# Patient Record
Sex: MALE | Race: WHITE | HISPANIC OR LATINO | Employment: FULL TIME | URBAN - METROPOLITAN AREA
[De-identification: names, ages, dates, MRNs, and addresses within clinical notes are randomized per-mention and may not be internally consistent; named-entity substitution may affect disease eponyms.]

---

## 2023-03-27 PROBLEM — J34.2 DEVIATED SEPTUM: Status: ACTIVE | Noted: 2023-03-27

## 2023-03-27 PROBLEM — S39.92XA BACK INJURY: Status: ACTIVE | Noted: 2023-03-27

## 2023-03-27 PROBLEM — D45 POLYCYTHEMIA VERA (MULTI): Status: ACTIVE | Noted: 2023-03-27

## 2023-03-27 PROBLEM — D17.72: Status: ACTIVE | Noted: 2023-03-27

## 2023-03-27 PROBLEM — G47.33 OSA (OBSTRUCTIVE SLEEP APNEA): Status: ACTIVE | Noted: 2023-03-27

## 2023-03-27 PROBLEM — M25.511 BILATERAL SHOULDER PAIN: Status: ACTIVE | Noted: 2023-03-27

## 2023-03-27 PROBLEM — M47.812 CERVICAL FACET SYNDROME: Status: ACTIVE | Noted: 2023-03-27

## 2023-03-27 PROBLEM — J03.90 TONSILLITIS: Status: ACTIVE | Noted: 2023-03-27

## 2023-03-27 PROBLEM — G47.19 EXCESSIVE DAYTIME SLEEPINESS: Status: ACTIVE | Noted: 2023-03-27

## 2023-03-27 PROBLEM — K82.4 GALL BLADDER POLYP: Status: ACTIVE | Noted: 2023-03-27

## 2023-03-27 PROBLEM — J30.89 ALLERGIC RHINITIS DUE TO MOLD: Status: ACTIVE | Noted: 2023-03-27

## 2023-03-27 PROBLEM — M71.9 BURSITIS: Status: ACTIVE | Noted: 2023-03-27

## 2023-03-27 PROBLEM — J30.89 ALLERGIC RHINITIS DUE TO DUST MITE: Status: ACTIVE | Noted: 2023-03-27

## 2023-03-27 PROBLEM — M54.9 BACK PAIN: Status: ACTIVE | Noted: 2023-03-27

## 2023-03-27 PROBLEM — R21 RASH: Status: ACTIVE | Noted: 2023-03-27

## 2023-03-27 PROBLEM — N50.89 SCROTAL MASS: Status: ACTIVE | Noted: 2023-03-27

## 2023-03-27 PROBLEM — N50.9 TESTICULAR LESION: Status: ACTIVE | Noted: 2023-03-27

## 2023-03-27 PROBLEM — M79.18 MYOFASCIAL PAIN SYNDROME: Status: ACTIVE | Noted: 2023-03-27

## 2023-03-27 PROBLEM — G71.19 NEUROMYOTONIA: Status: ACTIVE | Noted: 2023-03-27

## 2023-03-27 PROBLEM — K40.90 LEFT INGUINAL HERNIA: Status: ACTIVE | Noted: 2023-03-27

## 2023-03-27 PROBLEM — M79.603 ARM PAIN: Status: ACTIVE | Noted: 2023-03-27

## 2023-03-27 PROBLEM — J31.0 RHINITIS: Status: ACTIVE | Noted: 2023-03-27

## 2023-03-27 PROBLEM — R42 DIZZINESS: Status: ACTIVE | Noted: 2023-03-27

## 2023-03-27 PROBLEM — M25.522 LEFT ELBOW PAIN: Status: ACTIVE | Noted: 2023-03-27

## 2023-03-27 PROBLEM — R09.82 POST-NASAL DRAINAGE: Status: ACTIVE | Noted: 2023-03-27

## 2023-03-27 PROBLEM — F41.8 ANXIETY WITH DEPRESSION: Status: ACTIVE | Noted: 2023-03-27

## 2023-03-27 PROBLEM — J45.40 MODERATE PERSISTENT ASTHMA WITHOUT COMPLICATION (HHS-HCC): Status: ACTIVE | Noted: 2023-03-27

## 2023-03-27 PROBLEM — F41.9 ANXIETY DISORDER: Status: ACTIVE | Noted: 2023-03-27

## 2023-03-27 PROBLEM — F19.10 POLYSUBSTANCE ABUSE (MULTI): Status: ACTIVE | Noted: 2023-03-27

## 2023-03-27 PROBLEM — M25.512 BILATERAL SHOULDER PAIN: Status: ACTIVE | Noted: 2023-03-27

## 2023-03-27 PROBLEM — M54.2 NECK PAIN: Status: ACTIVE | Noted: 2023-03-27

## 2023-03-27 PROBLEM — R05.9 COUGH: Status: ACTIVE | Noted: 2023-03-27

## 2023-03-27 PROBLEM — J45.909 ASTHMA (HHS-HCC): Status: ACTIVE | Noted: 2023-03-27

## 2023-03-27 PROBLEM — K42.0 RECURRENT UMBILICAL HERNIA WITH INCARCERATION: Status: ACTIVE | Noted: 2023-03-27

## 2023-03-27 PROBLEM — M19.90 ARTHRITIS: Status: ACTIVE | Noted: 2023-03-27

## 2023-03-27 PROBLEM — M17.11 ARTHRITIS OF RIGHT KNEE: Status: ACTIVE | Noted: 2023-03-27

## 2023-03-27 PROBLEM — R25.3 BENIGN FASCICULATION-CRAMP SYNDROME: Status: ACTIVE | Noted: 2023-03-27

## 2023-03-27 PROBLEM — M79.671 PAIN OF RIGHT HEEL: Status: ACTIVE | Noted: 2023-03-27

## 2023-03-27 PROBLEM — M25.561 RIGHT KNEE PAIN: Status: ACTIVE | Noted: 2023-03-27

## 2023-03-27 PROBLEM — R07.81 RIB PAIN ON RIGHT SIDE: Status: ACTIVE | Noted: 2023-03-27

## 2023-03-27 PROBLEM — K58.9 IRRITABLE BOWEL SYNDROME WITHOUT DIARRHEA: Status: ACTIVE | Noted: 2023-03-27

## 2023-03-27 PROBLEM — B35.9 RINGWORM: Status: ACTIVE | Noted: 2023-03-27

## 2023-03-27 PROBLEM — E78.6 HYPOCHOLESTEROLEMIA: Status: ACTIVE | Noted: 2023-03-27

## 2023-03-27 PROBLEM — R19.4 CHANGE IN BOWEL HABIT: Status: ACTIVE | Noted: 2023-03-27

## 2023-03-27 PROBLEM — T78.40XA ALLERGY: Status: ACTIVE | Noted: 2023-03-27

## 2023-03-27 PROBLEM — R10.9 ABDOMINAL PAIN: Status: ACTIVE | Noted: 2023-03-27

## 2023-03-27 RX ORDER — ESCITALOPRAM OXALATE 20 MG/1
2 TABLET ORAL DAILY
COMMUNITY
Start: 2018-03-16 | End: 2023-12-19 | Stop reason: SDUPTHER

## 2023-03-27 RX ORDER — BENZOYL PEROXIDE 100 MG/ML
LIQUID TOPICAL 2 TIMES DAILY
COMMUNITY
Start: 2022-05-12 | End: 2023-05-01 | Stop reason: SDUPTHER

## 2023-03-27 RX ORDER — BUSPIRONE HYDROCHLORIDE 15 MG/1
1 TABLET ORAL 3 TIMES DAILY
COMMUNITY
Start: 2019-05-28 | End: 2023-12-19 | Stop reason: SDUPTHER

## 2023-03-27 RX ORDER — AZELASTINE HCL 205.5 UG/1
SPRAY NASAL
COMMUNITY
Start: 2022-02-23 | End: 2024-05-08 | Stop reason: WASHOUT

## 2023-03-27 RX ORDER — ALBUTEROL SULFATE 90 UG/1
2 AEROSOL, METERED RESPIRATORY (INHALATION) EVERY 4 HOURS PRN
COMMUNITY
Start: 2021-12-20 | End: 2024-05-08 | Stop reason: WASHOUT

## 2023-03-27 RX ORDER — MONTELUKAST SODIUM 10 MG/1
1 TABLET ORAL DAILY
COMMUNITY
Start: 2022-01-20 | End: 2024-05-08 | Stop reason: WASHOUT

## 2023-03-27 RX ORDER — ARIPIPRAZOLE 5 MG/1
1 TABLET ORAL DAILY
COMMUNITY
Start: 2021-11-30 | End: 2023-12-19 | Stop reason: SDUPTHER

## 2023-03-27 RX ORDER — ROSUVASTATIN CALCIUM 10 MG/1
1 TABLET, COATED ORAL NIGHTLY
COMMUNITY
Start: 2022-06-30

## 2023-03-27 RX ORDER — METRONIDAZOLE 10 MG/G
GEL TOPICAL
COMMUNITY
Start: 2022-05-12 | End: 2024-01-12 | Stop reason: SDUPTHER

## 2023-03-27 RX ORDER — CLONAZEPAM 0.5 MG/1
TABLET ORAL
COMMUNITY
Start: 2018-03-16 | End: 2024-05-08 | Stop reason: WASHOUT

## 2023-03-27 RX ORDER — AMMONIUM LACTATE 12 G/100G
LOTION TOPICAL
COMMUNITY
Start: 2021-06-24

## 2023-03-27 RX ORDER — LORATADINE 10 MG/1
1 TABLET ORAL DAILY
COMMUNITY
Start: 2021-08-13 | End: 2023-05-01 | Stop reason: SDUPTHER

## 2023-04-02 PROBLEM — M25.569 KNEE PAIN: Status: ACTIVE | Noted: 2023-04-02

## 2023-04-02 PROBLEM — F14.20 COCAINE USE DISORDER, MODERATE, DEPENDENCE (MULTI): Status: ACTIVE | Noted: 2023-04-02

## 2023-04-02 PROBLEM — F10.21 ALCOHOL DEPENDENCE IN REMISSION (MULTI): Status: ACTIVE | Noted: 2023-04-02

## 2023-04-02 PROBLEM — F12.10 CANNABIS USE DISORDER, MILD, ABUSE: Status: ACTIVE | Noted: 2023-04-02

## 2023-04-02 PROBLEM — M17.31 POST-TRAUMATIC OSTEOARTHRITIS OF RIGHT KNEE: Status: ACTIVE | Noted: 2023-04-02

## 2023-04-02 PROBLEM — F15.20: Status: ACTIVE | Noted: 2023-04-02

## 2023-04-03 ENCOUNTER — OFFICE VISIT (OUTPATIENT)
Dept: PRIMARY CARE | Facility: CLINIC | Age: 48
End: 2023-04-03
Payer: COMMERCIAL

## 2023-04-03 VITALS
BODY MASS INDEX: 33.47 KG/M2 | SYSTOLIC BLOOD PRESSURE: 126 MMHG | DIASTOLIC BLOOD PRESSURE: 72 MMHG | HEIGHT: 69 IN | WEIGHT: 226 LBS

## 2023-04-03 DIAGNOSIS — M17.11 ARTHRITIS OF RIGHT KNEE: Primary | ICD-10-CM

## 2023-04-03 PROCEDURE — 99214 OFFICE O/P EST MOD 30 MIN: CPT | Performed by: INTERNAL MEDICINE

## 2023-04-03 NOTE — PROGRESS NOTES
OFFICE NOTE    NAME OF THE PATIENT: Cecil Ruiz    YOB: 1975    CHIEF COMPLAINT:  Mr. Ruiz today came here for multiple medical issues.  Right knee severe pain.  He is going for presurgical clinic.  He wants presurgical clearance.  Though he has appointment with surgical clinic, they are going to do his blood work.  He showed me he had a stress test done six years ago with Dr. Jain.  It was okay.  He told me swims one-hour-a-day.  No problem.  Otherwise okay.    PAST MEDICAL HISTORY:  Reviewed on EMR, unchanged.  Anxiety, depression, arthritis.    CURRENT MEDICATIONS:  Reviewed on EMR, unchanged.  He is on Ability, citalopram, buspirone.    ALLERGIES:  Reviewed on EMR, unchanged.  Penicillin.    SOCIAL HISTORY:  Reviewed on EMR, unchanged.  He does not smoke, does not drink alcohol.  Occupation, he works for [employer].  He is single, one child.  Child healthy.    FAMILY HISTORY:  Reviewed on EMR, unchanged.  Mother passed away, had pancreatic cancer.  Father, atrial fibrillation.    IMMUNIZATION:  Tetanus within the last 10 years.    FUNCTIONAL CAPACITY:  He had a normal stress test six years ago and he can swim one hour.  He is going to presurgical clinic.  They are going to do EKG and blood work is pending.    REVIEW OF SYSTEMS:  The patient personally never had a heart attack.  No stroke.  No diabetes.  No cancer.  All 12 systems reviewed and pertaining covered in history and physical.    PHYSICAL EXAMINATION  VITAL SIGNS:  As recorded and reviewed from EMR.  EYES:  The patient's sclerae white.  The pupils were equal and round.  ENT:  The patient's external ears were normal and the otoscopic examination was negative.  NECK:  Supple.  There were no palpable masses.  Thyroid was not enlarged and there were no carotid bruits.  RESPIRATORY:  The patient had normal inspirations and expirations.  The breath sounds were equal bilaterally and clear to auscultation.  CARDIOVASCULAR:  The patient  "had S1 normal, split S2 without obvious rubs, clicks, or murmurs.  GASTROINTESTINAL:  There was no hepatosplenomegaly.  There were no palpable masses and no inguinal nodes.  LYMPHATIC:  The patient had no axillary, groin, or lymphadenopathy.  MUSCULOSKELETAL:  The patient had normal gait.  The joints appeared to be normal without evidence of deformity.  Grossly the muscles had good range of motion and were without effusion.  EXTREMITIES:  Knee swelling and tenderness.  Movements painful.  NEUROLOGIC:  The patient had normal cranial nerves.  The reflexes, sensory, and motor examination were grossly within normal limits.  PSYCHIATRIC:  The patient had normal judgment and insight.  The patient was oriented to person, place, and time, and had no obvious mood defect including depression, anxiety, and/or agitation.    LAB WORK:  Laboratory testing discussed.    ASSESSMENT AND PLAN:  The patient is going for surgery.  My recommendations:  Check the blood work and EKG.  It should be okay.  Cardiac wise looks like the patient is at average risk.  Anxiety and depression.  Continue medication.  DVT fall precaution routine, incentive spirometry, wound care.  All in all, the patient is at low risk for this knee surgery, but please make sure EKG and blood work okay.  Continue to follow.    Kindly review this note in conjunction with EMR.   Subjective   Patient ID: Cecil Ruiz is a 48 y.o. male who presents for Follow-up (Presurgical clearance ).      HPI    Review of Systems    Objective   /72   Ht 1.753 m (5' 9\")   Wt 103 kg (226 lb)   BMI 33.37 kg/m²       Physical Exam    Assessment/Plan   Problem List Items Addressed This Visit    None        "

## 2023-04-10 LAB
ALANINE AMINOTRANSFERASE (SGPT) (U/L) IN SER/PLAS: 26 U/L (ref 10–52)
ALBUMIN (G/DL) IN SER/PLAS: 4.3 G/DL (ref 3.4–5)
ALKALINE PHOSPHATASE (U/L) IN SER/PLAS: 50 U/L (ref 33–120)
AMPHETAMINE (PRESENCE) IN URINE BY SCREEN METHOD: NORMAL
ANION GAP IN SER/PLAS: 14 MMOL/L (ref 10–20)
ASPARTATE AMINOTRANSFERASE (SGOT) (U/L) IN SER/PLAS: 16 U/L (ref 9–39)
BARBITURATES PRESENCE IN URINE BY SCREEN METHOD: NORMAL
BASOPHILS (10*3/UL) IN BLOOD BY AUTOMATED COUNT: 0.06 X10E9/L (ref 0–0.1)
BASOPHILS/100 LEUKOCYTES IN BLOOD BY AUTOMATED COUNT: 0.8 % (ref 0–2)
BENZODIAZEPINE (PRESENCE) IN URINE BY SCREEN METHOD: NORMAL
BILIRUBIN TOTAL (MG/DL) IN SER/PLAS: 0.5 MG/DL (ref 0–1.2)
C REACTIVE PROTEIN (MG/L) IN SER/PLAS: 0.1 MG/DL
CALCIUM (MG/DL) IN SER/PLAS: 9.5 MG/DL (ref 8.6–10.3)
CANNABINOIDS IN URINE BY SCREEN METHOD: NORMAL
CARBON DIOXIDE, TOTAL (MMOL/L) IN SER/PLAS: 28 MMOL/L (ref 21–32)
CHLORIDE (MMOL/L) IN SER/PLAS: 104 MMOL/L (ref 98–107)
COCAINE (PRESENCE) IN URINE BY SCREEN METHOD: NORMAL
CREATININE (MG/DL) IN SER/PLAS: 0.96 MG/DL (ref 0.5–1.3)
DRUG SCREEN COMMENT URINE: NORMAL
EOSINOPHILS (10*3/UL) IN BLOOD BY AUTOMATED COUNT: 0.18 X10E9/L (ref 0–0.7)
EOSINOPHILS/100 LEUKOCYTES IN BLOOD BY AUTOMATED COUNT: 2.5 % (ref 0–6)
ERYTHROCYTE DISTRIBUTION WIDTH (RATIO) BY AUTOMATED COUNT: 12.8 % (ref 11.5–14.5)
ERYTHROCYTE MEAN CORPUSCULAR HEMOGLOBIN CONCENTRATION (G/DL) BY AUTOMATED: 32.6 G/DL (ref 32–36)
ERYTHROCYTE MEAN CORPUSCULAR VOLUME (FL) BY AUTOMATED COUNT: 92 FL (ref 80–100)
ERYTHROCYTES (10*6/UL) IN BLOOD BY AUTOMATED COUNT: 5.51 X10E12/L (ref 4.5–5.9)
FENTANYL URINE: NORMAL
GFR MALE: >90 ML/MIN/1.73M2
GLUCOSE (MG/DL) IN SER/PLAS: 85 MG/DL (ref 74–99)
HEMATOCRIT (%) IN BLOOD BY AUTOMATED COUNT: 50.9 % (ref 41–52)
HEMOGLOBIN (G/DL) IN BLOOD: 16.6 G/DL (ref 13.5–17.5)
IMMATURE GRANULOCYTES/100 LEUKOCYTES IN BLOOD BY AUTOMATED COUNT: 0.3 % (ref 0–0.9)
INR IN PPP BY COAGULATION ASSAY: 1.1 (ref 0.9–1.1)
LEUKOCYTES (10*3/UL) IN BLOOD BY AUTOMATED COUNT: 7.1 X10E9/L (ref 4.4–11.3)
LYMPHOCYTES (10*3/UL) IN BLOOD BY AUTOMATED COUNT: 2.14 X10E9/L (ref 1.2–4.8)
LYMPHOCYTES/100 LEUKOCYTES IN BLOOD BY AUTOMATED COUNT: 30.1 % (ref 13–44)
METHADONE (PRESENCE) IN URINE BY SCREEN METHOD: NORMAL
MONOCYTES (10*3/UL) IN BLOOD BY AUTOMATED COUNT: 0.59 X10E9/L (ref 0.1–1)
MONOCYTES/100 LEUKOCYTES IN BLOOD BY AUTOMATED COUNT: 8.3 % (ref 2–10)
NEUTROPHILS (10*3/UL) IN BLOOD BY AUTOMATED COUNT: 4.11 X10E9/L (ref 1.2–7.7)
NEUTROPHILS/100 LEUKOCYTES IN BLOOD BY AUTOMATED COUNT: 58 % (ref 40–80)
OPIATES (PRESENCE) IN URINE BY SCREEN METHOD: NORMAL
OXYCODONE (PRESENCE) IN URINE BY SCREEN METHOD: NORMAL
PHENCYCLIDINE (PRESENCE) IN URINE BY SCREEN METHOD: NORMAL
PLATELETS (10*3/UL) IN BLOOD AUTOMATED COUNT: 304 X10E9/L (ref 150–450)
POTASSIUM (MMOL/L) IN SER/PLAS: 4.5 MMOL/L (ref 3.5–5.3)
PROTEIN TOTAL: 6.9 G/DL (ref 6.4–8.2)
PROTHROMBIN TIME (PT) IN PPP BY COAGULATION ASSAY: 12.3 SEC (ref 9.8–13.4)
SEDIMENTATION RATE, ERYTHROCYTE: 9 MM/H (ref 0–15)
SODIUM (MMOL/L) IN SER/PLAS: 141 MMOL/L (ref 136–145)
UREA NITROGEN (MG/DL) IN SER/PLAS: 15 MG/DL (ref 6–23)

## 2023-05-01 DIAGNOSIS — R21 RASH: ICD-10-CM

## 2023-05-01 RX ORDER — BENZOYL PEROXIDE 100 MG/ML
LIQUID TOPICAL DAILY
Qty: 148 G | Refills: 3 | Status: SHIPPED | OUTPATIENT
Start: 2023-05-01 | End: 2023-12-21 | Stop reason: WASHOUT

## 2023-05-01 RX ORDER — LORATADINE 10 MG/1
10 TABLET ORAL DAILY
Qty: 90 TABLET | Refills: 0 | Status: SHIPPED | OUTPATIENT
Start: 2023-05-01 | End: 2024-05-08 | Stop reason: WASHOUT

## 2023-06-19 ENCOUNTER — OFFICE VISIT (OUTPATIENT)
Dept: PRIMARY CARE | Facility: CLINIC | Age: 48
End: 2023-06-19
Payer: COMMERCIAL

## 2023-06-19 VITALS
SYSTOLIC BLOOD PRESSURE: 126 MMHG | BODY MASS INDEX: 33.77 KG/M2 | DIASTOLIC BLOOD PRESSURE: 70 MMHG | HEIGHT: 69 IN | WEIGHT: 228 LBS

## 2023-06-19 DIAGNOSIS — I10 HYPERTENSION, UNSPECIFIED TYPE: Primary | ICD-10-CM

## 2023-06-19 PROCEDURE — 3074F SYST BP LT 130 MM HG: CPT | Performed by: INTERNAL MEDICINE

## 2023-06-19 PROCEDURE — 99213 OFFICE O/P EST LOW 20 MIN: CPT | Performed by: INTERNAL MEDICINE

## 2023-06-19 PROCEDURE — 3078F DIAST BP <80 MM HG: CPT | Performed by: INTERNAL MEDICINE

## 2023-06-19 NOTE — LETTER
June 19, 2023     Patient: Cecil Ruiz   YOB: 1975   Date of Visit: 6/19/2023       To Whom It May Concern:    Cecil Ruiz was seen in my clinic on 6/19/2023 at 9:45 am. Please excuse Cecil for his absence from work on this day to make the appointment. Please allow for 90 days starting July 6 2023 through October 6 2023 to please use the elevator due to a knee issue. Please call with any questions.     If you have any questions or concerns, please don't hesitate to call.         Sincerely,         Aldo Forrester MD        CC: No Recipients

## 2023-06-22 NOTE — PROGRESS NOTES
OFFICE NOTE    NAME OF THE PATIENT: Cecil Ruiz    YOB: 1975    CHIEF COMPLAINT:  This gentleman today came here for excruciating knee pain.  His surgery got postponed.  He is going to see Dr. Guardado.  He came for follow-up on various conditions.  He is seeing Pain Management for the knee.  He came for follow-up on various conditions.    PAST MEDICAL HISTORY:  Reviewed on EMR, unchanged.    CURRENT MEDICATIONS:  Reviewed on EMR, unchanged.  List reviewed.    ALLERGIES:  Reviewed on EMR, unchanged.    SOCIAL HISTORY:  Reviewed on EMR, unchanged.  He does not smoke.  No alcohol.  He uses cocaine.    FAMILY HISTORY:  Reviewed on EMR, unchanged.    REVIEW OF SYSTEMS:  All 12 systems reviewed and pertaining covered in history and physical.    PHYSICAL EXAMINATION  VITAL SIGNS:  As recorded and reviewed from EMR.  RESPIRATORY:  The patient had normal inspirations and expirations.  The breath sounds were equal bilaterally and clear to auscultation.  CARDIOVASCULAR:  The patient had S1 normal, split S2 without obvious rubs, clicks, or murmurs.    GASTROINTESTINAL:  There was no hepatosplenomegaly.  There were no palpable masses and no inguinal nodes.  EXTREMITIES:  Legs had no edema.  Knee swelling and tenderness.  NEUROLOGIC:  The patient had normal cranial nerves.  The reflexes, sensory, and motor examination were grossly within normal limits.    LAB WORK:  Laboratory testing discussed.    ASSESSMENT AND PLAN:  Knee pain, arthritis.  Going for physical therapy and Pain Management.  He might need surgery.  He still has cocaine abuse.  Work accommodation.  He wants to use the elevator.  I think he can use.  He might need surgery.   Probably several weeks after surgery he needs accommodation.  I gave him 90-day accommodation.    Kindly review this note in conjunction with EMR.   Subjective   Patient ID: Cecil Ruiz is a 48 y.o. male who presents for Follow-up.      HPI    Review of  "Systems    Objective   /70   Ht 1.753 m (5' 9\")   Wt 103 kg (228 lb)   BMI 33.67 kg/m²       Physical Exam    Assessment/Plan   Problem List Items Addressed This Visit    None        "

## 2023-07-03 ENCOUNTER — OFFICE VISIT (OUTPATIENT)
Dept: PRIMARY CARE | Facility: CLINIC | Age: 48
End: 2023-07-03
Payer: COMMERCIAL

## 2023-07-03 VITALS
HEIGHT: 70 IN | DIASTOLIC BLOOD PRESSURE: 74 MMHG | SYSTOLIC BLOOD PRESSURE: 132 MMHG | WEIGHT: 229 LBS | BODY MASS INDEX: 32.78 KG/M2

## 2023-07-03 DIAGNOSIS — M17.31 POST-TRAUMATIC OSTEOARTHRITIS OF RIGHT KNEE: ICD-10-CM

## 2023-07-03 PROCEDURE — 99213 OFFICE O/P EST LOW 20 MIN: CPT | Performed by: INTERNAL MEDICINE

## 2023-07-03 NOTE — PROGRESS NOTES
OFFICE NOTE    NAME OF THE PATIENT: Cecil Ruiz    YOB: 1975    CHIEF COMPLAINT:  This gentleman today came here for multiple medical issues.  Right knee pain is better.  He could not see Dr. Floyd because of it is a cocaine abuse problem.  He is going to see Dr. Guardado for the knee pain.  Post-traumatic anxiety and depression.  He sees psychiatrist.  She is dealing with his psychiatric issue.  He came here for follow-up.    PAST MEDICAL HISTORY:  Reviewed on EMR, unchanged.    CURRENT MEDICATIONS:  Reviewed on EMR, unchanged.  Abilify, Lexapro.    ALLERGIES:  Reviewed on EMR, unchanged.    SOCIAL HISTORY:  Reviewed on EMR, unchanged.  He does not smoke and does not drink alcohol.    FAMILY HISTORY:  Reviewed on EMR, unchanged.    REVIEW OF SYSTEMS:  All 12 systems reviewed and pertaining covered in history and physical.    PHYSICAL EXAMINATION  VITAL SIGNS:  As recorded and reviewed from EMR.  RESPIRATORY:  The patient had normal inspirations and expirations.  The breath sounds were equal bilaterally and clear to auscultation.  CARDIOVASCULAR:  The patient had S1 normal, split S2 without obvious rubs, clicks, or murmurs.    GASTROINTESTINAL:  There was no hepatosplenomegaly.  There were no palpable masses and no inguinal nodes.  EXTREMITIES:  Legs had no edema.  Knee exam, movements okay.  He could bend both the knees and get up on his own.  Right knee minimal swelling and pain.  NEUROLOGIC:  The patient had normal cranial nerves.  The reflexes, sensory, and motor examination were grossly within normal limits.    LAB WORK:  X-ray done in the past reviewed.    ASSESSMENT AND PLAN:  Right knee pain, chronic problem.  He had a surgery there.  He is going to see Dr. Guardado.  Anxiety, depression, cocaine abuse.  He sees psychiatrist.  I have reviewed psychiatric note from June.  Question of back to work.  I do not have his work description, but he should be able to work normally without  "overdoing his knee, but final state of work will come after evaluation, after seeing job description as well as from Orthopedic opinion.  Back to work as tolerated.  He can use elevator at work.      Kindly review this note in conjunction with EMR.     Subjective   Patient ID: Cecil Ruiz is a 48 y.o. male who presents for Follow-up.      HPI    Review of Systems    Objective   /74   Ht 1.765 m (5' 9.5\")   Wt 104 kg (229 lb)   BMI 33.33 kg/m²       Physical Exam    Assessment/Plan   Problem List Items Addressed This Visit    None        "

## 2023-07-03 NOTE — LETTER
July 3, 2023     Patient: Cecil Ruiz   YOB: 1975   Date of Visit: 7/3/2023       To Whom It May Concern:    Cecil Ruiz was seen in my clinic on 7/3/2023 at 4:15 pm. Please allow Cecil to return to work as tolerated.  Right knee post arthritis is under orthopaedics care. Please see Psychiatrics documentation.      I do not have Cecil's job description, as his job description he can return as tolerated, please allow Cecil to use elevator.     If you have any questions or concerns, please don't hesitate to call.         Sincerely,         Aldo Forrester MD

## 2023-07-03 NOTE — LETTER
July 6, 2023     Patient: Cecil Ruiz   YOB: 1975   Date of Visit: 7/3/2023       To Whom It May Concern:     Cecil Ruiz was seen in my clinic on 7/3/2023 at 4:15 pm. Please allow Cecil to return to work as tolerated.  Right knee post-traumatic osteoarthritis is under orthopaedics care. Please see Psychiatrics documentation. I do not have Cecil's job description, as his job description he can return as tolerated, please allow Cecil to use elevator.     If you have any questions or concerns, please don't hesitate to call [phone].    Sincerely,         Aldo Forrester MD

## 2023-08-15 ENCOUNTER — HOSPITAL ENCOUNTER (OUTPATIENT)
Dept: DATA CONVERSION | Facility: HOSPITAL | Age: 48
End: 2023-08-15
Attending: ANESTHESIOLOGY | Admitting: ANESTHESIOLOGY
Payer: COMMERCIAL

## 2023-08-15 DIAGNOSIS — M25.561 PAIN IN RIGHT KNEE: ICD-10-CM

## 2023-08-29 ENCOUNTER — HOSPITAL ENCOUNTER (OUTPATIENT)
Dept: DATA CONVERSION | Facility: HOSPITAL | Age: 48
End: 2023-08-29
Attending: ANESTHESIOLOGY
Payer: COMMERCIAL

## 2023-08-29 DIAGNOSIS — Z87.81 PERSONAL HISTORY OF (HEALED) TRAUMATIC FRACTURE: ICD-10-CM

## 2023-08-29 DIAGNOSIS — M17.11 UNILATERAL PRIMARY OSTEOARTHRITIS, RIGHT KNEE: ICD-10-CM

## 2023-08-29 DIAGNOSIS — M25.561 PAIN IN RIGHT KNEE: ICD-10-CM

## 2023-09-14 ENCOUNTER — TELEPHONE (OUTPATIENT)
Dept: PRIMARY CARE | Facility: CLINIC | Age: 48
End: 2023-09-14
Payer: COMMERCIAL

## 2023-09-15 ENCOUNTER — HOSPITAL ENCOUNTER (OUTPATIENT)
Dept: DATA CONVERSION | Facility: HOSPITAL | Age: 48
End: 2023-09-15
Attending: ANESTHESIOLOGY
Payer: COMMERCIAL

## 2023-09-15 DIAGNOSIS — M25.561 PAIN IN RIGHT KNEE: ICD-10-CM

## 2023-10-01 NOTE — OP NOTE
Post Operative Note:     PreOp Diagnosis: Right knee degeneration and pain   Post-Procedure Diagnosis: Right knee degeneration  and pain   Procedure: 1.  Radiofrequency ablation of the genicular  nerves on the right side  2.  Moderate Sedation   Surgeon: Edwin Slade MD, PhD   Resident/Fellow/Other Assistant: Praveen Fuentes MD   Estimated Blood Loss (mL): none   Specimen: no   Findings: See Operative Note     Operative Report Dictated:  Dictation: not applicable - note contains Operative  Report   Operative Report:    Mr. Ruiz is a 48-year-old gentleman with right knee degeneration status post traumatic injury with comminuted fracture of the right tibia at 16 years ago presents  for radiofrequency neurotomy of the genicular nerves having had successful diagnostic injections.  Following informed consent, the patient was brought to the operating room and placed in supine position with a roll underneath the right knee.  The right knee area was prepped and draped in the usual sterile fashion.  The skin and subcutaneous tissue  were anesthetized with a total of 8 cc of 0.5% lidocaine superficially.  Multiple needles were used to target the nerve to the vastus lateralis, medial and lateral branches of the nerves to the vastus intermedius, the nerve to the vastus medialis, the  superior lateral and inferior genicular nerves, as well as the recurrent fibular nerve, inferior lateral genicular nerve and infrapatellar branch of the saphenous nerve.  Stimulation was carried out using both sensory and motor stimulation.  2% lidocaine  was applied at each needle tip prior to radiofrequency neurotomy which was carried out at 85 degrees for 90 seconds x 2.  This was done and repeated cycles for needles at the time.  The patient tolerated the procedure fairly well with intravenous midazolam  and fentanyl for mild sedation.  He was transferred to the recovery Misys conclusion of the procedure if they taken out all the  needles.  He will update us on his response as an outpatient.    Attestation:   Note Completion:  Attending Attestation I was present for the entire procedure         Electronic Signatures:  Edwin Slade)  (Signed 15-Sep-2023 09:51)   Authored: Post Operative Note, Note Completion      Last Updated: 15-Sep-2023 09:51 by Edwin Slade)

## 2023-10-01 NOTE — OP NOTE
Post Operative Note:     PreOp Diagnosis: Postsurgical right knee degeneration  and pain   Post-Procedure Diagnosis: Postsurgical right knee  degeneration and pain   Procedure: 1.  Diagnostic right genicular nerve block  2.  Fluoroscopic guidance  3.  Moderate sedation   Surgeon: Edwin Slade MD, PhD   Resident/Fellow/Other Assistant: Yusuf Bob MD   Estimated Blood Loss (mL): none   Specimen: no   Findings: See Operative Note     Operative Report Dictated:  Dictation: not applicable - note contains Operative  Report   Operative Report:    Mr. Ruiz is a 48-year-old gentleman with history of traumatic fractures of the right knee status post surgical repair with subsequent chronic pain due to knee degeneration  presents today for his second diagnostic genicular block.  He has had a previous similar procedure 2 weeks ago with excellent relief.  If this procedure is helpful, he will be scheduled for radiofrequency neurotomy.    Following informed consent, the patient was brought to the operating room and placed in the supine position with a roll under his right knee.  The right knee area was prepped with chlorhexidine and sterile drapes were placed around the preoperative area.   Using fluoroscopic guidance 25-gauge spinal needles were advanced toward the superior medial, superior lateral and inferior medial genicular nerves.  Additionally, a needle was placed in close proximity to the infrapatellar branch of the saphenous nerve.   Injection small amount of contrast revealed appropriate spread.  Thereafter 0.5 cc of 2% lidocaine was injected into each needle tip.  The needle to the superior medial genicular nerves was then withdrawn to the mid//upper third segment of the femoral  shaft on the lateral view therefore targeting the left of the vastus lateralis and 0.5 cc of 2% lidocaine was injected there.  The needles were then advanced toward the dorsal aspect of the femoral bone of the midline superior to  the original needles  thereby targeting the nerves to the vastus lateralis and medial branch to the vastus intermedius.  The needle targeted the inferior lateral genicular nerve branch.  0.5 cc of 2% lidocaine was applied there.  The needles were then removed.  The patient  was transferred to recovery room where he reported greater than 50%..  As such she will be scheduled for radiofrequency neurotomy of the right genicular nerves.    Attestation:   Note Completion:  Attending Attestation I was present for the entire procedure         Electronic Signatures:  Edwin Slade)  (Signed 29-Aug-2023 08:12)   Entered: Post Operative Note   Authored: Post Operative Note, Note Completion      Last Updated: 29-Aug-2023 08:12 by Edwin Slade)

## 2023-10-01 NOTE — OP NOTE
Post Operative Note:     PreOp Diagnosis: Right knee degeneration and pain   Post-Procedure Diagnosis: Right knee degeneration  and pain   Procedure: 1.  Diagnostic right knee genicular nerve  block using fluoroscopic guidance   Surgeon: Edwin Slade MD, PhD   Resident/Fellow/Other Assistant: Yusuf Bob MD   Estimated Blood Loss (mL): none   Specimen: no   Findings: See Operative Note     Operative Report Dictated:  Dictation: not applicable - note contains Operative  Report   Operative Report:    Mr. Ruiz is a 48-year-old gentleman with history of right knee fracture status postsurgical repair with persistent knee pain presenting for diagnostic right knee  genicular nerve block.  X-ray revealed moderate degenerative joint disease of the right knee.    The patient was met in the preoperative holding area and risks, benefits, and alternatives of the procedure were discussed with the patient. The patient provided informed consent to move forward with the procedure.  An IV was placed, and the patient was  brought to the procedure room and positioned supine on the fluoroscopy table.  Regular monitors, including heart rate, blood pressure, and pulse oximetry were applied and monitored throughout the procedure.  The right knee area was exposed and prepped  and draped in the usual sterile fashion using ChloraPrep and sterile towels. Using fluoroscopic guidance, the expected anatomic positions of the right-sided superior medial, superior lateral, and inferomedial genicular nerves were identified as well as  inferior patellar branch of the saphenous and medial branch nerve to the vastus intermedius.  The needle to the superior medial genicular nerve also targeted the nerve the vastus medialis.  We also targeted the nerve to the vastus lateralis.  Each of  the nerves had a 25-gauge spinal needle inserted in close proximity under fluoroscopic guidance to the above-mentioned target sites.  Final needle tip  positions were confirmed in the AP and lateral views.  Injection of Omnipaque contrast after negative  aspiration showed no vascular uptake.  Next, again after negative aspiration, 0.5 mL of 0.5% ropivacaine was injected at each of the needle tips.  The needles were removed, and bandages were applied.      The patient tolerated the procedure well with no immediate complications and was brought to the recovery room in stable condition.  The patient has the pain typically with knee flexion and with squatting.  He will try these maneuvers at home and call  our office and update us on his response.    Attestation:   Note Completion:  Attending Attestation I was present for the entire procedure         Electronic Signatures:  Edwin Slade)  (Signed 15-Aug-2023 12:08)   Authored: Post Operative Note, Note Completion      Last Updated: 15-Aug-2023 12:08 by Edwin Slade)

## 2023-10-23 ENCOUNTER — OFFICE VISIT (OUTPATIENT)
Dept: PAIN MEDICINE | Facility: HOSPITAL | Age: 48
End: 2023-10-23
Payer: COMMERCIAL

## 2023-10-23 DIAGNOSIS — M25.561 CHRONIC PAIN OF RIGHT KNEE: Primary | ICD-10-CM

## 2023-10-23 DIAGNOSIS — G89.29 CHRONIC PAIN OF RIGHT KNEE: Primary | ICD-10-CM

## 2023-10-23 PROCEDURE — 3008F BODY MASS INDEX DOCD: CPT | Performed by: ANESTHESIOLOGY

## 2023-10-23 PROCEDURE — 99214 OFFICE O/P EST MOD 30 MIN: CPT | Performed by: ANESTHESIOLOGY

## 2023-10-23 RX ORDER — TOPIRAMATE 25 MG/1
25 TABLET ORAL CONTINUOUS
Qty: 115 TABLET | Refills: 0 | Status: SHIPPED | OUTPATIENT
Start: 2023-10-23 | End: 2023-11-22 | Stop reason: WASHOUT

## 2023-10-23 ASSESSMENT — PAIN SCALES - GENERAL: PAINLEVEL: 5

## 2023-10-23 NOTE — PROGRESS NOTES
Subjective   Patient ID: Cecil Ruiz is a 48 y.o. male follow-up regarding his chronic right knee pain.  The patient has had a traumatic knee injury many years ago and underwent surgical repair with multiple screws to fix his tibial fracture.  He has had chronic pain that has worsened more recently.  He has not been able to work since 2021.  Antibiotics were.  He states that he is due to return to work by November 1 unless he has disability paperwork filled out.  He underwent recently genicular nerve radiofrequency neurotomy on September 15 which unfortunately did not relieve his pain.  According to the patient he has had constant pain since then in the right knee region.  The patient denies any numbness or tingling in the lower extremity or weakness.    Review of Systems   Constitutional:  Negative for unexpected weight change.   HENT:  Negative for sinus pain.    Eyes:  Negative for visual disturbance.   Respiratory:  Negative for shortness of breath.    Cardiovascular:  Negative for chest pain.   Endocrine: Negative for polyphagia.   Genitourinary:  Negative for genital sores.   Hematological:  Negative for adenopathy.   Psychiatric/Behavioral:  Negative for hallucinations and suicidal ideas.       Physical Exam  Constitutional:       Appearance: Normal appearance.   HENT:      Head: Normocephalic and atraumatic.   Eyes:      Extraocular Movements: Extraocular movements intact.   Cardiovascular:      Rate and Rhythm: Normal rate.   Pulmonary:      Effort: Pulmonary effort is normal.   Abdominal:      General: Abdomen is flat.      Palpations: Abdomen is soft.   Musculoskeletal:         General: Tenderness present over the right knee to deep palpation.  Mild decrease sensation on the medial aspect of both knees.  No allodynia.  No swelling.  No discoloration  Neurological:      Mental Status: The patient is alert.   Psychiatric:         Mood and Affect: Mood normal.      Assessment/Plan   Diagnoses and all  orders for this visit:  Chronic pain of right knee  -     Referral to Occupational Therapy; Future  -     topiramate (Topamax) 25 mg tablet; Take 1 tablet (25 mg) by mouth continuously. Take 1 tab qHSx 3 days --> 1 BID x3 days--> 1 qAM 2 qHS x 3 days--> 2 BID x 3 days-->2 qAM, 3 qHS x3 days--> 3 BIDx 3 days --> 3 qAM, 4 at bedtime x3days -->4 BID and call the office for the 100 mg strength    Mr. Ruiz is a 48-year-old gentleman with chronic right knee pain presenting for persistent pain status post recent genicular radiofrequency neurotomy.  The patient states that he has no pain that is constant.  He may have had neuritic pain following the radiofrequency ablation.  The patient may benefit from being initiated on topiramate 25 mg at night and titrated up by 25 mg every third day until reaching 100 mg twice daily.  The patient will be maintained on that dosage if tolerated well. Goals of therapy, the dosages and side effects of the medication were discussed in detail with the patient.  The patient understands and agrees to take the medication as prescribed.      For his work status, the patient will be referred for functional evaluation.    The patient was invited to contact us back with any questions or concerns.

## 2023-11-01 ENCOUNTER — OFFICE VISIT (OUTPATIENT)
Dept: PAIN MEDICINE | Facility: HOSPITAL | Age: 48
End: 2023-11-01
Payer: COMMERCIAL

## 2023-11-01 DIAGNOSIS — M17.11 ARTHRITIS OF RIGHT KNEE: Primary | ICD-10-CM

## 2023-11-01 PROCEDURE — 3008F BODY MASS INDEX DOCD: CPT | Performed by: ANESTHESIOLOGY

## 2023-11-01 PROCEDURE — 99212 OFFICE O/P EST SF 10 MIN: CPT | Performed by: ANESTHESIOLOGY

## 2023-11-01 ASSESSMENT — PAIN SCALES - GENERAL: PAINLEVEL: 3

## 2023-11-01 NOTE — PROGRESS NOTES
Subjective   Patient ID: Cecil Ruiz is a 48 y.o. male.    Knee Pain       Cecil Ruiz is a 48-year-old gentleman with chronic right knee pain status post genicular radiofrequency neurotomy who is presenting as follow-up in our clinic today. Patient presents requesting FMLA documentation as well as a prescription for a cane that he requires for ambulation. He says his right knee is being well managed with his Topamax medication regiment and has no complaints at this time.    Review of Systems   All other systems reviewed and are negative.      Objective   Physical Exam  PHYSICAL EXAM  Vitals signs reviewed  Constitutional:       General: Not in acute distress     Appearance: Normal appearance. Not ill-appearing.  HENT:     Head: Normocephalic and atraumatic  Eyes:     Conjunctiva/sclera: Conjunctivae normal  Cardiovascular:     Rate and Rhythm: Normal rate and regular rhythm  Pulmonary:     Effort: No respiratory distress  Abdominal:     Palpations: Abdomen is soft  Musculoskeletal: GASTELUM  Skin:     General: Skin is warm and dry  Neurological:     General: No focal deficit present  Psychiatric:         Mood and Affect: Mood normal         Behavior: Behavior normal        Assessment/Plan   Diagnoses and all orders for this visit:  Arthritis of right knee  Cecil Ruiz is a 48-year-old gentleman with chronic right knee pain status post genicular radiofrequency neurotomy who is presenting as follow-up in our clinic today. Patient presents requesting FMLA documentation as well as a prescription for a cane that he requires for ambulation. He says his right knee is being well managed with his Topamax medication regiment and has no complaints at this time. Documentation will be filled out during this visit and the patient will follow-up as needed.

## 2023-11-14 ENCOUNTER — EVALUATION (OUTPATIENT)
Dept: OCCUPATIONAL THERAPY | Facility: HOSPITAL | Age: 48
End: 2023-11-14
Payer: COMMERCIAL

## 2023-11-14 DIAGNOSIS — G89.29 CHRONIC PAIN OF RIGHT KNEE: ICD-10-CM

## 2023-11-14 DIAGNOSIS — M54.12 RADICULOPATHY OF CERVICAL SPINE: Primary | ICD-10-CM

## 2023-11-14 DIAGNOSIS — M25.561 CHRONIC PAIN OF RIGHT KNEE: ICD-10-CM

## 2023-11-14 PROCEDURE — 97750 PHYSICAL PERFORMANCE TEST: CPT | Mod: GO | Performed by: OCCUPATIONAL THERAPIST

## 2023-11-14 ASSESSMENT — PAIN - FUNCTIONAL ASSESSMENT: PAIN_FUNCTIONAL_ASSESSMENT: 0-10

## 2023-11-14 ASSESSMENT — PAIN SCALES - GENERAL: PAINLEVEL_OUTOF10: 4

## 2023-11-14 NOTE — PROGRESS NOTES
Occupational Therapy    Occupational Therapy Evaluation    Name: Cecil Ruiz  MRN: [id number]  : 1975  Date: 23  Time Calculation  Start Time: 0805  Stop Time: 1050  Time Calculation (min): 165 min      Subjective   Current Problem:  1. Radiculopathy of cervical spine        2. Chronic pain of right knee  Referral to Occupational Therapy        General:   OT Received On: 23  General  Reason for Referral: Functional Capacity Evaluation completed this date.  General Comment: See details in [employer] report.  Precautions:  Precautions  Precautions Comment: None    Pain Assessment:  Pain Assessment  Pain Assessment: 0-10  Pain Score: 4    Objective   Prior Function Per Pt/Caregiver Report:   Pt reporting IND with ADL tasks. Pt stating he works at [employer], however has not worked since 23 secondary to pain.      OP EDUCATION:  Education  Individual(s) Educated: Patient  Risk and Benefits Discussed with Patient/Caregiver/Other: yes  Patient/Caregiver Demonstrated Understanding: yes  Plan of Care Discussed and Agreed Upon: yes  Assessment:   Pt tolerated OT functional capacity evaluation this date. Pt reporting 4/10 pain after OT treatment. Pt ambulated out of gym with SPC.   Plan:  Outpatient Plan  OT Plan: IE/DC FCE Eval Only  Rehab Potential: Good  Plan of Care Agreement: Patient  Goals:  IE/DC no goals established.

## 2023-11-14 NOTE — LETTER
November 14, 2023     Patient: Cecil Ruiz   YOB: 1975   Date of Visit: 11/14/2023       To Whom it May Concern:    Cecil Ruiz was seen in my clinic on 11/14/2023. He {Return to school/sport:43623}.    If you have any questions or concerns, please don't hesitate to call.         Sincerely,          Alice Friedman, OT        CC: No Recipients

## 2023-11-14 NOTE — LETTER
November 14, 2023     Patient: Cecil Ruiz   YOB: 1975   Date of Visit: 11/14/2023       To Whom It May Concern:    It is my medical opinion that Cecil Ruiz {Work release (duty restriction):45531}.    If you have any questions or concerns, please don't hesitate to call.         Sincerely,        Alice Friedman, OT    CC: No Recipients

## 2023-11-22 DIAGNOSIS — M17.31 POST-TRAUMATIC OSTEOARTHRITIS OF RIGHT KNEE: Primary | ICD-10-CM

## 2023-11-22 RX ORDER — TOPIRAMATE 100 MG/1
100 TABLET, FILM COATED ORAL 2 TIMES DAILY
Qty: 60 TABLET | Refills: 11 | Status: SHIPPED | OUTPATIENT
Start: 2023-11-22 | End: 2024-05-08 | Stop reason: ALTCHOICE

## 2023-12-04 ENCOUNTER — OFFICE VISIT (OUTPATIENT)
Dept: PAIN MEDICINE | Facility: HOSPITAL | Age: 48
End: 2023-12-04
Payer: COMMERCIAL

## 2023-12-04 DIAGNOSIS — M19.90 ARTHRITIS: Primary | ICD-10-CM

## 2023-12-04 PROCEDURE — 99213 OFFICE O/P EST LOW 20 MIN: CPT | Performed by: ANESTHESIOLOGY

## 2023-12-04 PROCEDURE — 3008F BODY MASS INDEX DOCD: CPT | Performed by: ANESTHESIOLOGY

## 2023-12-04 ASSESSMENT — ENCOUNTER SYMPTOMS
CARDIOVASCULAR NEGATIVE: 1
NUMBNESS: 0
RESPIRATORY NEGATIVE: 1
GASTROINTESTINAL NEGATIVE: 1
ARTHRALGIAS: 1
CONSTITUTIONAL NEGATIVE: 1
WEAKNESS: 0

## 2023-12-04 ASSESSMENT — PAIN SCALES - GENERAL: PAINLEVEL: 7

## 2023-12-04 NOTE — PROGRESS NOTES
Subjective   Cecil Ruiz is a 48 y.o. male who presents for routine follow up for his knee pain.  He is here today for his [employer] job and social security paperwork he wants filled out after completing his functional capacity evaluation in November.     History of Right knee arthritis and chronic knee pain after a traumatic injury many years ago and surgical repair. He has not been able to work since 2021.  He is s/p genicular RFA in September 2023 which did not help his pain, however he is now taking topiramate for his knee which he says helps his pain.  Today he rates the pain 3/10 and is happy with this level of pain control.     He denies any side effects from his topiramate and has no questions or issues to address today other than the job paperwork and social security paperwork.     Past Medical History:   Diagnosis Date    Personal history of other diseases of the digestive system     History of diverticulitis of colon     Past Surgical History:   Procedure Laterality Date    KNEE SURGERY  04/21/2017    Knee Surgery    OTHER SURGICAL HISTORY  10/20/2021    Inguinal hernia repair    OTHER SURGICAL HISTORY  10/20/2021    Umbilical hernia repair       I have reviewed the nurses notes and am aware of family/social history.     Review of Systems   Constitutional: Negative.    Respiratory: Negative.     Cardiovascular: Negative.    Gastrointestinal: Negative.    Genitourinary: Negative.    Musculoskeletal:  Positive for arthralgias.   Neurological:  Negative for weakness and numbness.       Objective   Physical Exam  Constitutional:       Appearance: Normal appearance.   HENT:      Head: Normocephalic and atraumatic.      Mouth/Throat:      Mouth: Mucous membranes are moist.   Eyes:      Conjunctiva/sclera: Conjunctivae normal.   Pulmonary:      Effort: Pulmonary effort is normal.   Musculoskeletal:         General: Normal range of motion.      Right knee: Bony tenderness present. Tenderness present.      Left  knee: Normal.   Skin:     General: Skin is warm and dry.   Neurological:      General: No focal deficit present.      Mental Status: He is alert and oriented to person, place, and time.      Motor: Motor function is intact.      Gait: Gait is intact.   Psychiatric:         Mood and Affect: Mood normal.         Behavior: Behavior normal.         ASSESSMENT/PLAN:   OA right knee, chronic R knee pain   - continue home topiramate as directed   - discussed with patient we will fill out his [employer] job work forms stating he is unable to work the physical demands of that job like lifting objects greater than 50 lbs   - discussed with patient that we do not handle disability / social security work forms or claims and we will refer him to occupational medicine to assess if he would qualify for partial or full disability with Social Security      Discussed treatment plan with patient and attending Dr. Slade   Call or return to clinic prn if these symptoms worsen or fail to improve as anticipated.     Rani De La Cruz, DO

## 2023-12-19 ENCOUNTER — TELEMEDICINE (OUTPATIENT)
Dept: BEHAVIORAL HEALTH | Facility: CLINIC | Age: 48
End: 2023-12-19
Payer: COMMERCIAL

## 2023-12-19 DIAGNOSIS — F43.10 PTSD (POST-TRAUMATIC STRESS DISORDER): ICD-10-CM

## 2023-12-19 DIAGNOSIS — F41.8 ANXIETY WITH DEPRESSION: ICD-10-CM

## 2023-12-19 PROCEDURE — 99214 OFFICE O/P EST MOD 30 MIN: CPT | Performed by: PSYCHIATRY & NEUROLOGY

## 2023-12-19 RX ORDER — ARIPIPRAZOLE 10 MG/1
10 TABLET ORAL DAILY
Qty: 90 TABLET | Refills: 1 | Status: SHIPPED | OUTPATIENT
Start: 2023-12-19 | End: 2024-03-26 | Stop reason: SDUPTHER

## 2023-12-19 RX ORDER — BUSPIRONE HYDROCHLORIDE 15 MG/1
15 TABLET ORAL 3 TIMES DAILY
Qty: 270 TABLET | Refills: 1 | Status: SHIPPED | OUTPATIENT
Start: 2023-12-19 | End: 2024-03-26 | Stop reason: SDUPTHER

## 2023-12-19 RX ORDER — ESCITALOPRAM OXALATE 20 MG/1
40 TABLET ORAL DAILY
Qty: 180 TABLET | Refills: 1 | Status: SHIPPED | OUTPATIENT
Start: 2023-12-19 | End: 2024-03-26

## 2023-12-19 RX ORDER — PRAZOSIN HYDROCHLORIDE 1 MG/1
1 CAPSULE ORAL NIGHTLY
Qty: 30 CAPSULE | Refills: 2 | Status: SHIPPED | OUTPATIENT
Start: 2023-12-19 | End: 2024-03-26 | Stop reason: SDUPTHER

## 2023-12-19 NOTE — PROGRESS NOTES
Subjective   Patient ID: Cecil Ruiz is a 48 y.o. male.    HPI  Pt is following with medical treatment team for pain, orthopedic illness;  Pt denies recurrence of substance use;  Pt reports nightmares associated with previous traumatic experience    Review of Systems   Psychiatric/Behavioral:  Positive for sleep disturbance. Negative for confusion, decreased concentration, hallucinations, self-injury and suicidal ideas. The patient is not hyperactive.        Objective   Physical Exam  Psychiatric:         Mood and Affect: Affect normal.         Speech: Speech normal.         Behavior: Behavior normal. Behavior is cooperative.         Thought Content: Thought content is not paranoid or delusional. Thought content does not include homicidal or suicidal plan.         Cognition and Memory: Cognition normal.         Judgment: Judgment normal.         Assessment/Plan   Diagnoses and all orders for this visit:  Anxiety with depression  -     ARIPiprazole (Abilify) 10 mg tablet; Take 1 tablet (10 mg) by mouth once daily.  -     busPIRone (Buspar) 15 mg tablet; Take 1 tablet (15 mg) by mouth 3 times a day.  -     escitalopram (Lexapro) 20 mg tablet; Take 2 tablets (40 mg) by mouth once daily.  PTSD (post-traumatic stress disorder)  -     prazosin (Minipress) 1 mg capsule; Take 1 capsule (1 mg) by mouth once daily at bedtime.    Trial of prazosin  Start individual therapy- try [facility]   ASSESSMENT AND PLAN  Keep next scheduled follow up visit;  ---after business hours and on weekends: call [phone] to reach the [facility] service  ---for appointments and medication refills and any questions or concerns call [phone]  ---if you run out of your medication or need more refills between visits, call our office: [phone]  ---if you need immediate assistance or in case of emergency, dial 911 or go to the nearest emergency room   ---discussed Risks, benefits, side effects and  alternative treatments today and came up with a treatment plan       that pt agreed upon today.

## 2023-12-21 ENCOUNTER — OFFICE VISIT (OUTPATIENT)
Dept: ORTHOPEDIC SURGERY | Facility: CLINIC | Age: 48
End: 2023-12-21
Payer: COMMERCIAL

## 2023-12-21 DIAGNOSIS — M17.31 POST-TRAUMATIC OSTEOARTHRITIS OF RIGHT KNEE: Primary | ICD-10-CM

## 2023-12-21 PROCEDURE — 3008F BODY MASS INDEX DOCD: CPT | Performed by: ORTHOPAEDIC SURGERY

## 2023-12-21 PROCEDURE — 99212 OFFICE O/P EST SF 10 MIN: CPT | Performed by: ORTHOPAEDIC SURGERY

## 2023-12-21 ASSESSMENT — PAIN SCALES - GENERAL: PAINLEVEL_OUTOF10: 3

## 2023-12-21 ASSESSMENT — PAIN - FUNCTIONAL ASSESSMENT: PAIN_FUNCTIONAL_ASSESSMENT: NO/DENIES PAIN

## 2023-12-22 NOTE — PROGRESS NOTES
Patient is a 48-year-old gentleman who was seen in the past.  Has a history of a tibial plateau fracture.  He has been seeing pain management for nerve blocks.  They recently filled out some paperwork form for [employer] that he says was apparently filled out incorrectly.  He also recently had a functional capacity exam and is filing for disability.  He is requesting me to fill out his paperwork today in clinic.  I instructed him that only perform this for surgical patients in my practice.  He can discuss with pain management correcting the areas on his paperwork or return to see whoever did his functional capacity exam.  All of his questions were answered.

## 2023-12-29 ENCOUNTER — OFFICE VISIT (OUTPATIENT)
Dept: PAIN MEDICINE | Facility: HOSPITAL | Age: 48
End: 2023-12-29
Payer: COMMERCIAL

## 2023-12-29 DIAGNOSIS — M17.31 POST-TRAUMATIC OSTEOARTHRITIS OF RIGHT KNEE: Primary | ICD-10-CM

## 2023-12-29 PROCEDURE — 3008F BODY MASS INDEX DOCD: CPT | Performed by: ANESTHESIOLOGY

## 2023-12-29 PROCEDURE — 99213 OFFICE O/P EST LOW 20 MIN: CPT | Performed by: ANESTHESIOLOGY

## 2023-12-29 ASSESSMENT — ENCOUNTER SYMPTOMS
WEAKNESS: 0
SLEEP DISTURBANCE: 1
FEVER: 0
FACIAL SWELLING: 0
BACK PAIN: 0
NUMBNESS: 0
DECREASED CONCENTRATION: 0
COUGH: 0
ABDOMINAL DISTENTION: 0
CONFUSION: 0
HALLUCINATIONS: 0
FACIAL ASYMMETRY: 0
ARTHRALGIAS: 1
NECK STIFFNESS: 1
ACTIVITY CHANGE: 1
NECK PAIN: 0
HYPERACTIVE: 0

## 2023-12-29 NOTE — PROGRESS NOTES
Subjective   Patient ID: Cecil Ruiz is a 48 y.o. male with hx of R tibial plateau fracture status post repair, posttraumatic right osteoarthritis of the knee and has received R knee RFA on 9/15/23.    Patient received right knee RFA on 9- which did not provide significant relief.  However patient was started on Topamax 100 mg twice daily which has significantly relieved his right knee pain.  He currently states that he has 3 out of 10 right knee pain, however he is limited and unable to continue his job at [employer] given heavy lifting consistently greater than 50 pounds.  He received functional capacity evaluation which showed medium level capacity and able to lift intermittently 20 to 49 pounds, which is inconsistent with his work at [employer].  He is here for disability paperwork for his job.    Knee Pain   Pertinent negatives include no numbness.       Review of Systems   Constitutional:  Positive for activity change. Negative for fever.   HENT:  Negative for facial swelling.    Respiratory:  Negative for cough.    Cardiovascular:  Negative for leg swelling.   Gastrointestinal:  Negative for abdominal distention.   Musculoskeletal:  Positive for arthralgias and neck stiffness. Negative for back pain and neck pain.   Skin:  Negative for pallor.   Neurological:  Negative for facial asymmetry, weakness and numbness.       Objective   Physical Exam  Constitutional:       General: He is not in acute distress.  HENT:      Head: Normocephalic and atraumatic.   Eyes:      Extraocular Movements: Extraocular movements intact.      Conjunctiva/sclera: Conjunctivae normal.   Pulmonary:      Effort: Pulmonary effort is normal.   Abdominal:      General: Abdomen is flat. There is no distension.   Musculoskeletal:         General: Normal range of motion.      Right lower leg: No edema.      Left lower leg: No edema.      Comments: Mild tenderness to palpation noted along medial joint line of knee.  Patient has full  active and passive range of motion of right knee.   Skin:     General: Skin is warm and dry.      Findings: No rash.   Neurological:      General: No focal deficit present.      Mental Status: He is alert and oriented to person, place, and time.      Comments: Right knee strength 5 out of 5 with flexion, extension and right ankle plantarflexion/dorsiflexion.  Sensation intact in right lower extremity.   Psychiatric:         Mood and Affect: Mood normal.         Assessment/Plan   Patient is a 48-year-old male with past medical history of traumatic right knee osteoarthritis who continues to have mild to moderate right knee pain.  Functional capacity evaluation showed mild capacity with ability to lift 20 to 49 pounds intermittently.  He has also limited ability to stand according to the functional capacity evaluation.  However, this is inconsistent with his lifting at [employer] (lifting for pounds or greater consistently) as well as prolonged standing.  Paperwork was filled out for disability from [employer] given that patient's not been able to continue work.  He will also be following up with Dr. Naidu from physical medicine and rehabilitation.    Patient seen and discussed with Dr. Slade in clinic.    Dyllan Paredes, PGY-1

## 2024-01-12 ENCOUNTER — APPOINTMENT (OUTPATIENT)
Dept: PRIMARY CARE | Facility: CLINIC | Age: 49
End: 2024-01-12
Payer: COMMERCIAL

## 2024-01-12 ENCOUNTER — LAB (OUTPATIENT)
Dept: LAB | Facility: LAB | Age: 49
End: 2024-01-12
Payer: COMMERCIAL

## 2024-01-12 ENCOUNTER — OFFICE VISIT (OUTPATIENT)
Dept: PRIMARY CARE | Facility: CLINIC | Age: 49
End: 2024-01-12
Payer: COMMERCIAL

## 2024-01-12 VITALS
WEIGHT: 236 LBS | HEIGHT: 70 IN | SYSTOLIC BLOOD PRESSURE: 142 MMHG | BODY MASS INDEX: 33.79 KG/M2 | DIASTOLIC BLOOD PRESSURE: 86 MMHG

## 2024-01-12 DIAGNOSIS — I10 HYPERTENSION, UNSPECIFIED TYPE: ICD-10-CM

## 2024-01-12 DIAGNOSIS — M54.9 CHRONIC BACK PAIN, UNSPECIFIED BACK LOCATION, UNSPECIFIED BACK PAIN LATERALITY: ICD-10-CM

## 2024-01-12 DIAGNOSIS — M25.562 CHRONIC PAIN OF BOTH KNEES: ICD-10-CM

## 2024-01-12 DIAGNOSIS — Z12.5 ENCOUNTER FOR PROSTATE CANCER SCREENING: ICD-10-CM

## 2024-01-12 DIAGNOSIS — F41.9 ANXIETY AND DEPRESSION: ICD-10-CM

## 2024-01-12 DIAGNOSIS — G89.29 CHRONIC BACK PAIN, UNSPECIFIED BACK LOCATION, UNSPECIFIED BACK PAIN LATERALITY: ICD-10-CM

## 2024-01-12 DIAGNOSIS — L70.9 ACNE, UNSPECIFIED ACNE TYPE: ICD-10-CM

## 2024-01-12 DIAGNOSIS — F32.A ANXIETY AND DEPRESSION: ICD-10-CM

## 2024-01-12 DIAGNOSIS — L71.9 ROSACEA: Primary | ICD-10-CM

## 2024-01-12 DIAGNOSIS — M25.561 CHRONIC PAIN OF BOTH KNEES: ICD-10-CM

## 2024-01-12 DIAGNOSIS — G89.29 CHRONIC PAIN OF BOTH KNEES: ICD-10-CM

## 2024-01-12 DIAGNOSIS — E78.6 HYPOCHOLESTEROLEMIA: ICD-10-CM

## 2024-01-12 DIAGNOSIS — M54.2 NECK PAIN: ICD-10-CM

## 2024-01-12 LAB
ALBUMIN SERPL BCP-MCNC: 4.9 G/DL (ref 3.4–5)
ALP SERPL-CCNC: 53 U/L (ref 33–120)
ALT SERPL W P-5'-P-CCNC: 29 U/L (ref 10–52)
ANION GAP SERPL CALC-SCNC: 14 MMOL/L (ref 10–20)
AST SERPL W P-5'-P-CCNC: 22 U/L (ref 9–39)
BILIRUB SERPL-MCNC: 1.2 MG/DL (ref 0–1.2)
BUN SERPL-MCNC: 18 MG/DL (ref 6–23)
CALCIUM SERPL-MCNC: 10.5 MG/DL (ref 8.6–10.6)
CHLORIDE SERPL-SCNC: 105 MMOL/L (ref 98–107)
CHOLEST SERPL-MCNC: 173 MG/DL (ref 0–199)
CHOLESTEROL/HDL RATIO: 3.5
CO2 SERPL-SCNC: 26 MMOL/L (ref 21–32)
CREAT SERPL-MCNC: 1.09 MG/DL (ref 0.5–1.3)
EGFRCR SERPLBLD CKD-EPI 2021: 83 ML/MIN/1.73M*2
ERYTHROCYTE [DISTWIDTH] IN BLOOD BY AUTOMATED COUNT: 13.7 % (ref 11.5–14.5)
GLUCOSE SERPL-MCNC: 95 MG/DL (ref 74–99)
HCT VFR BLD AUTO: 50.9 % (ref 41–52)
HDLC SERPL-MCNC: 49.3 MG/DL
HGB BLD-MCNC: 17.1 G/DL (ref 13.5–17.5)
LDLC SERPL CALC-MCNC: 109 MG/DL
MCH RBC QN AUTO: 31 PG (ref 26–34)
MCHC RBC AUTO-ENTMCNC: 33.6 G/DL (ref 32–36)
MCV RBC AUTO: 92 FL (ref 80–100)
NON HDL CHOLESTEROL: 124 MG/DL (ref 0–149)
NRBC BLD-RTO: 0 /100 WBCS (ref 0–0)
PLATELET # BLD AUTO: 297 X10*3/UL (ref 150–450)
POTASSIUM SERPL-SCNC: 3.7 MMOL/L (ref 3.5–5.3)
PROT SERPL-MCNC: 7.4 G/DL (ref 6.4–8.2)
PSA SERPL-MCNC: 1.7 NG/ML
RBC # BLD AUTO: 5.52 X10*6/UL (ref 4.5–5.9)
SODIUM SERPL-SCNC: 141 MMOL/L (ref 136–145)
TRIGL SERPL-MCNC: 74 MG/DL (ref 0–149)
TSH SERPL-ACNC: 2.06 MIU/L (ref 0.44–3.98)
VLDL: 15 MG/DL (ref 0–40)
WBC # BLD AUTO: 7.9 X10*3/UL (ref 4.4–11.3)

## 2024-01-12 PROCEDURE — 99214 OFFICE O/P EST MOD 30 MIN: CPT | Performed by: INTERNAL MEDICINE

## 2024-01-12 PROCEDURE — 3077F SYST BP >= 140 MM HG: CPT | Performed by: INTERNAL MEDICINE

## 2024-01-12 PROCEDURE — 85027 COMPLETE CBC AUTOMATED: CPT

## 2024-01-12 PROCEDURE — 36415 COLL VENOUS BLD VENIPUNCTURE: CPT

## 2024-01-12 PROCEDURE — 3008F BODY MASS INDEX DOCD: CPT | Performed by: INTERNAL MEDICINE

## 2024-01-12 PROCEDURE — 3079F DIAST BP 80-89 MM HG: CPT | Performed by: INTERNAL MEDICINE

## 2024-01-12 PROCEDURE — 80053 COMPREHEN METABOLIC PANEL: CPT

## 2024-01-12 PROCEDURE — 84443 ASSAY THYROID STIM HORMONE: CPT

## 2024-01-12 PROCEDURE — 84153 ASSAY OF PSA TOTAL: CPT

## 2024-01-12 PROCEDURE — 80061 LIPID PANEL: CPT

## 2024-01-12 RX ORDER — METRONIDAZOLE 10 MG/G
GEL TOPICAL
Qty: 60 G | Refills: 3 | Status: SHIPPED | OUTPATIENT
Start: 2024-01-12 | End: 2024-05-08 | Stop reason: WASHOUT

## 2024-01-12 RX ORDER — DOXYCYCLINE 100 MG/1
100 CAPSULE ORAL 2 TIMES DAILY
Qty: 20 CAPSULE | Refills: 0 | Status: SHIPPED | OUTPATIENT
Start: 2024-01-12 | End: 2024-01-22

## 2024-01-12 ASSESSMENT — ENCOUNTER SYMPTOMS
OCCASIONAL FEELINGS OF UNSTEADINESS: 0
LOSS OF SENSATION IN FEET: 0
DEPRESSION: 0

## 2024-01-12 NOTE — PROGRESS NOTES
"Subjective   Patient ID: Cecil Ruiz is a 49 y.o. male who presents for Follow-up (multiple medical issues.).     Mr. Ruiz today came here for multiple medical issues.  1. He is not feeling good.  Weak, tired, fatigued, and exhausted.  Knees hurting.  He wants to go for work conditioning.  His back is hurting.  2. Follow-up on other conditions.  Facial rash, but he does not like benzoyl peroxide, it dries mouth.  He came here for follow-up on various conditions.  Appetite and weight are okay.  No problem.    I have personally reviewed the patient's Past Medical History, Medications, Allergies, Social History, and Family History in the EMR.    Review of Systems   All other systems reviewed and are negative.    Objective   /86   Ht 1.765 m (5' 9.5\")   Wt 107 kg (236 lb)   BMI 34.35 kg/m²     Physical Exam  Vitals reviewed.   Cardiovascular:      Heart sounds: Normal heart sounds, S1 normal and S2 normal. No murmur heard.     No friction rub.   Pulmonary:      Effort: Pulmonary effort is normal.      Breath sounds: Normal breath sounds and air entry.   Abdominal:      Palpations: There is no hepatomegaly, splenomegaly or mass.   Musculoskeletal:      Right lower leg: No edema.      Left lower leg: No edema.   Lymphadenopathy:      Lower Body: No right inguinal adenopathy. No left inguinal adenopathy.   Skin:     Comments: FACE:  Adult acne.   Neurological:      Cranial Nerves: Cranial nerves 2-12 are intact.      Sensory: No sensory deficit.      Motor: Motor function is intact.      Deep Tendon Reflexes: Reflexes are normal and symmetric.     LAB WORK:  Laboratory testing discussed.    Assessment/Plan   Problem List Items Addressed This Visit             ICD-10-CM       Cardiac and Vasculature    Hypocholesterolemia E78.6    Relevant Orders    Comprehensive Metabolic Panel (Completed)    Lipid Panel (Completed)       Musculoskeletal and Injuries    Back pain M54.9    Relevant Orders    Referral to " Occupational Therapy    Neck pain M54.2    Relevant Orders    Referral to Occupational Therapy    Knee pain M25.569    Relevant Orders    Referral to Occupational Therapy     Other Visit Diagnoses         Codes    Rosacea    -  Primary L71.9    Hypertension, unspecified type     I10    Relevant Orders    CBC (Completed)    Urinalysis with Reflex Microscopic    Thyroid Stimulating Hormone (Completed)    Prostate Specific Antigen, Screen (Completed)    Encounter for prostate cancer screening     Z12.5    Acne, unspecified acne type     L70.9    Relevant Medications    doxycycline (Vibramycin) 100 mg capsule    metroNIDAZOLE (Metrogel) 1 % gel    Anxiety and depression     F41.9, F32.A        1. Rosacea.  Wash with clean water.  MetroGel, doxycycline given.  2. High cholesterol.  He is not taking any medicines.  I am going to check his cholesterol.  3. Hypertension.  Monitor.  4. Anxiety and depression, on medication.  5. Chronic knee pain, back pain, work-related issue.  He is seeing a specialist.  He wants to go for conditioning.  6. I shall see him in a week after blood test.    Scribe Attestation  By signing my name below, I, Naseem Andrade attest that this documentation has been prepared under the direction and in the presence of Aldo Forrester MD.

## 2024-01-30 NOTE — PROGRESS NOTES
Chief complaint:     Dear Dr. Slade    I had the pleasure of seeing your patient, Cecil Ruiz in clinic today.  As you know, he is a pleasant 49-year-old right-handed man with past medical history of HTN, HLD, anxiety/depression, right tibial fracture, and chronic knee pain presenting with right knee pain.    TIMELINE OF COMPLAINT(S):  Right knee arthritis and chronic knee pain after a traumatic tibial fracture in 2007 and surgical repair. Patient states knee started hurting near hardware 2 years post surgery. Meniscus torn September 2021, Dr. Saavedra performed laparoscopic meniscus repair. He has not been able to work since 2021.  He is s/p genicular RFA in September 2023 which did not help. He says he is ready for a knee replacement but 3-4 orthopedic surgeons say he is not ready due to age and radiographs. He has history of cocaine use, sober since 7/2023. He states he was started on topiramate 3 months ago and developed a rash on his face 1 month ago.  Patient also reports low back pain that is long standing but would like to focus on his knee pain at today's visit.    -He has some occasional urge bowel incontinence, denies any urine ncontinence or saddle anesthesia.    Pain:  LOCATION- medial right knee pointing to the pes anserinus area  RADIATION- no  ASSOCIATED WITH- Buckling  NUMBNESS/TINGLING- No  WEAKNESS- No  CONSTANT or INTERMITTENT- Constant  SEVERITY/QUANTITY- 3  QUALITY- Achy, burning along scars  EXACERBATED BY- Stairs, inclines, squats  BETTER WITH- Ice  TRIED- Ibuprofen, Topiramate      Anti-Inflammatories: ketorolac (didn't help), prednisone, ibuprofen, meloxicam, never tried Voltaren gel      Muscle relaxants: clonazepam (for panic), cyclobenzaprine, (doesn't remember).      Anti-depressants: multiple over years, no SNRI or TCA, currently on Abilify, Lexapro, and Buspar      Neuroleptics: Topiramate helped significantly, gabapentin (300mg TID),       LDN:    PHYSICAL THERAPY: Yes, 1-2yrs ago.   "No home exercises  TENS unit: No  CHIROPRACTIC MANIPULATION: No  ACUPUNCTURE TREATMENTS: No  DEEP TISSUE MASSAGE THERAPY: No  OSTEOPATHIC MANIPULATION THERAPY: No  INJECTIONS: Previous cortisone and \"cortisone like injection\" did not help.  He does not think he had any hyaluronic acid injections, genicular nerve block 8/2023 (seemed to help), RFA 9/2023 (didn't help)  EMG/NCS: No    IMAGING: Yes      === 05/04/23 ===    XR KNEE COMPLETE 4 OR MORE VIEWS  Surgical plate and numerous surgical screws are again seen throughout  the proximal right tibia. Redemonstrated plateau fracture is  unchanged in appearance compared to prior imaging. No new displaced  fractures are evident. Normal bone mineralization. No sizable  suprapatellar joint effusion of the right knee.  - Impression -  Postsurgical changes of the proximal right tibia without significant  change compared to prior imaging.    XR R Knee 5/31/23  Postsurgical changes ORIF right proximal tibia with multiple intact   surgical screws.  There is mild osteoarthritis lateral compartment with   joint space narrowing and small marginal osteophytes.  Small   patellofemoral osteophytes are     FUNCTIONAL HISTORY: The patient is independent in all ADLs, mobility, and driving. The patient does use a cane for long distances only.    SH:  Lives in: [place]   Lives with: Daughter  Occupation: [employer]  Tobacco: Former, quit 2010. One pack daily  Alcohol: \"lots\"  Drugs: Former, quit cocaine in July 2023    ROS: The patient denies any bowel or bladder incontinence/accidents, night sweats, fevers, chills, recent significant weight loss. A 14 point review of systems was reviewed with the patient and is as above and otherwise negative.  ROS questionnaire positive for fatigue, intermittent urge bowel incontinence, joint pain, back pain, rash, depression, anxiety, sleep disturbances, limited range of motion    Physical Exam  Constitutional:           Comments: Right knee pain.    "     PHYSICAL EXAM    GEN - Alert, well-developed, well-nourished, no acute distress  PSYCH - Cooperative, appropriate mood and affect  HEENT - NC/AT, erythematous rash to bridge of nose, legs.  RESP - Non-labored respirations, equal expansion  CV - warm and well-perfused, No cyanosis or edema in extremities. \  BD- soft, ND, overweight  SKIN -there is some redness over his nose, various areas of pimples and abrasions that the patient admits to picking himself    Right knee: 15cm surgical scar diagonally medial knee and 4cm vertical scar laterally, both tender to palpation. No effusion, or erythema, some warmth noted, multiple small scabs on knee, one bleeding.  No popliteal fullness.  No anterior, posterior, medial or lateral instability.  Significant tenderness over the right pes anserine reminiscent of his pain..  There is no significant pain with hyperflexion of the knee.  There is no pain with varus or valgus stressing of the knee during flexion and extension.  There is no crepitus.  There is medial joint line tenderness. Patellar grind negative.  Left knee: normal exam.    NEURO -   LE strength 5/5 -  including hip flexors, knee flexors, knee extensors, ankle dorsiflexors, ankle plantar flexors, and EHL   Sensation - intact to light touch in bilateral lower extremities except for decreased to light touch right anterior knee both medial and lateral.  Reflexes - 3+ patellar and 2+ Achilles reflexes bilaterally.  1 or 2 nonsustained beats of clonus bilaterally, normal reflexes in the upper extremities, negative Estefania's, No Babinski.  GAIT - Normal base, normal stride length, antalgic.  Slow able to toe walk and heel walk without difficulty but with pain. Able to perform tandem gait without difficulty but painful.    IMPRESSION:    This  is a pleasant 49-year-old right-handed man with past medical history of HTN, anxiety/depression, right tibial fracture, and chronic knee pain presenting with right knee pain.   Physical exam is reassuring for lack of neurologic compromise, there is some hyperreflexia in bilateral lower extremities.  Symptoms of physical exam findings consistent with pes anserinus bursitis rather than internal knee derangement.      1. Patient Education: Extensive time was spent educating the patient on relevant anatomy, clinical findings and imaging, as well as discussing the potential diagnoses as discussed above.      2. Pharmacology: Pt can continue to use NSAIDs as needed during flare-ups. Pt was also encouraged to use ice/heat. Try voltaren gel QID for 2 weeks. Medrol Dosepak ordered. Continue other medications for now.     3. Exercise: The patient was given a prescription for PT. Modalities such as US, heat/ice, TENS to decrease pain can also be employed. We discussed the importance of maintaining a daily exercise program, including stretching and strengthening. Preventative strategies were reviewed, specifically avoidance of any exercises that exacerbate pain.     4. Imaging: consider lumbar and knee MRI in future.     5. Interventional: Pes anserine bursa injection with ultrasound guidance at next available visit. Consider knee gel injections.      6. Referral: consider rheumatology referral in future      7. Return to clinic for ultrasound guided pes anserine bursa injection next available appointment.        The patient expressed understanding and agreement with the assessment and plan. Patient encouraged to contact us should they have any questions, concerns, or any changes in symptoms.      Thank you for allowing me to participate in the care of your patient.       Dustin Gray MBA DO, PGY-2  [facility]  PM&R      Patient seen and discussed with the resident. I agree with the above assessment and plan.     ** Dictated with voice recognition software, please forgive any errors in grammar and/or spelling **

## 2024-01-31 ENCOUNTER — OFFICE VISIT (OUTPATIENT)
Dept: PHYSICAL MEDICINE AND REHAB | Facility: CLINIC | Age: 49
End: 2024-01-31
Payer: COMMERCIAL

## 2024-01-31 VITALS
BODY MASS INDEX: 33.64 KG/M2 | WEIGHT: 235 LBS | TEMPERATURE: 98.5 F | SYSTOLIC BLOOD PRESSURE: 143 MMHG | HEIGHT: 70 IN | DIASTOLIC BLOOD PRESSURE: 90 MMHG | HEART RATE: 110 BPM | OXYGEN SATURATION: 96 %

## 2024-01-31 DIAGNOSIS — G89.29 CHRONIC PAIN OF RIGHT KNEE: ICD-10-CM

## 2024-01-31 DIAGNOSIS — G89.29 CHRONIC PAIN OF BOTH SHOULDERS: ICD-10-CM

## 2024-01-31 DIAGNOSIS — G89.29 CHRONIC LOW BACK PAIN, UNSPECIFIED BACK PAIN LATERALITY, UNSPECIFIED WHETHER SCIATICA PRESENT: ICD-10-CM

## 2024-01-31 DIAGNOSIS — M25.512 CHRONIC PAIN OF BOTH SHOULDERS: ICD-10-CM

## 2024-01-31 DIAGNOSIS — M17.31 POST-TRAUMATIC OSTEOARTHRITIS OF RIGHT KNEE: ICD-10-CM

## 2024-01-31 DIAGNOSIS — M25.511 CHRONIC PAIN OF BOTH SHOULDERS: ICD-10-CM

## 2024-01-31 DIAGNOSIS — M54.50 CHRONIC LOW BACK PAIN, UNSPECIFIED BACK PAIN LATERALITY, UNSPECIFIED WHETHER SCIATICA PRESENT: ICD-10-CM

## 2024-01-31 DIAGNOSIS — M70.51 PES ANSERINUS BURSITIS OF RIGHT KNEE: Primary | ICD-10-CM

## 2024-01-31 DIAGNOSIS — M79.18 MYOFASCIAL PAIN SYNDROME: ICD-10-CM

## 2024-01-31 DIAGNOSIS — M54.2 NECK PAIN: ICD-10-CM

## 2024-01-31 DIAGNOSIS — M25.561 CHRONIC PAIN OF RIGHT KNEE: ICD-10-CM

## 2024-01-31 PROCEDURE — 3008F BODY MASS INDEX DOCD: CPT | Performed by: PHYSICAL MEDICINE & REHABILITATION

## 2024-01-31 PROCEDURE — 99205 OFFICE O/P NEW HI 60 MIN: CPT | Performed by: PHYSICAL MEDICINE & REHABILITATION

## 2024-01-31 RX ORDER — METHYLPREDNISOLONE 4 MG/1
TABLET ORAL
Qty: 21 TABLET | Refills: 0 | Status: SHIPPED | OUTPATIENT
Start: 2024-01-31 | End: 2024-05-08 | Stop reason: WASHOUT

## 2024-01-31 ASSESSMENT — PAIN SCALES - GENERAL: PAINLEVEL: 3

## 2024-01-31 NOTE — LETTER
January 31, 2024     Aldo Forrester MD  [address] [place] [address]   [place] [facility], [address]  [place] [address]    Patient: Cecil Ruiz   YOB: 1975   Date of Visit: 1/31/2024       Dear Dr. Aldo Forrester MD:    Thank you for referring Cecil Ruiz to me for evaluation. Below are my notes for this consultation.  If you have questions, please do not hesitate to call me. I look forward to following your patient along with you.       Sincerely,     Yolanda Naidu MD      CC: Edwin Slade MD PhD  ______________________________________________________________________________________    Chief complaint:     Dear Dr. Slade    I had the pleasure of seeing your patient, Cecil Ruiz in clinic today.  As you know, he is a pleasant 49-year-old right-handed man with past medical history of HTN, HLD, anxiety/depression, right tibial fracture, and chronic knee pain presenting with right knee pain.    TIMELINE OF COMPLAINT(S):  Right knee arthritis and chronic knee pain after a traumatic tibial fracture in 2007 and surgical repair. Patient states knee started hurting near hardware 2 years post surgery. Meniscus torn September 2021, Dr. Saavedra performed laparoscopic meniscus repair. He has not been able to work since 2021.  He is s/p genicular RFA in September 2023 which did not help. He says he is ready for a knee replacement but 3-4 orthopedic surgeons say he is not ready due to age and radiographs. He has history of cocaine use, sober since 7/2023. He states he was started on topiramate 3 months ago and developed a rash on his face 1 month ago.  Patient also reports low back pain that is long standing but would like to focus on his knee pain at today's visit.    -He has some occasional urge bowel incontinence, denies any urine ncontinence or saddle anesthesia.    Pain:  LOCATION- medial right knee pointing to the pes anserinus area  RADIATION- no  ASSOCIATED WITH- Buckling  NUMBNESS/TINGLING-  "No  WEAKNESS- No  CONSTANT or INTERMITTENT- Constant  SEVERITY/QUANTITY- 3  QUALITY- Achy, burning along scars  EXACERBATED BY- Stairs, inclines, squats  BETTER WITH- Ice  TRIED- Ibuprofen, Topiramate      Anti-Inflammatories: ketorolac (didn't help), prednisone, ibuprofen, meloxicam, never tried Voltaren gel      Muscle relaxants: clonazepam (for panic), cyclobenzaprine, (doesn't remember).      Anti-depressants: multiple over years, no SNRI or TCA, currently on Abilify, Lexapro, and Buspar      Neuroleptics: Topiramate helped significantly, gabapentin (300mg TID),       LDN:    PHYSICAL THERAPY: Yes, 1-2yrs ago.  No home exercises  TENS unit: No  CHIROPRACTIC MANIPULATION: No  ACUPUNCTURE TREATMENTS: No  DEEP TISSUE MASSAGE THERAPY: No  OSTEOPATHIC MANIPULATION THERAPY: No  INJECTIONS: Previous cortisone and \"cortisone like injection\" did not help.  He does not think he had any hyaluronic acid injections, genicular nerve block 8/2023 (seemed to help), RFA 9/2023 (didn't help)  EMG/NCS: No    IMAGING: Yes      === 05/04/23 ===    XR KNEE COMPLETE 4 OR MORE VIEWS  Surgical plate and numerous surgical screws are again seen throughout  the proximal right tibia. Redemonstrated plateau fracture is  unchanged in appearance compared to prior imaging. No new displaced  fractures are evident. Normal bone mineralization. No sizable  suprapatellar joint effusion of the right knee.  - Impression -  Postsurgical changes of the proximal right tibia without significant  change compared to prior imaging.    XR R Knee 5/31/23  Postsurgical changes ORIF right proximal tibia with multiple intact   surgical screws.  There is mild osteoarthritis lateral compartment with   joint space narrowing and small marginal osteophytes.  Small   patellofemoral osteophytes are     FUNCTIONAL HISTORY: The patient is independent in all ADLs, mobility, and driving. The patient does use a cane for long distances only.    SH:  Lives in: [facility] " "with: Daughter  Occupation: [employer]  Tobacco: Former, quit 2010. One pack daily  Alcohol: \"lots\"  Drugs: Former, quit cocaine in July 2023    ROS: The patient denies any bowel or bladder incontinence/accidents, night sweats, fevers, chills, recent significant weight loss. A 14 point review of systems was reviewed with the patient and is as above and otherwise negative.  ROS questionnaire positive for fatigue, intermittent urge bowel incontinence, joint pain, back pain, rash, depression, anxiety, sleep disturbances, limited range of motion    Physical Exam  Constitutional:           Comments: Right knee pain.        PHYSICAL EXAM    GEN - Alert, well-developed, well-nourished, no acute distress  PSYCH - Cooperative, appropriate mood and affect  HEENT - NC/AT, erythematous rash to bridge of nose, legs.  RESP - Non-labored respirations, equal expansion  CV - warm and well-perfused, No cyanosis or edema in extremities. \  BD- soft, ND, overweight  SKIN -there is some redness over his nose, various areas of pimples and abrasions that the patient admits to picking himself    Right knee: 15cm surgical scar diagonally medial knee and 4cm vertical scar laterally, both tender to palpation. No effusion, or erythema, some warmth noted, multiple small scabs on knee, one bleeding.  No popliteal fullness.  No anterior, posterior, medial or lateral instability.  Significant tenderness over the right pes anserine reminiscent of his pain..  There is no significant pain with hyperflexion of the knee.  There is no pain with varus or valgus stressing of the knee during flexion and extension.  There is no crepitus.  There is medial joint line tenderness. Patellar grind negative.  Left knee: normal exam.    NEURO -   LE strength 5/5 -  including hip flexors, knee flexors, knee extensors, ankle dorsiflexors, ankle plantar flexors, and EHL   Sensation - intact to light touch in bilateral lower extremities except for decreased to light touch " right anterior knee both medial and lateral.  Reflexes - 3+ patellar and 2+ Achilles reflexes bilaterally.  1 or 2 nonsustained beats of clonus bilaterally, normal reflexes in the upper extremities, negative Estefania's, No Babinski.  GAIT - Normal base, normal stride length, antalgic.  Slow able to toe walk and heel walk without difficulty but with pain. Able to perform tandem gait without difficulty but painful.    IMPRESSION:    This  is a pleasant 49-year-old right-handed man with past medical history of HTN, anxiety/depression, right tibial fracture, and chronic knee pain presenting with right knee pain.  Physical exam is reassuring for lack of neurologic compromise, there is some hyperreflexia in bilateral lower extremities.  Symptoms of physical exam findings consistent with pes anserinus bursitis rather than internal knee derangement.      1. Patient Education: Extensive time was spent educating the patient on relevant anatomy, clinical findings and imaging, as well as discussing the potential diagnoses as discussed above.      2. Pharmacology: Pt can continue to use NSAIDs as needed during flare-ups. Pt was also encouraged to use ice/heat. Try voltaren gel QID for 2 weeks. Medrol Dosepak ordered. Continue other medications for now.     3. Exercise: The patient was given a prescription for PT. Modalities such as US, heat/ice, TENS to decrease pain can also be employed. We discussed the importance of maintaining a daily exercise program, including stretching and strengthening. Preventative strategies were reviewed, specifically avoidance of any exercises that exacerbate pain.     4. Imaging: consider lumbar and knee MRI in future.     5. Interventional: Pes anserine bursa injection with ultrasound guidance at next available visit. Consider knee gel injections.      6. Referral: consider rheumatology referral in future      7. Return to clinic for ultrasound guided pes anserine bursa injection next available  appointment.        The patient expressed understanding and agreement with the assessment and plan. Patient encouraged to contact us should they have any questions, concerns, or any changes in symptoms.      Thank you for allowing me to participate in the care of your patient.       Dustin Gray MBA DO, PGY-2  [facility]  PM&R      Patient seen and discussed with the resident. I agree with the above assessment and plan.     ** Dictated with voice recognition software, please forgive any errors in grammar and/or spelling **

## 2024-02-01 DIAGNOSIS — M70.51 PES ANSERINUS BURSITIS OF RIGHT KNEE: Primary | ICD-10-CM

## 2024-03-18 NOTE — PATIENT INSTRUCTIONS
-Ice on and off for the next 24 hours if injection sites are sore. Do gentle range of motion exercises in each area that was injected. Try to do them every hour for about half a minute or so, in every direction that the affected part goes. No pool, bath, or hot tub today. Avoid heavy lifting for the next 2 days.    -Try Voltaren gel on the area of pain 4 times per day for 2 weeks straight and then as needed up to 4 times per day.  This is over-the-counter.    -Continue other medications for now  -Consider other medications in the future  -Consider knee gel injections  -Consider referral to rheumatology in the future  -Physical therapy referral provided  -Consider lumbar MRI, knee MRI in the future  -Follow-up 4-6 weeks

## 2024-03-18 NOTE — PROGRESS NOTES
Chief complaint:  right knee pain. Follow up    This s a pleasant 49-year-old right-handed man with past medical history of HTN, HLD, anxiety/depression, right tibial fracture, and chronic knee pain who presents for follow up of  right knee pain.    He was last seen here on 1/31/24, at which point I advised him to try voltaren gel and ordered a medrol dose pack. Voltaren gel was too expensive. Medrol did not help with pain but it helped with the skin.     I referred him to PT and gave him exercises to do.  He did not schedule physical therapy and has not been doing exercises, depression is holding him back.  He is seeing his psychiatrist again in 2 weeks.    He is here today for Ultrasound-guided pes anserinus bursitis injection on the right    He rates his pain as a 3/10    Otherwise, there have been no changes to medications or past medical history since the last visit.      __________________________  3/19/24: Right ultrasound-guided pes anserinus bursitis injection, try Voltaren gel, consider other medication injections, FMLA paperwork filled out again, proceed with physical therapy and daily home exercises    _________________________  As a reminder:    TIMELINE OF COMPLAINT(S):  Right knee arthritis and chronic knee pain after a traumatic tibial fracture in 2007 and surgical repair. Patient states knee started hurting near hardware 2 years post surgery. Meniscus torn September 2021, Dr. Saavedra performed laparoscopic meniscus repair. He has not been able to work since 2021.  He is s/p genicular RFA in September 2023 which did not help. He says he is ready for a knee replacement but 3-4 orthopedic surgeons say he is not ready due to age and radiographs. He has history of cocaine use, sober since 7/2023. He states he was started on topiramate 3 months ago and developed a rash on his face 1 month ago.  Patient also reports low back pain that is long standing but would like to focus on his knee pain at today's  "visit.    -He has some occasional urge bowel incontinence, denies any urine ncontinence or saddle anesthesia.    Pain:  LOCATION- medial right knee pointing to the pes anserinus area  RADIATION- no  ASSOCIATED WITH- Buckling  NUMBNESS/TINGLING- No  WEAKNESS- No  CONSTANT or INTERMITTENT- Constant  SEVERITY/QUANTITY- 3  QUALITY- Achy, burning along scars  EXACERBATED BY- Stairs, inclines, squats  BETTER WITH- Ice  TRIED- Ibuprofen, Topiramate      Anti-Inflammatories: ketorolac (didn't help), prednisone, ibuprofen, meloxicam, never tried Voltaren gel      Muscle relaxants: clonazepam (for panic), cyclobenzaprine, (doesn't remember).      Anti-depressants: multiple over years, no SNRI or TCA, currently on Abilify, Lexapro, and Buspar      Neuroleptics: Topiramate helped significantly, gabapentin (300mg TID),       LDN:    PHYSICAL THERAPY: Yes, 1-2yrs ago.  No home exercises  TENS unit: No  CHIROPRACTIC MANIPULATION: No  ACUPUNCTURE TREATMENTS: No  DEEP TISSUE MASSAGE THERAPY: No  OSTEOPATHIC MANIPULATION THERAPY: No  INJECTIONS: Previous cortisone and \"cortisone like injection\" did not help.  He does not think he had any hyaluronic acid injections, genicular nerve block 8/2023 (seemed to help), RFA 9/2023 (didn't help)  EMG/NCS: No    IMAGING: Yes      === 05/04/23 ===    XR KNEE COMPLETE 4 OR MORE VIEWS  Surgical plate and numerous surgical screws are again seen throughout  the proximal right tibia. Redemonstrated plateau fracture is  unchanged in appearance compared to prior imaging. No new displaced  fractures are evident. Normal bone mineralization. No sizable  suprapatellar joint effusion of the right knee.  - Impression -  Postsurgical changes of the proximal right tibia without significant  change compared to prior imaging.    XR R Knee 5/31/23  Postsurgical changes ORIF right proximal tibia with multiple intact   surgical screws.  There is mild osteoarthritis lateral compartment with   joint space narrowing " "and small marginal osteophytes.  Small   patellofemoral osteophytes are     FUNCTIONAL HISTORY: The patient is independent in all ADLs, mobility, and driving. The patient does use a cane for long distances only.    SH:  Lives in: [place]   Lives with: Daughter  Occupation: [employer]  Tobacco: Former, quit 2010. One pack daily  Alcohol: \"lots\"  Drugs: Former, quit cocaine in July 2023    ____________________________  ROS: The patient denies any bowel or bladder incontinence/accidents, night sweats, fevers, chills, recent significant weight loss. A 14 point review of systems was reviewed with the patient and is as above and otherwise negative.  ROS questionnaire positive for fatigue, joint pain, back pain, depression, anxiety, sleep disturbances      PHYSICAL EXAM    GEN - Alert, well-developed, well-nourished, no acute distress  PSYCH - Cooperative, appropriate mood and affect  HEENT - NC/AT, erythematous rash to bridge of nose, legs.  RESP - Non-labored respirations, equal expansion  CV - warm and well-perfused, No cyanosis or edema in extremities.   BD- soft, ND, overweight  SKIN -no rash    Previous:    Right knee: 15cm surgical scar diagonally medial knee and 4cm vertical scar laterally, both tender to palpation. No effusion, or erythema, some warmth noted, multiple small scabs on knee, one bleeding.  No popliteal fullness.  No anterior, posterior, medial or lateral instability.  Significant tenderness over the right pes anserine reminiscent of his pain..  There is no significant pain with hyperflexion of the knee.  There is no pain with varus or valgus stressing of the knee during flexion and extension.  There is no crepitus.  There is medial joint line tenderness. Patellar grind negative.  Left knee: normal exam.    NEURO -   LE strength 5/5 -  including hip flexors, knee flexors, knee extensors, ankle dorsiflexors, ankle plantar flexors, and EHL   Sensation - intact to light touch in bilateral lower extremities " except for decreased to light touch right anterior knee both medial and lateral.  Reflexes - 3+ patellar and 2+ Achilles reflexes bilaterally.  1 or 2 nonsustained beats of clonus bilaterally, normal reflexes in the upper extremities, negative Estefania's, No Babinski.  GAIT - Normal base, normal stride length, antalgic.  Slow able to toe walk and heel walk without difficulty but with pain. Able to perform tandem gait without difficulty but painful.    PROCEDURE:      Lidocaine 1%- 1 cc's used, 0 cc's wasted  200mg/20mL (10mg/mL)  NDC 3674-8781-52  LOT [id number]  EXP 03/01/2025  Manuf: [facility]    Kenalog 40- 1 cc's used, 0 cc's wasted  NDC 2243-9337-96  LOT [id number]  EXP 11/2025  Manuf: Ask The Doctor      Procedure:    Ultrasound guided Right pes anserine corticosteroid injection:    Images pre-and during procedure were obtained and recorded in the ultrasound tablet and PACs system. MCL,pes anserine bursitis base with a small amount of fluid was visible and targeted injection site was visible and obtained.    Description of the Procedure: The procedure, risks and alternative treatments were discussed with the patient. After written informed consent was obtained, the area of most tenderness was identified over the right pes anserine tendons while the patient was supine. The area was marked. The skin was prepped three times with alcohol. Using a 27 gauge 1.5 inch needle, after negative aspiration, the area was injected with a total of 1 cc of 1% lidocaine and 1 cc of Kenalog 40 MG using ultrasound guidance. The patient tolerated the procedure well with no immediate complications or bleeding. There was mild reduction in pain after the procedure.    Plan:   1. The patient was instructed in post-procedural care.  2. The patient was asked to apply moist heat and or ice for the next 24 hours and to perform daily gentle stretching exercises.     Physical Exam  Musculoskeletal:        Legs:             IMPRESSION:    This   is a pleasant 49-year-old right-handed man with past medical history of HTN, anxiety/depression, right tibial fracture, and chronic knee pain who presents for follow-up of right knee pain.  Physical exam is reassuring for lack of neurologic compromise, there is some hyperreflexia in bilateral lower extremities.  Symptoms of physical exam findings consistent with pes anserinus bursitis rather than internal knee derangement.      -Right ultrasound-guided pes anserinus bursitis injection performed as above.  There were no complications and he tolerated the procedure well.  He was provided with postprocedure instructions.  -Try Voltaren gel on the area of pain 4 times per day for 2 weeks straight and then as needed up to 4 times per day.  This is over-the-counter.    -Continue other medications for now  -Consider other medications in the future  -Consider knee gel injections  -Consider referral to rheumatology in the future  -Physical therapy referral provided  -Consider lumbar MRI, knee MRI in the future  -Follow-up 4-6 weeks  '       The patient expressed understanding and agreement with the assessment and plan. Patient encouraged to contact us should they have any questions, concerns, or any changes in symptoms.      Thank you for allowing me to participate in the care of your patient.       ** Dictated with voice recognition software, please forgive any errors in grammar and/or spelling **

## 2024-03-19 ENCOUNTER — PROCEDURE VISIT (OUTPATIENT)
Dept: PHYSICAL MEDICINE AND REHAB | Facility: CLINIC | Age: 49
End: 2024-03-19
Payer: COMMERCIAL

## 2024-03-19 VITALS
WEIGHT: 237 LBS | DIASTOLIC BLOOD PRESSURE: 72 MMHG | HEIGHT: 70 IN | TEMPERATURE: 97.3 F | SYSTOLIC BLOOD PRESSURE: 123 MMHG | HEART RATE: 75 BPM | BODY MASS INDEX: 33.93 KG/M2 | OXYGEN SATURATION: 96 %

## 2024-03-19 DIAGNOSIS — G89.29 CHRONIC LOW BACK PAIN, UNSPECIFIED BACK PAIN LATERALITY, UNSPECIFIED WHETHER SCIATICA PRESENT: ICD-10-CM

## 2024-03-19 DIAGNOSIS — M25.512 CHRONIC PAIN OF BOTH SHOULDERS: ICD-10-CM

## 2024-03-19 DIAGNOSIS — M54.50 CHRONIC LOW BACK PAIN, UNSPECIFIED BACK PAIN LATERALITY, UNSPECIFIED WHETHER SCIATICA PRESENT: ICD-10-CM

## 2024-03-19 DIAGNOSIS — M79.18 MYOFASCIAL PAIN SYNDROME: ICD-10-CM

## 2024-03-19 DIAGNOSIS — G89.29 CHRONIC PAIN OF RIGHT KNEE: Primary | ICD-10-CM

## 2024-03-19 DIAGNOSIS — G89.29 CHRONIC PAIN OF BOTH SHOULDERS: ICD-10-CM

## 2024-03-19 DIAGNOSIS — M54.2 NECK PAIN: ICD-10-CM

## 2024-03-19 DIAGNOSIS — M25.511 CHRONIC PAIN OF BOTH SHOULDERS: ICD-10-CM

## 2024-03-19 DIAGNOSIS — M70.51 PES ANSERINUS BURSITIS OF RIGHT KNEE: ICD-10-CM

## 2024-03-19 DIAGNOSIS — M17.31 POST-TRAUMATIC OSTEOARTHRITIS OF RIGHT KNEE: ICD-10-CM

## 2024-03-19 DIAGNOSIS — M25.561 CHRONIC PAIN OF RIGHT KNEE: Primary | ICD-10-CM

## 2024-03-19 PROCEDURE — 76942 ECHO GUIDE FOR BIOPSY: CPT | Performed by: PHYSICAL MEDICINE & REHABILITATION

## 2024-03-19 PROCEDURE — 99214 OFFICE O/P EST MOD 30 MIN: CPT | Performed by: PHYSICAL MEDICINE & REHABILITATION

## 2024-03-19 PROCEDURE — 20551 NJX 1 TENDON ORIGIN/INSJ: CPT | Performed by: PHYSICAL MEDICINE & REHABILITATION

## 2024-03-19 RX ORDER — TRIAMCINOLONE ACETONIDE 40 MG/ML
40 INJECTION, SUSPENSION INTRA-ARTICULAR; INTRAMUSCULAR ONCE
Status: COMPLETED | OUTPATIENT
Start: 2024-03-19 | End: 2024-03-19

## 2024-03-19 RX ADMIN — TRIAMCINOLONE ACETONIDE 40 MG: 40 INJECTION, SUSPENSION INTRA-ARTICULAR; INTRAMUSCULAR at 14:29

## 2024-03-19 ASSESSMENT — PAIN SCALES - GENERAL: PAINLEVEL: 3

## 2024-03-25 NOTE — PATIENT INSTRUCTIONS
-Try Voltaren gel on the area of pain 4 times per day for 2 weeks straight and then as needed up to 4 times per day.  This is over-the-counter.    -Medrol Dose pack ordered  -Continue other medications for now  -Consider other medications in the future  -Consider injections: Ultrasound-guided pes anserinus bursitis injection, knee gel injections  -Consider referral to rheumatology in the future  -Physical therapy referral provided  -Consider lumbar MRI, knee MRI in the future  -Follow-up next available Ultrasound-guided pes anserinus bursitis injection on the right   98.4

## 2024-03-26 ENCOUNTER — TELEMEDICINE (OUTPATIENT)
Dept: BEHAVIORAL HEALTH | Facility: CLINIC | Age: 49
End: 2024-03-26
Payer: COMMERCIAL

## 2024-03-26 DIAGNOSIS — F41.8 ANXIETY WITH DEPRESSION: ICD-10-CM

## 2024-03-26 DIAGNOSIS — F43.10 PTSD (POST-TRAUMATIC STRESS DISORDER): ICD-10-CM

## 2024-03-26 PROCEDURE — 3008F BODY MASS INDEX DOCD: CPT | Performed by: PSYCHIATRY & NEUROLOGY

## 2024-03-26 PROCEDURE — 99214 OFFICE O/P EST MOD 30 MIN: CPT | Performed by: PSYCHIATRY & NEUROLOGY

## 2024-03-26 RX ORDER — BUSPIRONE HYDROCHLORIDE 15 MG/1
15 TABLET ORAL 3 TIMES DAILY
Qty: 270 TABLET | Refills: 1 | Status: SHIPPED | OUTPATIENT
Start: 2024-03-26 | End: 2024-05-08 | Stop reason: WASHOUT

## 2024-03-26 RX ORDER — QUETIAPINE FUMARATE 25 MG/1
TABLET, FILM COATED ORAL
Qty: 30 TABLET | Refills: 2 | Status: SHIPPED | OUTPATIENT
Start: 2024-03-26 | End: 2024-06-01 | Stop reason: SDUPTHER

## 2024-03-26 RX ORDER — VENLAFAXINE HYDROCHLORIDE 150 MG/1
150 CAPSULE, EXTENDED RELEASE ORAL DAILY
Qty: 90 CAPSULE | Refills: 0 | Status: SHIPPED | OUTPATIENT
Start: 2024-03-26 | End: 2024-06-01 | Stop reason: SDUPTHER

## 2024-03-26 RX ORDER — ARIPIPRAZOLE 10 MG/1
10 TABLET ORAL DAILY
Qty: 90 TABLET | Refills: 1 | Status: SHIPPED | OUTPATIENT
Start: 2024-03-26 | End: 2024-06-01 | Stop reason: SDUPTHER

## 2024-03-26 RX ORDER — VENLAFAXINE HYDROCHLORIDE 75 MG/1
CAPSULE, EXTENDED RELEASE ORAL
Qty: 6 CAPSULE | Refills: 0 | Status: SHIPPED | OUTPATIENT
Start: 2024-03-26 | End: 2024-05-08 | Stop reason: WASHOUT

## 2024-03-26 RX ORDER — PRAZOSIN HYDROCHLORIDE 1 MG/1
1 CAPSULE ORAL NIGHTLY
Qty: 30 CAPSULE | Refills: 2 | Status: SHIPPED | OUTPATIENT
Start: 2024-03-26 | End: 2024-06-01 | Stop reason: SDUPTHER

## 2024-03-26 ASSESSMENT — ENCOUNTER SYMPTOMS
DYSPHORIC MOOD: 1
DEPRESSION: 1
HALLUCINATIONS: 0

## 2024-03-26 NOTE — PROGRESS NOTES
Subjective   Patient ID: Cecil Ruiz is a 49 y.o. male.    DepressionPatient is not experiencing: suicidal ideas.        Chief Complaint   Patient presents with    Depression       Pt denies use of substances other than prescription medications;  Pt takes seroquel 25 mg at bedtime;  Major stressor with dtr -- dtr diagnosed with seizure d/o and mental health disorders  +depression over past decisions and actions;    Patient Active Problem List   Diagnosis    Abdominal pain    Back pain    Allergic rhinitis due to dust mite    Allergy    Anxiety disorder    Anxiety with depression    Arm pain    Bilateral shoulder pain    Left elbow pain    Arthritis of right knee    Asthma    Back injury    Benign fasciculation-cramp syndrome    Cervical facet syndrome    Change in bowel habit    Cough    Deviated septum    Dizziness    Excessive daytime sleepiness    Gall bladder polyp    Hypocholesterolemia    Irritable bowel syndrome without diarrhea    Left inguinal hernia    Lipoma of testis    Moderate persistent asthma without complication    Myofascial pain syndrome    Neck pain    Neuromyotonia    FAMILIA (obstructive sleep apnea)    Pain of right heel    Polycythemia vera (CMS/HCC)    Polysubstance abuse (CMS/HCC)    Rash    Post-nasal drainage    Recurrent umbilical hernia with incarceration    Allergic rhinitis due to mold    Arthritis    Bursitis    Rhinitis    Tonsillitis    Rib pain on right side    Right knee pain    Ringworm    Scrotal mass    Testicular lesion    Alcohol dependence in remission (CMS/HCC)    Cannabis use disorder, mild, abuse    Cocaine use disorder, moderate, dependence (CMS/HCC)    Knee pain    Post-traumatic osteoarthritis of right knee    Severe stimulant use disorder (CMS/HCC)    Radiculopathy of cervical spine       Current Outpatient Medications:     albuterol 90 mcg/actuation inhaler, Inhale 2 puffs every 4 hours if needed., Disp: , Rfl:     ammonium lactate (Lac-Hydrin) 12 % lotion, APPLY  AND RUB IN A THIN LAYER TO AFFECTED AREA TWICE DAILY, Disp: , Rfl:     ARIPiprazole (Abilify) 10 mg tablet, Take 1 tablet (10 mg) by mouth once daily., Disp: 90 tablet, Rfl: 1    azelastine 205.5 mcg (0.15 %) spray,non-aerosol, Administer into affected nostril(s)., Disp: , Rfl:     busPIRone (Buspar) 15 mg tablet, Take 1 tablet (15 mg) by mouth 3 times a day., Disp: 270 tablet, Rfl: 1    clonazePAM (KlonoPIN) 0.5 mg tablet, Take by mouth., Disp: , Rfl:     escitalopram (Lexapro) 20 mg tablet, Take 2 tablets (40 mg) by mouth once daily., Disp: 180 tablet, Rfl: 1    fluticasone-umeclidin-vilanter (TRELEGY-ELLIPTA) 100-62.5-25 mcg blister with device, Inhale., Disp: , Rfl:     loratadine (Claritin) 10 mg tablet, Take 1 tablet (10 mg) by mouth once daily., Disp: 90 tablet, Rfl: 0    methylPREDNISolone (Medrol Dospak) 4 mg tablets, Follow schedule on package instructions, Disp: 21 tablet, Rfl: 0    metroNIDAZOLE (Metrogel) 1 % gel, Apply topically once daily., Disp: 60 g, Rfl: 3    montelukast (Singulair) 10 mg tablet, Take 1 tablet (10 mg) by mouth once daily., Disp: , Rfl:     multivit with minerals/lutein (MULTIVITAMIN 50 PLUS ORAL), Take 1 tablet by mouth once daily., Disp: , Rfl:     prazosin (Minipress) 1 mg capsule, Take 1 capsule (1 mg) by mouth once daily at bedtime., Disp: 30 capsule, Rfl: 2    rosuvastatin (Crestor) 10 mg tablet, Take 1 tablet (10 mg) by mouth once daily at bedtime., Disp: , Rfl:     topiramate (Topamax) 100 mg tablet, Take 1 tablet (100 mg) by mouth 2 times a day., Disp: 60 tablet, Rfl: 11    venlafaxine XR (Effexor XR) 150 mg 24 hr capsule, Take 1 capsule (150 mg) by mouth once daily. Do not crush or chew., Disp: 90 capsule, Rfl: 0    venlafaxine XR (Effexor XR) 75 mg 24 hr capsule, Take 1 cap daily for 6 days Do not crush or chew., Disp: 6 capsule, Rfl: 0    Review of Systems   Psychiatric/Behavioral:  Positive for depression and dysphoric mood. Negative for hallucinations and suicidal  ideas.        Objective   Physical Exam  Psychiatric:         Mood and Affect: Mood is depressed.         Speech: Speech normal.         Behavior: Behavior is cooperative.         Thought Content: Thought content does not include homicidal or suicidal ideation.         Judgment: Judgment normal.      Comments: Affect: congruent         Assessment/Plan   Diagnoses and all orders for this visit:  Anxiety with depression  -     venlafaxine XR (Effexor XR) 75 mg 24 hr capsule; Take 1 cap daily for 6 days Do not crush or chew.  -     venlafaxine XR (Effexor XR) 150 mg 24 hr capsule; Take 1 capsule (150 mg) by mouth once daily. Do not crush or chew.  -     Follow Up In Psychiatry; Future    Cross taper as follows:   1 tab lexapro + 1 cap 75 mg effexor xr x 6 days;  On day 7----->stop lexapro altogether and take Effexor  mg capsule daily thereafter;

## 2024-03-26 NOTE — PATIENT INSTRUCTIONS
Keep next scheduled follow up visit;  ---after business hours and on weekends: call [phone] to reach the answering service  ---for appointments and medication refills and any questions or concerns call [phone]  ---if you run out of your medication or need more refills between visits, call our office: [phone]  ---if you need immediate assistance or in case of emergency, dial 911 or go to the nearest emergency room

## 2024-05-03 ENCOUNTER — EVALUATION (OUTPATIENT)
Dept: PHYSICAL THERAPY | Facility: CLINIC | Age: 49
End: 2024-05-03
Payer: COMMERCIAL

## 2024-05-03 DIAGNOSIS — G89.29 CHRONIC LOW BACK PAIN, UNSPECIFIED BACK PAIN LATERALITY, UNSPECIFIED WHETHER SCIATICA PRESENT: ICD-10-CM

## 2024-05-03 DIAGNOSIS — M70.51 PES ANSERINUS BURSITIS OF RIGHT KNEE: ICD-10-CM

## 2024-05-03 DIAGNOSIS — G89.29 CHRONIC PAIN OF RIGHT KNEE: Primary | ICD-10-CM

## 2024-05-03 DIAGNOSIS — M54.50 CHRONIC LOW BACK PAIN, UNSPECIFIED BACK PAIN LATERALITY, UNSPECIFIED WHETHER SCIATICA PRESENT: ICD-10-CM

## 2024-05-03 DIAGNOSIS — M25.561 CHRONIC PAIN OF RIGHT KNEE: Primary | ICD-10-CM

## 2024-05-03 PROCEDURE — 97161 PT EVAL LOW COMPLEX 20 MIN: CPT | Mod: GP

## 2024-05-03 PROCEDURE — 97110 THERAPEUTIC EXERCISES: CPT | Mod: GP

## 2024-05-03 NOTE — PROGRESS NOTES
Physical Therapy Evaluation     Patient Name: Cecil Ruiz  MRN: [id number]  Today's Date: 5/3/2024  Time Calculation  Start Time: 1003  Stop Time: 1049  Time Calculation (min): 46 min      Insurance:  Number of Treatments Authorized: 1/60 ($30.00 CO PAY, 100% COVERAGE, 60V PT/OT, [employer], [employer])          Current Problem:   1. Chronic pain of right knee  Referral to Physical Therapy    Follow Up In Physical Therapy      2. Pes anserinus bursitis of right knee  Referral to Physical Therapy    Follow Up In Physical Therapy      3. Chronic low back pain, unspecified back pain laterality, unspecified whether sciatica present  Referral to Physical Therapy    Follow Up In Physical Therapy          Precautions:  Precautions  Precautions Comment: No Fall risk    Reason for visit: Leg/core, R knee and LBP   Referred by: Dr. Naidu    Work, mechanical stresses: Was off work for 2 years during COVID due to comorbidity Works for [employer] - Standing. Twisting/squat/lift/carry 40-50#s.   Leisure, mechanical stresses:  Swimming, walking/hike, bike- endurance is limited.   Prior to onset the pt was able to complete all work/leisure/functional activities without limitation.  Functional disability from present episode/Primary goal for PT: Self limited with squatting/lifting because he is concerned it may aggravate something/Use the pain relief from the injection to strengthen core and right leg, to get ready to return to work August 1st.    Present symptoms: Low back and Right knee  Present since: September 2021 and getting better  Commenced as a result  of going up stairs/twisted on the last step   Symptoms at onset: R knee  Constant symptoms: none  Intermittent symptoms: anterior/medial  R knee  Pain ranges from: 0/10  Pt describes pain as: jeffrey  Spine History: 2006 hurt back working out - was paralyzed.  R > L LBP. And denies paresthesia's   Symptoms are worse with: N/A  Symptoms are better with:  N/A  Continued use makes the pain: NE  Previous episodes: Right knee tibia fracture in 2007.   Previous treatments:  Injections over the past 1.5 years would give him 3-4 months of relief. Nerve ablation in [place] 2023 for the knee but it made it worse. Right knee injection end of march 2024 - will see MD next week.  Also taking pain meds.   Imaging: xray 2023 for R knee  -Postsurgical changes ORIF right proximal tibia with multiple intact   surgical screws.  There is mild osteoarthritis lateral compartment with   joint space narrowing and small marginal osteophytes.  Small   patellofemoral osteophytes are also noted.   Red Flags: recent/major surgery - nothing recent, night pain - no, accidents - no, unexplained weight loss - no  Medical History:     Objective Findings:  Outcome Measures:  Other Measures  Lower Extremity Funtional Score (LEFS): 61  Oswestry Disablity Index (RAF): 14/50 = 28%  Knee ROM - Nil/Min/Mod/Max limit or degrees.   Flex: R = 147, L = 146    Ext: R = 4-0, L = 7-0  MMT: hip  Flex: R = 4+/5, L = 5/5  Ext: R = 5/5, L = 5/5  ABd: R = 4+/5, L = 4+/5  IR: R = 5/5, L = 5/5  ER:  R = 5/5, L = 5/5  MMT: Knee  Flex:  R = 5/5, L = 5/5  Ext: R = 5/5, L = 5/5    Treatment:   Right Lateral 8 inch step up x 10  Squat to 10# KB /off x 10  R, L lateral lunge to 10 KB  off 8 inch step x 5 each direction  Mini squat to chest press 10 # KB x 10  R, L lateral steps with pallof press red band x 3 each direction   Educated pt on proper response to exercise, centralization/localization, produce/increase - NW principle, and assessment process.  Issued handout for HEP  HEP/med bridge:   Date: 05/03/2024  Prepared by: Reynold Marshall  Program Notes  Work on proper body mechanics and core engagement   Exercises  - Mini Squat  - 1 x daily - 7 x weekly - 2-3 sets - 10-20 reps  - Forward-Side Diagonal Fall Out Lunge   - 1 x daily - 7 x weekly - 2-3 sets - 10-20 reps  - Standing Anti-Rotation Press with Anchored  Resistance  - 1 x daily - 7 x weekly - 2-3 sets - 10-20 reps  - Standing Chest Press with Dumbbells  - 1 x daily - 7 x weekly - 2-3 sets - 10-20 reps  - Sideways Step Touch  - 1 x daily - 7 x weekly - 2-3 sets - 10-20 reps    Assessment: Pt presents to PT with complaint of Right knee pain that began in September 2021 without ARMANDO and has resolved since receiving an injection in the end of March 2024.  Pt's primary goal is to progress strengthening without pain returning, denied pain with all current activities. Pt will benefit from skilled PT to address ROM/strength/functional limitations and to prevent pain noted during evaluation today.    Plan of care:  Problem List: Pain, Functional limitations, activity limitations, ROM limitations, Posture, Gait, Transfer, Activity Limitations, IADLS/ADLS/Self care  Goals:  STG:  Pain = 0-1/10 R knee by week 6  Pt will report performance of cardiovascular exercise >/= 3 days a week >/= 10 minutes per session by week 4  Pt will deny R knee pain with HEP compliance by week 2  LTG:  R knee ROM = nil limit in all directions without ERP by week 12  5/5 B LEs with MMTing without knee pain by week 8  Pt will achieve a clinically significant improvement in LEFS by week 12  Pt will report >/= 80% improvement/progress towards PT goals by week 12  Pt will report nil R knee pain with repetitive  squatting/lifting task with >/= 30#s by week 10  Planned interventions: Ther ex, Neuromuscular re-ed, ther act, Manual, Education, Home program, Estim, Hot therapy, Cold therapy, Vaso  The POC was created, discussed, and agreed on with the patient.

## 2024-05-07 NOTE — PATIENT INSTRUCTIONS
-Wean down on Topamax by 25 mg at a time every 3 days until you are off.  -In the future, you can try Voltaren gel on the area of pain 4 times per day for 2 weeks straight and then as needed up to 4 times per day.  This is over-the-counter.    -Consider other medications in the future  -Consider knee gel injections  -Consider referral to rheumatology in the future  -Continue physical therapy and daily home exercises, dynamic active strengthening  -Consider lumbar MRI, knee MRI in the future  -[facility] paperwork filled out  -Follow-up around 6/28/2024 to decide readiness to return to work light duty

## 2024-05-08 ENCOUNTER — OFFICE VISIT (OUTPATIENT)
Dept: PHYSICAL MEDICINE AND REHAB | Facility: CLINIC | Age: 49
End: 2024-05-08
Payer: COMMERCIAL

## 2024-05-08 VITALS
BODY MASS INDEX: 34.5 KG/M2 | HEART RATE: 70 BPM | SYSTOLIC BLOOD PRESSURE: 120 MMHG | TEMPERATURE: 98.2 F | WEIGHT: 237 LBS | DIASTOLIC BLOOD PRESSURE: 82 MMHG

## 2024-05-08 DIAGNOSIS — M25.561 CHRONIC PAIN OF RIGHT KNEE: ICD-10-CM

## 2024-05-08 DIAGNOSIS — G89.29 CHRONIC PAIN OF RIGHT KNEE: ICD-10-CM

## 2024-05-08 DIAGNOSIS — M25.512 CHRONIC PAIN OF BOTH SHOULDERS: ICD-10-CM

## 2024-05-08 DIAGNOSIS — M17.31 POST-TRAUMATIC OSTEOARTHRITIS OF RIGHT KNEE: ICD-10-CM

## 2024-05-08 DIAGNOSIS — M25.511 CHRONIC PAIN OF BOTH SHOULDERS: ICD-10-CM

## 2024-05-08 DIAGNOSIS — M70.51 PES ANSERINUS BURSITIS OF RIGHT KNEE: Primary | ICD-10-CM

## 2024-05-08 DIAGNOSIS — G89.29 CHRONIC LOW BACK PAIN, UNSPECIFIED BACK PAIN LATERALITY, UNSPECIFIED WHETHER SCIATICA PRESENT: ICD-10-CM

## 2024-05-08 DIAGNOSIS — M54.2 NECK PAIN: ICD-10-CM

## 2024-05-08 DIAGNOSIS — G89.29 CHRONIC PAIN OF BOTH SHOULDERS: ICD-10-CM

## 2024-05-08 DIAGNOSIS — M54.50 CHRONIC LOW BACK PAIN, UNSPECIFIED BACK PAIN LATERALITY, UNSPECIFIED WHETHER SCIATICA PRESENT: ICD-10-CM

## 2024-05-08 DIAGNOSIS — M79.18 MYOFASCIAL PAIN SYNDROME: ICD-10-CM

## 2024-05-08 PROCEDURE — 99214 OFFICE O/P EST MOD 30 MIN: CPT | Performed by: PHYSICAL MEDICINE & REHABILITATION

## 2024-05-08 RX ORDER — TOPIRAMATE 25 MG/1
50 TABLET ORAL 2 TIMES DAILY
Qty: 120 TABLET | Refills: 0 | Status: SHIPPED | OUTPATIENT
Start: 2024-05-08 | End: 2024-06-01

## 2024-05-08 ASSESSMENT — PAIN SCALES - GENERAL: PAINLEVEL: 0-NO PAIN

## 2024-05-10 ENCOUNTER — TELEPHONE (OUTPATIENT)
Dept: PHYSICAL MEDICINE AND REHAB | Facility: CLINIC | Age: 49
End: 2024-05-10
Payer: COMMERCIAL

## 2024-05-13 ENCOUNTER — HOSPITAL ENCOUNTER (EMERGENCY)
Facility: HOSPITAL | Age: 49
Discharge: LEFT BEFORE DISCHARGE | End: 2024-05-14
Attending: EMERGENCY MEDICINE
Payer: COMMERCIAL

## 2024-05-13 DIAGNOSIS — Z13.9 ENCOUNTER FOR MEDICAL SCREENING EXAMINATION: Primary | ICD-10-CM

## 2024-05-13 PROCEDURE — 99283 EMERGENCY DEPT VISIT LOW MDM: CPT

## 2024-05-13 ASSESSMENT — LIFESTYLE VARIABLES
HAVE YOU EVER FELT YOU SHOULD CUT DOWN ON YOUR DRINKING: NO
TOTAL SCORE: 0
HAVE PEOPLE ANNOYED YOU BY CRITICIZING YOUR DRINKING: NO
EVER FELT BAD OR GUILTY ABOUT YOUR DRINKING: NO
EVER HAD A DRINK FIRST THING IN THE MORNING TO STEADY YOUR NERVES TO GET RID OF A HANGOVER: NO

## 2024-05-13 ASSESSMENT — COLUMBIA-SUICIDE SEVERITY RATING SCALE - C-SSRS
5. HAVE YOU STARTED TO WORK OUT OR WORKED OUT THE DETAILS OF HOW TO KILL YOURSELF? DO YOU INTEND TO CARRY OUT THIS PLAN?: NO
2. HAVE YOU ACTUALLY HAD ANY THOUGHTS OF KILLING YOURSELF?: YES
6. HAVE YOU EVER DONE ANYTHING, STARTED TO DO ANYTHING, OR PREPARED TO DO ANYTHING TO END YOUR LIFE?: NO
4. HAVE YOU HAD THESE THOUGHTS AND HAD SOME INTENTION OF ACTING ON THEM?: NO
1. IN THE PAST MONTH, HAVE YOU WISHED YOU WERE DEAD OR WISHED YOU COULD GO TO SLEEP AND NOT WAKE UP?: NO

## 2024-05-13 ASSESSMENT — PAIN - FUNCTIONAL ASSESSMENT: PAIN_FUNCTIONAL_ASSESSMENT: 0-10

## 2024-05-13 ASSESSMENT — PAIN SCALES - GENERAL: PAINLEVEL_OUTOF10: 0 - NO PAIN

## 2024-05-13 NOTE — LETTER
Patient: Cecil Ruiz  YOB: 1975  Date: 5/14/2024 Time: 12:28 AM    Leaving the Hospital Against Medical Advice    Chart #:[id number]    This will certify that I, the undersigned,    ______________________________________________________________________    A patient in the above named medical center, having requested discharge and removal from the medical center against the advice of my attending physician(s), hereby release [facility], its physicians, officers and employees, severally and individually, from any and all liability of any nature whatsoever for any injury or harm or complication of any kind that may result directly or indirectly, by reason of my terminating my stay as a patient at [facility] and my departure from [facility], and hereby waive any and all rights of action I may now have or later acquire as a result of my voluntary departure from [facility] and the termination of my stay as a patient therein.    This release is made with the full knowledge of the danger that may result from the action which I am taking.      Date:_______________________                         ___________________________                                                                                    Patient/Legal Representative    Witness:        ____________________________                          ___________________________  Nurse                                                                        Physician

## 2024-05-14 VITALS
SYSTOLIC BLOOD PRESSURE: 101 MMHG | TEMPERATURE: 100.2 F | HEART RATE: 129 BPM | BODY MASS INDEX: 36.05 KG/M2 | HEIGHT: 69 IN | DIASTOLIC BLOOD PRESSURE: 64 MMHG | WEIGHT: 243.39 LBS | OXYGEN SATURATION: 96 % | RESPIRATION RATE: 13 BRPM

## 2024-05-14 PROBLEM — Z13.9 ENCOUNTER FOR MEDICAL SCREENING EXAMINATION: Status: ACTIVE | Noted: 2024-05-14

## 2024-05-14 NOTE — ED NOTES
Pt wishes not to have any care performed. He expressed this to Dr. Whitaker. Pt signed AMA paperwork. Pt advised that should he change his mind, he can return back to this ED or any ED at anytime.      Alirio Villa RN  05/14/24 0033

## 2024-05-14 NOTE — ED TRIAGE NOTES
"To ED from home via [facility], per EMS pt found in the bathroom by wife, foaming at the mouth, confused. EMS states the initial call was for possible overdose. The report pt was sitting upright on the couch on arrival, A&Ox2-3, \"seemed postictal at the time\". Currently he is A&Ox4.     When asked if he remembers what happened tonight, the pt states \"yes, but I do not want to talk about it.\". pt also asks \"I do not want any tests that are going to check for drugs in my system\".     Pt's heart rate is in the 140's. Pt states \"if I think about it, I can feel my heart is beating fast\". Pt denies CP, SOB, abd pain, N/V/D. Pt states he would like to go home.       "

## 2024-05-14 NOTE — ED PROVIDER NOTES
HPI   Chief Complaint   Patient presents with    Altered Mental Status       HPI  49 male presents with questionable seizure.  Patient was at home had some seizure-like activity his daughter called EMS he was transported facility.  Patient remembers the incident.  He has no other complaints                  [place] Coma Scale Score: 15                     Patient History   Past Medical History:   Diagnosis Date    Personal history of other diseases of the digestive system     History of diverticulitis of colon     Past Surgical History:   Procedure Laterality Date    KNEE SURGERY  04/21/2017    Knee Surgery    OTHER SURGICAL HISTORY  10/20/2021    Inguinal hernia repair    OTHER SURGICAL HISTORY  10/20/2021    Umbilical hernia repair     Family History   Problem Relation Name Age of Onset    Obesity Mother      Pancreatic cancer Mother      Atrial fibrillation Father      Sleep apnea Father      Asthma Sister       Social History     Tobacco Use    Smoking status: Former     Types: Cigarettes    Smokeless tobacco: Not on file   Substance Use Topics    Alcohol use: Never    Drug use: Not on file       Physical Exam   ED Triage Vitals [05/13/24 2326]   Temperature Heart Rate Respirations BP   37.9 °C (100.2 °F) (!) 148 (!) 25 (!) 138/91      Pulse Ox Temp Source Heart Rate Source Patient Position   (!) 93 % Oral Monitor --      BP Location FiO2 (%)     -- --       Physical Exam  General:  Awake, alert, no acute distress.  Head: Normocephalic, Atraumatic  Neck: Supple, trachea midline, no stridor  Skin: Warm and dry, no rashes   Lungs:  No acute respiratory distress, speaking in full sentences without difficulty  Neuro:  No gross focal neurologic deficits, NIH is 0  Musculoskeletal:  Full range of motion in all 4 extremities  Psychiatric:  Alert oriented x 3, Good insight into condition.  ED Course & MDM   ED Course as of 05/14/24 0121   Mon May 13, 2024   4178 My EKG interpretation:  Sinus tachycardia 124 bpm normal  axis  QTc 396 no ectopy or acute ischemic changes noted [KW]      ED Course User Index  [KW] Han Whitaker DO         Diagnoses as of 05/14/24 0121   Encounter for medical screening examination       Medical Decision Making  Arrived by EMS.  Upon arrival he states he does not want to be seen.  He states he knows what precipitated this event but does not want to talk about it.  The patient is alert and oriented x 3.  At this point I feel he is able to make this decision.  He is not suicidal homicidal.  He is slightly tachycardic.  Patient signed out AGAINST MEDICAL ADVICE.  I did advise him to follow-up with his primary care doctor and if he decides that he would like medical care he is welcome to return to the emergency department.    Procedure  Procedures     Han Whitaker DO  05/14/24 0121

## 2024-05-21 ENCOUNTER — APPOINTMENT (OUTPATIENT)
Dept: PHYSICAL THERAPY | Facility: CLINIC | Age: 49
End: 2024-05-21
Payer: COMMERCIAL

## 2024-05-28 ENCOUNTER — TELEMEDICINE (OUTPATIENT)
Dept: BEHAVIORAL HEALTH | Facility: CLINIC | Age: 49
End: 2024-05-28
Payer: COMMERCIAL

## 2024-05-28 DIAGNOSIS — F31.32 BIPOLAR AFFECTIVE DISORDER, CURRENTLY DEPRESSED, MODERATE (MULTI): ICD-10-CM

## 2024-05-28 DIAGNOSIS — F41.8 ANXIETY WITH DEPRESSION: ICD-10-CM

## 2024-05-28 DIAGNOSIS — F43.10 PTSD (POST-TRAUMATIC STRESS DISORDER): ICD-10-CM

## 2024-05-28 PROCEDURE — 99214 OFFICE O/P EST MOD 30 MIN: CPT | Performed by: PSYCHIATRY & NEUROLOGY

## 2024-05-28 NOTE — PROGRESS NOTES
Subjective   Patient ID: Cecil Ruiz is a 49 y.o. male.    HPI  Diagnoses of Bipolar affective disorder, currently depressed, moderate (Multi), PTSD (post-traumatic stress disorder), and Anxiety with depression were pertinent to this visit.  Pt reports racing thoughts, excessive mood fluctuations toward hypomanic spectrum;  others have noticed this also per patient report;  Denies etoh or other substance use;  Review of Systems   Psychiatric/Behavioral:  Positive for agitation and sleep disturbance.        Objective   Physical Exam  Psychiatric:         Mood and Affect: Affect is labile.         Behavior: Behavior is agitated.         Thought Content: Thought content is not paranoid or delusional. Thought content does not include homicidal or suicidal plan.     Elevated mood    Assessment/Plan   5/28/24:  Abilify increase to 15 mg for hypomanic symptoms;  Continue other medications;  Pt declines therapy referral;  +psychosocial stressors-- letter provided for additional support with housing stressor;  Current Outpatient Medications   Medication Instructions    ammonium lactate (Lac-Hydrin) 12 % lotion APPLY AND RUB IN A THIN LAYER TO AFFECTED AREA TWICE DAILY    ARIPiprazole (ABILIFY) 15 mg, oral, Daily    multivit with minerals/lutein (MULTIVITAMIN 50 PLUS ORAL) 1 tablet, oral, Daily    prazosin (MINIPRESS) 1 mg, oral, Nightly    QUEtiapine (SeroqueL) 25 mg tablet Take 1 tab at bedtime as needed    rosuvastatin (Crestor) 10 mg tablet 1 tablet, oral, Nightly    venlafaxine XR (EFFEXOR XR) 150 mg, oral, Daily, Do not crush or chew.       Diagnoses and all orders for this visit:  Bipolar affective disorder, currently depressed, moderate (Multi)  -     Follow Up In Psychiatry; Future  PTSD (post-traumatic stress disorder)  -     prazosin (Minipress) 1 mg capsule; Take 1 capsule (1 mg) by mouth once daily at bedtime.  -     QUEtiapine (SeroqueL) 25 mg tablet; Take 1 tab at bedtime as needed  Anxiety with  depression  -     ARIPiprazole (Abilify) 15 mg tablet; Take 1 tablet (15 mg) by mouth once daily.  -     venlafaxine XR (Effexor XR) 150 mg 24 hr capsule; Take 1 capsule (150 mg) by mouth once daily. Do not crush or chew.

## 2024-05-28 NOTE — LETTER
5/28/24    To Whom It May Concern;    Mr. Cecil Ruiz is under my medical care.   Please take into consideration that any eviction or major stressor such as losing housing at this time, would severely affect my patient's current mental status and potentially worsen Mr. Ruiz's psychological disorders.     Please allow Mr. Ruiz the accommodation of extending his deadline to pay his rent and allow extended time for him to continue to live in his current housing situation before proceeding with any legal eviction proceedings.   I defer to Mr. Ruiz for the exact amount of time that he needs, but simply ask for a reasonable amount of time that will help him to avoid further deterioration of his already tenuous medical and psychosocial circumstances.     Sincerely,    Naomie Jang MD  Department of Psychiatry  [place] [address]  Phone: [phone]  Fax: [phone]

## 2024-06-01 RX ORDER — ARIPIPRAZOLE 15 MG/1
15 TABLET ORAL DAILY
Qty: 30 TABLET | Refills: 2 | Status: SHIPPED | OUTPATIENT
Start: 2024-06-01

## 2024-06-01 RX ORDER — QUETIAPINE FUMARATE 25 MG/1
TABLET, FILM COATED ORAL
Qty: 30 TABLET | Refills: 2 | Status: SHIPPED | OUTPATIENT
Start: 2024-06-01

## 2024-06-01 RX ORDER — VENLAFAXINE HYDROCHLORIDE 150 MG/1
150 CAPSULE, EXTENDED RELEASE ORAL DAILY
Qty: 30 CAPSULE | Refills: 2 | Status: SHIPPED | OUTPATIENT
Start: 2024-06-01

## 2024-06-01 RX ORDER — PRAZOSIN HYDROCHLORIDE 1 MG/1
1 CAPSULE ORAL NIGHTLY
Qty: 30 CAPSULE | Refills: 2 | Status: SHIPPED | OUTPATIENT
Start: 2024-06-01

## 2024-06-01 ASSESSMENT — ENCOUNTER SYMPTOMS
AGITATION: 1
SLEEP DISTURBANCE: 1

## 2024-06-04 ENCOUNTER — TREATMENT (OUTPATIENT)
Dept: PHYSICAL THERAPY | Facility: CLINIC | Age: 49
End: 2024-06-04
Payer: COMMERCIAL

## 2024-06-04 ENCOUNTER — HOSPITAL ENCOUNTER (OUTPATIENT)
Dept: CARDIOLOGY | Facility: CLINIC | Age: 49
Discharge: HOME | End: 2024-06-04
Payer: COMMERCIAL

## 2024-06-04 ENCOUNTER — OFFICE VISIT (OUTPATIENT)
Dept: PRIMARY CARE | Facility: CLINIC | Age: 49
End: 2024-06-04
Payer: COMMERCIAL

## 2024-06-04 VITALS
HEIGHT: 69 IN | DIASTOLIC BLOOD PRESSURE: 72 MMHG | SYSTOLIC BLOOD PRESSURE: 128 MMHG | WEIGHT: 241 LBS | BODY MASS INDEX: 35.7 KG/M2

## 2024-06-04 DIAGNOSIS — R00.2 PALPITATIONS: Primary | ICD-10-CM

## 2024-06-04 DIAGNOSIS — M25.561 CHRONIC PAIN OF RIGHT KNEE: ICD-10-CM

## 2024-06-04 DIAGNOSIS — Z13.220 LIPID SCREENING: ICD-10-CM

## 2024-06-04 DIAGNOSIS — G89.29 CHRONIC LOW BACK PAIN, UNSPECIFIED BACK PAIN LATERALITY, UNSPECIFIED WHETHER SCIATICA PRESENT: ICD-10-CM

## 2024-06-04 DIAGNOSIS — R53.83 OTHER FATIGUE: ICD-10-CM

## 2024-06-04 DIAGNOSIS — M54.50 CHRONIC LOW BACK PAIN, UNSPECIFIED BACK PAIN LATERALITY, UNSPECIFIED WHETHER SCIATICA PRESENT: ICD-10-CM

## 2024-06-04 DIAGNOSIS — M70.51 PES ANSERINUS BURSITIS OF RIGHT KNEE: ICD-10-CM

## 2024-06-04 DIAGNOSIS — G89.29 CHRONIC PAIN OF RIGHT KNEE: ICD-10-CM

## 2024-06-04 DIAGNOSIS — E78.6 HYPOCHOLESTEROLEMIA: ICD-10-CM

## 2024-06-04 DIAGNOSIS — R42 DIZZINESS: ICD-10-CM

## 2024-06-04 DIAGNOSIS — I10 HYPERTENSION, UNSPECIFIED TYPE: ICD-10-CM

## 2024-06-04 LAB — BODY SURFACE AREA: 2.31 M2

## 2024-06-04 PROCEDURE — 3074F SYST BP LT 130 MM HG: CPT | Performed by: INTERNAL MEDICINE

## 2024-06-04 PROCEDURE — 93246 EXT ECG>7D<15D RECORDING: CPT

## 2024-06-04 PROCEDURE — 3078F DIAST BP <80 MM HG: CPT | Performed by: INTERNAL MEDICINE

## 2024-06-04 PROCEDURE — 93000 ELECTROCARDIOGRAM COMPLETE: CPT | Performed by: INTERNAL MEDICINE

## 2024-06-04 PROCEDURE — 99214 OFFICE O/P EST MOD 30 MIN: CPT | Performed by: INTERNAL MEDICINE

## 2024-06-04 ASSESSMENT — ENCOUNTER SYMPTOMS
DEPRESSION: 0
LOSS OF SENSATION IN FEET: 0
OCCASIONAL FEELINGS OF UNSTEADINESS: 0

## 2024-06-04 NOTE — PROGRESS NOTES
Physical Therapy  Physical Therapy Treatment Note    Patient Name: Cecil Ruiz  MRN: [id number]  Today's Date: 6/4/2024  Time Calculation  Start Time: 0945  Stop Time: 1003  Time Calculation (min): 18 min    Insurance:  Number of Treatments Authorized: 2/60 ($30.00 CO PAY, 100% COVERAGE, 60V PT/OT, [employer], [employer])        Current Problem  1. Pes anserinus bursitis of right knee  Follow Up In Physical Therapy      2. Chronic low back pain, unspecified back pain laterality, unspecified whether sciatica present  Follow Up In Physical Therapy      3. Chronic pain of right knee  Follow Up In Physical Therapy          Precautions  Precautions  Precautions Comment: No Fall risk    Subjective   General       Patient reports:  Feeling a bit off today, he thinks he may have an issue with A-fib. Going to see Dr. Forrester after PT today. Was a little light headed/dizzy earlier today.  Would still like to try to do PT today. He has had these symptoms in the past ut usually they will resolve after 1-2 days.  This has been going on since Thursday. Knee is still feeling really good.  Not getting the exercises in as much as he would like.  Trying to get more motivated to be in shape and to be able to return to work.     Performing HEP?: Partially    Pain   0/10  R knee    Objective     Treatments:  Therex:   Scifit L 3 multipeaks x 6 min   HEP review:  R, L Lateral 8 inch step up x  12   Seated rest break - dizziness improved   Squat to 10# KB /off 8 inch step x 10 - some right knee pain, NW  after  Seated rest break - pt felt lightheaded and requested to stop the session and go to his appointment with Dr. Forrester early.     OP EDUCATION:       Assessment:  Pt had good technique with reviewed HEP but session was terminated due to pt feeling lightheaded/dizzy.  After a rest break and lightheadedness/dizziness subsided the pt was walked downstairs to his doctors office.         Plan:  Resume HEP once  cleared by his doctor.  Return to PT in 2-4 weeks.  OP PT Plan  Number of Treatments Authorized: 2/60 ($30.00 CO PAY, 100% COVERAGE, 60V PT/OT, [employer] SECONDARY)      Reynold Marshall, PT

## 2024-06-05 NOTE — PROGRESS NOTES
"Subjective   Patient ID: Cecil Ruiz is a 49 y.o. male who presents for multiple medical issues.    Cecil Ruiz today came here for multiple medical issues.  He is getting palpitation, not feeling good ______, when he changes the posture, he thinks his heart rate is fast, dizzy, but not true vertigo, on and off going on for the last four days.  Sometimes heart racing.  Feeling weak, tired, fatigued, and exhausted.    I have personally reviewed the patient's Past Medical History, Medications, Allergies, Social History, and Family History in the EMR.    CURRENT MEDICATIONS:  List reviewed, which includes abilify, BuSpar.    Review of Systems   All other systems reviewed and are negative.  The patient does not recall any stress test or any heart problem.     Objective   /72   Ht 1.753 m (5' 9\")   Wt 109 kg (241 lb)   BMI 35.59 kg/m²     Physical Exam  Vitals reviewed.   HENT:      Right Ear: Tympanic membrane, ear canal and external ear normal.      Left Ear: Tympanic membrane, ear canal and external ear normal.   Eyes:      General: No scleral icterus.     Pupils: Pupils are equal, round, and reactive to light.   Neck:      Vascular: No carotid bruit.   Cardiovascular:      Heart sounds: Normal heart sounds, S1 normal and S2 normal. No murmur heard.     No friction rub.   Pulmonary:      Effort: Pulmonary effort is normal.      Breath sounds: Normal breath sounds and air entry.   Abdominal:      Palpations: There is no hepatomegaly, splenomegaly or mass.   Musculoskeletal:         General: No swelling or deformity. Normal range of motion.      Cervical back: Neck supple.      Right lower leg: No edema.      Left lower leg: No edema.   Lymphadenopathy:      Cervical: No cervical adenopathy.      Upper Body:      Right upper body: No axillary adenopathy.      Left upper body: No axillary adenopathy.      Lower Body: No right inguinal adenopathy. No left inguinal adenopathy.   Neurological:      Mental " Status: He is oriented to person, place, and time.      Cranial Nerves: Cranial nerves 2-12 are intact. No cranial nerve deficit.      Sensory: No sensory deficit.      Motor: Motor function is intact. No weakness.      Gait: Gait is intact.      Deep Tendon Reflexes: Reflexes normal.   Psychiatric:         Mood and Affect: Mood normal. Mood is not anxious or depressed. Affect is not angry.         Behavior: Behavior is not agitated.         Thought Content: Thought content normal.         Judgment: Judgment normal.     LAB WORK:  Laboratory testing discussed.  EKG done.    Assessment/Plan   Problem List Items Addressed This Visit             ICD-10-CM       Cardiac and Vasculature    Hypocholesterolemia E78.6    Relevant Orders    Comprehensive Metabolic Panel    Lipid Panel       Symptoms and Signs    Dizziness R42    Relevant Orders    Transthoracic Echo Complete    Holter or Event Cardiac Monitor    ECG 12 lead (Clinic Performed)     Other Visit Diagnoses         Codes    Palpitations    -  Primary R00.2    Hypertension, unspecified type     I10    Relevant Orders    CBC    Urinalysis with Reflex Microscopic    Thyroid Stimulating Hormone    Lipid screening     Z13.220    Relevant Orders    Comprehensive Metabolic Panel    Lipid Panel    Other fatigue     R53.83    Relevant Orders    CBC    Urinalysis with Reflex Microscopic    Thyroid Stimulating Hormone    Triiodothyronine, Free    Thyroxine, Free        1. Palpitations, chest pain, dizziness, and lightheadedness.  It could be cardiac.  I ordered echocardiogram and Holter monitor.  2. Dizziness, near syncope.  MRI test ordered.  3. Tired, fatigued, and exhausted.  Blood work ordered.  4. Palpitations.  I ordered thyroid full profile.  5. Currently the patient is not working, but considering this condition, I have advised him to avoid strenuous activities.  I ordered cardiac test.  I shall see him in a week after test.    Scribe Attestation  By signing my name  below, I, Naseem Colon attest that this documentation has been prepared under the direction and in the presence of Aldo Forrester MD.

## 2024-06-06 ENCOUNTER — LAB (OUTPATIENT)
Dept: LAB | Facility: LAB | Age: 49
End: 2024-06-06
Payer: COMMERCIAL

## 2024-06-06 DIAGNOSIS — E78.6 HYPOCHOLESTEROLEMIA: ICD-10-CM

## 2024-06-06 DIAGNOSIS — Z13.220 LIPID SCREENING: ICD-10-CM

## 2024-06-06 DIAGNOSIS — I10 HYPERTENSION, UNSPECIFIED TYPE: ICD-10-CM

## 2024-06-06 DIAGNOSIS — R53.83 OTHER FATIGUE: ICD-10-CM

## 2024-06-06 LAB
ALBUMIN SERPL BCP-MCNC: 4.6 G/DL (ref 3.4–5)
ALP SERPL-CCNC: 58 U/L (ref 33–120)
ALT SERPL W P-5'-P-CCNC: 26 U/L (ref 10–52)
ANION GAP SERPL CALC-SCNC: 14 MMOL/L (ref 10–20)
AST SERPL W P-5'-P-CCNC: 20 U/L (ref 9–39)
BILIRUB SERPL-MCNC: 0.8 MG/DL (ref 0–1.2)
BUN SERPL-MCNC: 14 MG/DL (ref 6–23)
CALCIUM SERPL-MCNC: 9.9 MG/DL (ref 8.6–10.6)
CHLORIDE SERPL-SCNC: 103 MMOL/L (ref 98–107)
CHOLEST SERPL-MCNC: 228 MG/DL (ref 0–199)
CHOLESTEROL/HDL RATIO: 4.8
CO2 SERPL-SCNC: 29 MMOL/L (ref 21–32)
CREAT SERPL-MCNC: 1 MG/DL (ref 0.5–1.3)
EGFRCR SERPLBLD CKD-EPI 2021: >90 ML/MIN/1.73M*2
ERYTHROCYTE [DISTWIDTH] IN BLOOD BY AUTOMATED COUNT: 12.9 % (ref 11.5–14.5)
GLUCOSE SERPL-MCNC: 92 MG/DL (ref 74–99)
HCT VFR BLD AUTO: 52.7 % (ref 41–52)
HDLC SERPL-MCNC: 47.9 MG/DL
HGB BLD-MCNC: 17.2 G/DL (ref 13.5–17.5)
LDLC SERPL CALC-MCNC: 143 MG/DL
MCH RBC QN AUTO: 30.2 PG (ref 26–34)
MCHC RBC AUTO-ENTMCNC: 32.6 G/DL (ref 32–36)
MCV RBC AUTO: 93 FL (ref 80–100)
NON HDL CHOLESTEROL: 180 MG/DL (ref 0–149)
NRBC BLD-RTO: 0 /100 WBCS (ref 0–0)
PLATELET # BLD AUTO: 292 X10*3/UL (ref 150–450)
POTASSIUM SERPL-SCNC: 3.9 MMOL/L (ref 3.5–5.3)
PROT SERPL-MCNC: 7.1 G/DL (ref 6.4–8.2)
RBC # BLD AUTO: 5.7 X10*6/UL (ref 4.5–5.9)
SODIUM SERPL-SCNC: 142 MMOL/L (ref 136–145)
T3FREE SERPL-MCNC: 2.8 PG/ML (ref 2.3–4.2)
T4 FREE SERPL-MCNC: 1.2 NG/DL (ref 0.78–1.48)
TRIGL SERPL-MCNC: 184 MG/DL (ref 0–149)
TSH SERPL-ACNC: 1.56 MIU/L (ref 0.44–3.98)
VLDL: 37 MG/DL (ref 0–40)
WBC # BLD AUTO: 7.6 X10*3/UL (ref 4.4–11.3)

## 2024-06-06 PROCEDURE — 84439 ASSAY OF FREE THYROXINE: CPT

## 2024-06-06 PROCEDURE — 84443 ASSAY THYROID STIM HORMONE: CPT

## 2024-06-06 PROCEDURE — 80061 LIPID PANEL: CPT

## 2024-06-06 PROCEDURE — 84481 FREE ASSAY (FT-3): CPT

## 2024-06-06 PROCEDURE — 85027 COMPLETE CBC AUTOMATED: CPT

## 2024-06-06 PROCEDURE — 36415 COLL VENOUS BLD VENIPUNCTURE: CPT

## 2024-06-06 PROCEDURE — 80053 COMPREHEN METABOLIC PANEL: CPT

## 2024-06-20 ENCOUNTER — HOSPITAL ENCOUNTER (OUTPATIENT)
Dept: CARDIOLOGY | Facility: CLINIC | Age: 49
Discharge: HOME | End: 2024-06-20
Payer: COMMERCIAL

## 2024-06-20 DIAGNOSIS — R42 DIZZINESS: ICD-10-CM

## 2024-06-20 LAB — BODY SURFACE AREA: 2.31 M2

## 2024-06-20 PROCEDURE — 93306 TTE W/DOPPLER COMPLETE: CPT

## 2024-06-20 PROCEDURE — 93306 TTE W/DOPPLER COMPLETE: CPT | Performed by: INTERNAL MEDICINE

## 2024-06-21 LAB
AORTIC VALVE MEAN GRADIENT: 3.5 MMHG
AORTIC VALVE PEAK VELOCITY: 1.5 M/S
AV PEAK GRADIENT: 8.9 MMHG
AVA (PEAK VEL): 2.42 CM2
AVA (VTI): 2.95 CM2
EJECTION FRACTION APICAL 4 CHAMBER: 54.7
EJECTION FRACTION: 58 %
LEFT ATRIUM VOLUME AREA LENGTH INDEX BSA: 23.8 ML/M2
LEFT VENTRICLE INTERNAL DIMENSION DIASTOLE: 5.53 CM (ref 3.5–6)
LEFT VENTRICULAR OUTFLOW TRACT DIAMETER: 1.96 CM
MITRAL VALVE E/A RATIO: 0.85
RIGHT VENTRICLE FREE WALL PEAK S': 11 CM/S
TRICUSPID ANNULAR PLANE SYSTOLIC EXCURSION: 2.4 CM

## 2024-06-25 ENCOUNTER — APPOINTMENT (OUTPATIENT)
Dept: PHYSICAL THERAPY | Facility: CLINIC | Age: 49
End: 2024-06-25
Payer: COMMERCIAL

## 2024-06-25 NOTE — PATIENT INSTRUCTIONS
-try Voltaren gel on the area of pain 4 times per day for 2 weeks straight and then as needed up to 4 times per day.  This is over-the-counter.    -Consider other medications in the future  -Consider knee gel injections  -Consider referral to rheumatology in the future  -Continue physical therapy and daily home exercises, dynamic active strengthening  -Consider lumbar MRI, knee MRI in the future  -[place] paperwork filled out  -Follow-up in the 3 months

## 2024-06-25 NOTE — PROGRESS NOTES
Chief complaint:  right knee pain. Follow up    This s a pleasant 49-year-old right-handed man with past medical history of HTN, HLD, anxiety/depression, right tibial fracture, and chronic knee pain who presents for follow up of  right knee pain.    He was last seen here on 5/8/2024, at which point he was doing well following a right ultrasound-guided pes anserinus bursitis injection back in March 2024.    I gave him instructions on how to wean down on Topamax advised him to continue Voltaren gel as needed. He is off topamax.     Advised to continue physical therapy and home exercises.        He rates his pain as a 0/10, previously 0/10    Otherwise, there have been no changes to medications or past medical history since the last visit.      __________________________  3/19/24: Right ultrasound-guided pes anserinus bursitis injection, try Voltaren gel, consider other medication injections, FMLA paperwork filled out again, proceed with physical therapy and daily home exercises  5/8/2024: Wean off Topamax as tolerated, continue PT and home exercises, FMLA paperwork filled out again   6/26/2024: Patellofemoral exercises provided, try Voltaren gel, consider other medications and injections, FMLA paperwork filled out  _________________________  As a reminder:    TIMELINE OF COMPLAINT(S):  Right knee arthritis and chronic knee pain after a traumatic tibial fracture in 2007 and surgical repair. Patient states knee started hurting near hardware 2 years post surgery. Meniscus torn September 2021, Dr. Saavedra performed laparoscopic meniscus repair. He has not been able to work since 2021.  He is s/p genicular RFA in September 2023 which did not help. He says he is ready for a knee replacement but 3-4 orthopedic surgeons say he is not ready due to age and radiographs. He has history of cocaine use, sober since 7/2023. He states he was started on topiramate 3 months ago and developed a rash on his face 1 month ago.  Patient also  "reports low back pain that is long standing but would like to focus on his knee pain at today's visit.    -He has some occasional urge bowel incontinence, denies any urine ncontinence or saddle anesthesia.    Pain:  LOCATION- medial right knee pointing to the pes anserinus area  RADIATION- no  ASSOCIATED WITH- Buckling  NUMBNESS/TINGLING- No  WEAKNESS- No  CONSTANT or INTERMITTENT- Constant  SEVERITY/QUANTITY- 3  QUALITY- Achy, burning along scars  EXACERBATED BY- Stairs, inclines, squats  BETTER WITH- Ice  TRIED- Ibuprofen, Topiramate      Anti-Inflammatories: ketorolac (didn't help), prednisone, ibuprofen, meloxicam, never tried Voltaren gel      Muscle relaxants: clonazepam (for panic), cyclobenzaprine, (doesn't remember).      Anti-depressants: multiple over years, no SNRI or TCA, currently on Abilify, Lexapro, and Buspar      Neuroleptics: Topiramate helped significantly, gabapentin (300mg TID),       LDN:    PHYSICAL THERAPY: Yes, 1-2yrs ago.  No home exercises  TENS unit: No  CHIROPRACTIC MANIPULATION: No  ACUPUNCTURE TREATMENTS: No  DEEP TISSUE MASSAGE THERAPY: No  OSTEOPATHIC MANIPULATION THERAPY: No  INJECTIONS: Previous cortisone and \"cortisone like injection\" did not help.  He does not think he had any hyaluronic acid injections, genicular nerve block 8/2023 (seemed to help), RFA 9/2023 (didn't help)  EMG/NCS: No    IMAGING: Yes      === 05/04/23 ===    XR KNEE COMPLETE 4 OR MORE VIEWS  Surgical plate and numerous surgical screws are again seen throughout  the proximal right tibia. Redemonstrated plateau fracture is  unchanged in appearance compared to prior imaging. No new displaced  fractures are evident. Normal bone mineralization. No sizable  suprapatellar joint effusion of the right knee.  - Impression -  Postsurgical changes of the proximal right tibia without significant  change compared to prior imaging.    XR R Knee 5/31/23  Postsurgical changes ORIF right proximal tibia with multiple intact " "  surgical screws.  There is mild osteoarthritis lateral compartment with   joint space narrowing and small marginal osteophytes.  Small   patellofemoral osteophytes are     FUNCTIONAL HISTORY: The patient is independent in all ADLs, mobility, and driving. The patient does use a cane for long distances only.    SH:  Lives in: [place]   Lives with: Daughter  Occupation: [employer]  Tobacco: Former, quit 2010. One pack daily  Alcohol: \"lots\"  Drugs: Former, quit cocaine in July 2023    ____________________________  ROS: The patient denies any bowel or bladder incontinence/accidents, night sweats, fevers, chills, recent significant weight loss. A 14 point review of systems was reviewed with the patient and is as above and otherwise negative.  ROS questionnaire positive for fatigue, depression, anxiety,    PHYSICAL EXAM    GEN - Alert, well-developed, well-nourished, no acute distress  PSYCH - Cooperative, appropriate mood and affect  HEENT - NC/AT, erythematous rash to bridge of nose, legs.  RESP - Non-labored respirations, equal expansion  CV - warm and well-perfused, No cyanosis or edema in extremities.   BD- soft, ND, overweight  SKIN -no rash    Previous:    Right knee: 15cm surgical scar diagonally medial knee and 4cm vertical scar laterally, both tender to palpation. No effusion, or erythema, some warmth noted, multiple small scabs on knee, one bleeding.  No popliteal fullness.  No anterior, posterior, medial or lateral instability.  Significant tenderness over the right pes anserine reminiscent of his pain..  There is no significant pain with hyperflexion of the knee.  There is no pain with varus or valgus stressing of the knee during flexion and extension.  There is no crepitus.  There is medial joint line tenderness. Patellar grind negative.  Left knee: normal exam.    NEURO -   LE strength 5/5 -  including hip flexors, knee flexors, knee extensors, ankle dorsiflexors, ankle plantar flexors, and EHL   Sensation - " intact to light touch in bilateral lower extremities except for decreased to light touch right anterior knee both medial and lateral.  Reflexes - 3+ patellar and 2+ Achilles reflexes bilaterally.  1 or 2 nonsustained beats of clonus bilaterally, normal reflexes in the upper extremities, negative Estefania's, No Babinski.  GAIT - Normal base, normal stride length, antalgic.  Slow able to toe walk and heel walk without difficulty but with pain. Able to perform tandem gait without difficulty but painful.         IMPRESSION:    This  is a pleasant 49-year-old right-handed man with past medical history of HTN, anxiety/depression, right tibial fracture, and chronic knee pain who presents for follow-up of right knee pain.  Physical exam is reassuring for lack of neurologic compromise, there is some hyperreflexia in bilateral lower extremities.  Symptoms of physical exam findings consistent with pes anserinus bursitis rather than internal knee derangement.      -try Voltaren gel on the area of pain 4 times per day for 2 weeks straight and then as needed up to 4 times per day.  This is over-the-counter.    -Consider other medications in the future  -Consider knee gel injections  -Consider referral to rheumatology in the future  -Continue physical therapy and daily home exercises, dynamic active strengthening  -Consider lumbar MRI, knee MRI in the future  -[facility] paperwork filled out  -Follow-up in the 3 months  '       The patient expressed understanding and agreement with the assessment and plan. Patient encouraged to contact us should they have any questions, concerns, or any changes in symptoms.      Thank you for allowing me to participate in the care of your patient.       ** Dictated with voice recognition software, please forgive any errors in grammar and/or spelling **

## 2024-06-26 ENCOUNTER — APPOINTMENT (OUTPATIENT)
Dept: PHYSICAL MEDICINE AND REHAB | Facility: CLINIC | Age: 49
End: 2024-06-26
Payer: COMMERCIAL

## 2024-06-26 VITALS
BODY MASS INDEX: 36.43 KG/M2 | HEART RATE: 80 BPM | SYSTOLIC BLOOD PRESSURE: 126 MMHG | RESPIRATION RATE: 18 BRPM | DIASTOLIC BLOOD PRESSURE: 81 MMHG | TEMPERATURE: 98 F | HEIGHT: 69 IN | WEIGHT: 246 LBS

## 2024-06-26 DIAGNOSIS — G89.29 CHRONIC PAIN OF BOTH SHOULDERS: ICD-10-CM

## 2024-06-26 DIAGNOSIS — M25.561 CHRONIC PAIN OF RIGHT KNEE: Primary | ICD-10-CM

## 2024-06-26 DIAGNOSIS — G89.29 CHRONIC LOW BACK PAIN, UNSPECIFIED BACK PAIN LATERALITY, UNSPECIFIED WHETHER SCIATICA PRESENT: ICD-10-CM

## 2024-06-26 DIAGNOSIS — M70.51 PES ANSERINUS BURSITIS OF RIGHT KNEE: ICD-10-CM

## 2024-06-26 DIAGNOSIS — M25.511 CHRONIC PAIN OF BOTH SHOULDERS: ICD-10-CM

## 2024-06-26 DIAGNOSIS — M17.31 POST-TRAUMATIC OSTEOARTHRITIS OF RIGHT KNEE: ICD-10-CM

## 2024-06-26 DIAGNOSIS — M54.50 CHRONIC LOW BACK PAIN, UNSPECIFIED BACK PAIN LATERALITY, UNSPECIFIED WHETHER SCIATICA PRESENT: ICD-10-CM

## 2024-06-26 DIAGNOSIS — M79.18 MYOFASCIAL PAIN SYNDROME: ICD-10-CM

## 2024-06-26 DIAGNOSIS — G89.29 CHRONIC PAIN OF RIGHT KNEE: Primary | ICD-10-CM

## 2024-06-26 DIAGNOSIS — M25.512 CHRONIC PAIN OF BOTH SHOULDERS: ICD-10-CM

## 2024-06-26 DIAGNOSIS — M54.2 NECK PAIN: ICD-10-CM

## 2024-06-26 PROCEDURE — 99214 OFFICE O/P EST MOD 30 MIN: CPT | Performed by: PHYSICAL MEDICINE & REHABILITATION

## 2024-06-26 ASSESSMENT — PAIN SCALES - GENERAL: PAINLEVEL: 0-NO PAIN

## 2024-07-01 ENCOUNTER — TELEPHONE (OUTPATIENT)
Dept: PHYSICAL MEDICINE AND REHAB | Facility: CLINIC | Age: 49
End: 2024-07-01
Payer: COMMERCIAL

## 2024-07-01 NOTE — TELEPHONE ENCOUNTER
Pt called  stating [employer] did not receive Physicians Statement, only RFI.  I called him back, all 5 pages were faxed to [phone] together and received on 6/27/24 at 10:38AM.  I will refax however.  Alternatively, these were also mailed to his home per his request so he can turn them in as well if this fails again somehow...

## 2024-07-11 ENCOUNTER — APPOINTMENT (OUTPATIENT)
Dept: RADIOLOGY | Facility: HOSPITAL | Age: 49
End: 2024-07-11
Payer: COMMERCIAL

## 2024-07-11 ENCOUNTER — APPOINTMENT (OUTPATIENT)
Dept: CARDIOLOGY | Facility: HOSPITAL | Age: 49
End: 2024-07-11
Payer: COMMERCIAL

## 2024-07-11 ENCOUNTER — HOSPITAL ENCOUNTER (OUTPATIENT)
Dept: CARDIOLOGY | Facility: HOSPITAL | Age: 49
Discharge: HOME | End: 2024-07-11
Payer: COMMERCIAL

## 2024-07-11 ENCOUNTER — HOSPITAL ENCOUNTER (EMERGENCY)
Facility: HOSPITAL | Age: 49
Discharge: HOME | End: 2024-07-11
Attending: EMERGENCY MEDICINE
Payer: COMMERCIAL

## 2024-07-11 VITALS
OXYGEN SATURATION: 96 % | HEART RATE: 66 BPM | SYSTOLIC BLOOD PRESSURE: 103 MMHG | DIASTOLIC BLOOD PRESSURE: 74 MMHG | BODY MASS INDEX: 35.55 KG/M2 | HEIGHT: 69 IN | TEMPERATURE: 98.1 F | RESPIRATION RATE: 12 BRPM | WEIGHT: 240 LBS

## 2024-07-11 DIAGNOSIS — R55 VASOVAGAL SYNCOPE: Primary | ICD-10-CM

## 2024-07-11 DIAGNOSIS — S01.81XA FACIAL LACERATION, INITIAL ENCOUNTER: ICD-10-CM

## 2024-07-11 LAB
ALBUMIN SERPL-MCNC: 4.3 G/DL (ref 3.5–5)
ALP BLD-CCNC: 64 U/L (ref 35–125)
ALT SERPL-CCNC: 26 U/L (ref 5–40)
ANION GAP SERPL CALC-SCNC: 12 MMOL/L
AST SERPL-CCNC: 19 U/L (ref 5–40)
BASOPHILS # BLD AUTO: 0.07 X10*3/UL (ref 0–0.1)
BASOPHILS NFR BLD AUTO: 1.2 %
BILIRUB SERPL-MCNC: 0.7 MG/DL (ref 0.1–1.2)
BUN SERPL-MCNC: 14 MG/DL (ref 8–25)
CALCIUM SERPL-MCNC: 9.2 MG/DL (ref 8.5–10.4)
CHLORIDE SERPL-SCNC: 104 MMOL/L (ref 97–107)
CO2 SERPL-SCNC: 23 MMOL/L (ref 24–31)
CREAT SERPL-MCNC: 1 MG/DL (ref 0.4–1.6)
EGFRCR SERPLBLD CKD-EPI 2021: >90 ML/MIN/1.73M*2
EOSINOPHIL # BLD AUTO: 0.17 X10*3/UL (ref 0–0.7)
EOSINOPHIL NFR BLD AUTO: 2.8 %
ERYTHROCYTE [DISTWIDTH] IN BLOOD BY AUTOMATED COUNT: 12.9 % (ref 11.5–14.5)
GLUCOSE SERPL-MCNC: 107 MG/DL (ref 65–99)
HCT VFR BLD AUTO: 48.5 % (ref 41–52)
HGB BLD-MCNC: 16.5 G/DL (ref 13.5–17.5)
IMM GRANULOCYTES # BLD AUTO: 0.01 X10*3/UL (ref 0–0.7)
IMM GRANULOCYTES NFR BLD AUTO: 0.2 % (ref 0–0.9)
LYMPHOCYTES # BLD AUTO: 1.76 X10*3/UL (ref 1.2–4.8)
LYMPHOCYTES NFR BLD AUTO: 29 %
MAGNESIUM SERPL-MCNC: 2.1 MG/DL (ref 1.6–3.1)
MCH RBC QN AUTO: 30.4 PG (ref 26–34)
MCHC RBC AUTO-ENTMCNC: 34 G/DL (ref 32–36)
MCV RBC AUTO: 90 FL (ref 80–100)
MONOCYTES # BLD AUTO: 0.34 X10*3/UL (ref 0.1–1)
MONOCYTES NFR BLD AUTO: 5.6 %
NEUTROPHILS # BLD AUTO: 3.71 X10*3/UL (ref 1.2–7.7)
NEUTROPHILS NFR BLD AUTO: 61.2 %
NRBC BLD-RTO: 0 /100 WBCS (ref 0–0)
PLATELET # BLD AUTO: 243 X10*3/UL (ref 150–450)
POTASSIUM SERPL-SCNC: 3.6 MMOL/L (ref 3.4–5.1)
PROT SERPL-MCNC: 7 G/DL (ref 5.9–7.9)
RBC # BLD AUTO: 5.42 X10*6/UL (ref 4.5–5.9)
SODIUM SERPL-SCNC: 139 MMOL/L (ref 133–145)
TROPONIN T SERPL-MCNC: 6 NG/L
TROPONIN T SERPL-MCNC: 8 NG/L
WBC # BLD AUTO: 6.1 X10*3/UL (ref 4.4–11.3)

## 2024-07-11 PROCEDURE — 84484 ASSAY OF TROPONIN QUANT: CPT | Performed by: EMERGENCY MEDICINE

## 2024-07-11 PROCEDURE — 36415 COLL VENOUS BLD VENIPUNCTURE: CPT | Performed by: EMERGENCY MEDICINE

## 2024-07-11 PROCEDURE — 93005 ELECTROCARDIOGRAM TRACING: CPT

## 2024-07-11 PROCEDURE — 83735 ASSAY OF MAGNESIUM: CPT | Performed by: EMERGENCY MEDICINE

## 2024-07-11 PROCEDURE — 76377 3D RENDER W/INTRP POSTPROCES: CPT

## 2024-07-11 PROCEDURE — 99285 EMERGENCY DEPT VISIT HI MDM: CPT | Mod: 25

## 2024-07-11 PROCEDURE — 2500000005 HC RX 250 GENERAL PHARMACY W/O HCPCS: Performed by: PHYSICIAN ASSISTANT

## 2024-07-11 PROCEDURE — 96374 THER/PROPH/DIAG INJ IV PUSH: CPT | Mod: 59

## 2024-07-11 PROCEDURE — 2500000004 HC RX 250 GENERAL PHARMACY W/ HCPCS (ALT 636 FOR OP/ED)

## 2024-07-11 PROCEDURE — 80053 COMPREHEN METABOLIC PANEL: CPT | Performed by: EMERGENCY MEDICINE

## 2024-07-11 PROCEDURE — 96375 TX/PRO/DX INJ NEW DRUG ADDON: CPT | Mod: 59

## 2024-07-11 PROCEDURE — 72125 CT NECK SPINE W/O DYE: CPT

## 2024-07-11 PROCEDURE — 70450 CT HEAD/BRAIN W/O DYE: CPT

## 2024-07-11 PROCEDURE — 70486 CT MAXILLOFACIAL W/O DYE: CPT

## 2024-07-11 PROCEDURE — 12014 RPR F/E/E/N/L/M 5.1-7.5 CM: CPT | Performed by: EMERGENCY MEDICINE

## 2024-07-11 PROCEDURE — 85025 COMPLETE CBC W/AUTO DIFF WBC: CPT | Performed by: EMERGENCY MEDICINE

## 2024-07-11 PROCEDURE — 12013 RPR F/E/E/N/L/M 2.6-5.0 CM: CPT | Performed by: PHYSICIAN ASSISTANT

## 2024-07-11 RX ORDER — KETOROLAC TROMETHAMINE 30 MG/ML
30 INJECTION, SOLUTION INTRAMUSCULAR; INTRAVENOUS ONCE
Status: COMPLETED | OUTPATIENT
Start: 2024-07-11 | End: 2024-07-11

## 2024-07-11 RX ORDER — LIDOCAINE HYDROCHLORIDE 10 MG/ML
20 INJECTION INFILTRATION; PERINEURAL ONCE
Status: COMPLETED | OUTPATIENT
Start: 2024-07-11 | End: 2024-07-11

## 2024-07-11 RX ORDER — BUPIVACAINE HYDROCHLORIDE 5 MG/ML
20 INJECTION, SOLUTION EPIDURAL; INTRACAUDAL ONCE
Status: DISCONTINUED | OUTPATIENT
Start: 2024-07-11 | End: 2024-07-11 | Stop reason: HOSPADM

## 2024-07-11 RX ORDER — ONDANSETRON HYDROCHLORIDE 2 MG/ML
4 INJECTION, SOLUTION INTRAVENOUS ONCE
Status: COMPLETED | OUTPATIENT
Start: 2024-07-11 | End: 2024-07-11

## 2024-07-11 RX ADMIN — KETOROLAC TROMETHAMINE 30 MG: 30 INJECTION, SOLUTION INTRAMUSCULAR at 18:44

## 2024-07-11 RX ADMIN — ONDANSETRON 4 MG: 2 INJECTION INTRAMUSCULAR; INTRAVENOUS at 18:44

## 2024-07-11 RX ADMIN — LIDOCAINE HYDROCHLORIDE 20 ML: 10 INJECTION, SOLUTION INFILTRATION; PERINEURAL at 16:50

## 2024-07-11 ASSESSMENT — COLUMBIA-SUICIDE SEVERITY RATING SCALE - C-SSRS
1. IN THE PAST MONTH, HAVE YOU WISHED YOU WERE DEAD OR WISHED YOU COULD GO TO SLEEP AND NOT WAKE UP?: NO
2. HAVE YOU ACTUALLY HAD ANY THOUGHTS OF KILLING YOURSELF?: NO
6. HAVE YOU EVER DONE ANYTHING, STARTED TO DO ANYTHING, OR PREPARED TO DO ANYTHING TO END YOUR LIFE?: NO

## 2024-07-11 NOTE — ED PROCEDURE NOTE
Procedure  Laceration Repair    Performed by: Alyson Li PA-C  Authorized by: Sarah Turner DO    Consent:     Consent obtained:  Verbal    Consent given by:  Patient    Risks discussed:  Pain, poor cosmetic result and poor wound healing    Alternatives discussed:  No treatment  Universal protocol:     Procedure explained and questions answered to patient or proxy's satisfaction: yes      Patient identity confirmed:  Verbally with patient  Anesthesia:     Anesthesia method:  Local infiltration    Local anesthetic:  Lidocaine 1% w/o epi  Laceration details:     Location:  Face    Face location:  R cheek    Length (cm):  3  Pre-procedure details:     Preparation:  Patient was prepped and draped in usual sterile fashion  Treatment:     Area cleansed with:  Chlorhexidine    Amount of cleaning:  Standard  Skin repair:     Repair method:  Sutures    Suture size:  4-0    Suture material:  Prolene    Number of sutures:  7  Approximation:     Approximation:  Close  Repair type:     Repair type:  Simple  Post-procedure details:     Dressing:  Open (no dressing)               Alyson Li PA-C  07/11/24 1738

## 2024-07-11 NOTE — ED PROCEDURE NOTE
Procedure  Laceration Repair    Performed by: Alyson Li PA-C  Authorized by: Sarah Turner DO    Consent:     Consent obtained:  Verbal    Consent given by:  Patient    Risks discussed:  Pain and poor cosmetic result  Universal protocol:     Patient identity confirmed:  Verbally with patient and arm band  Anesthesia:     Anesthesia method:  None  Laceration details:     Location:  Face    Face location:  R lower eyelid    Extent:  Superficial    Length (cm):  3  Treatment:     Area cleansed with:  Chlorhexidine    Amount of cleaning:  Standard    Debridement:  None  Skin repair:     Repair method:  Tissue adhesive  Approximation:     Approximation:  Close  Repair type:     Repair type:  Simple  Post-procedure details:     Dressing:  Open (no dressing)               Alyson Li PA-C  07/11/24 1733

## 2024-07-11 NOTE — ED PROVIDER NOTES
HPI   Chief Complaint   Patient presents with    Syncope       Patient is a 49-year-old male presenting to the emergency department for evaluation after syncopal episode.  Patient states [place] and when he went to stand up he had a vasovagal reaction and fell face forward.  He states he was wearing his glasses and therefore sustained a laceration to the lateral aspect of his right eye as well as the inside of his upper lip.  He is not complaining of any significant pain right now other than pain around the eye.  He denies any chest pain, shortness of breath, fever, chills, nausea, vomiting, abdominal pain, cough, congestion, headaches, numbness, tingling, changes in vision, pain with eye movement.                          No data recorded                   Patient History   Past Medical History:   Diagnosis Date    Personal history of other diseases of the digestive system     History of diverticulitis of colon     Past Surgical History:   Procedure Laterality Date    KNEE SURGERY  04/21/2017    Knee Surgery    OTHER SURGICAL HISTORY  10/20/2021    Inguinal hernia repair    OTHER SURGICAL HISTORY  10/20/2021    Umbilical hernia repair     Family History   Problem Relation Name Age of Onset    Obesity Mother      Pancreatic cancer Mother      Atrial fibrillation Father      Sleep apnea Father      Asthma Sister       Social History     Tobacco Use    Smoking status: Former     Types: Cigarettes    Smokeless tobacco: Not on file   Substance Use Topics    Alcohol use: Never    Drug use: Not on file       Physical Exam   ED Triage Vitals   Temp Pulse Resp BP   -- -- -- --      SpO2 Temp src Heart Rate Source Patient Position   -- -- -- --      BP Location FiO2 (%)     -- --       Physical Exam  Vitals and nursing note reviewed.   Constitutional:       General: He is not in acute distress.     Appearance: Normal appearance. He is not ill-appearing or toxic-appearing.   HENT:       Head: Normocephalic.        Nose: Nose normal.      Mouth/Throat:      Comments: 0.3 cm laceration noted to the inner upper lip on the right side.  This is not a through and through laceration and does not need to be fixed.  Eyes:      Extraocular Movements: Extraocular movements intact.      Pupils: Pupils are equal, round, and reactive to light.   Cardiovascular:      Rate and Rhythm: Normal rate and regular rhythm.      Pulses: Normal pulses.      Heart sounds: Normal heart sounds. No murmur heard.     No friction rub. No gallop.   Pulmonary:      Effort: Pulmonary effort is normal.      Breath sounds: Normal breath sounds. No wheezing, rhonchi or rales.   Abdominal:      Palpations: Abdomen is soft.      Tenderness: There is no abdominal tenderness.   Musculoskeletal:         General: No tenderness. Normal range of motion.      Cervical back: Normal range of motion. No tenderness.      Comments: Moving all extremities without difficulty.  No gait abnormalities.  No pain in the upper or lower extremities.   Skin:     General: Skin is warm.   Neurological:      General: No focal deficit present.      Mental Status: He is alert.      Sensory: No sensory deficit.      Motor: No weakness.      Gait: Gait normal.   Psychiatric:         Mood and Affect: Mood normal.         Behavior: Behavior normal.         ED Course & MDM   ED Course as of 07/11/24 1831   Thu Jul 11, 2024   1641 EKG personally interpreted by me performed at 1607  Normal sinus rhythm with ventricular rate 73 normal axis and intervals no acute ischemic changes [EF]      ED Course User Index  [EF] Sarah Turner DO         Diagnoses as of 07/11/24 1831   Vasovagal syncope   Facial laceration, initial encounter       Medical Decision Making  **Disclaimer parts of this chart have been completed using voice recognition software. Please excuse any errors of transcription.     Patient seen in conjunction with attending physician     HPI: Detailed  above.    Exam: A medically appropriate exam performed, outlined above, given the known history and presentation.    History obtained from: Patient    EKG: Reviewed and interpreted by my attending physician    Labs/Diagnostics:  Labs Reviewed   COMPREHENSIVE METABOLIC PANEL - Abnormal       Result Value    Glucose 107 (*)     Sodium 139      Potassium 3.6      Chloride 104      Bicarbonate 23 (*)     Urea Nitrogen 14      Creatinine 1.00      eGFR >90      Calcium 9.2      Albumin 4.3      Alkaline Phosphatase 64      Total Protein 7.0      AST 19      Bilirubin, Total 0.7      ALT 26      Anion Gap 12     MAGNESIUM - Normal    Magnesium 2.10     SERIAL TROPONIN, INITIAL ([facility]) - Normal    Troponin T, High Sensitivity 8     SERIAL TROPONIN,  2 HOUR ([facility]) - Normal    Troponin T, High Sensitivity 6     CBC WITH AUTO DIFFERENTIAL    WBC 6.1      nRBC 0.0      RBC 5.42      Hemoglobin 16.5      Hematocrit 48.5      MCV 90      MCH 30.4      MCHC 34.0      RDW 12.9      Platelets 243      Neutrophils % 61.2      Immature Granulocytes %, Automated 0.2      Lymphocytes % 29.0      Monocytes % 5.6      Eosinophils % 2.8      Basophils % 1.2      Neutrophils Absolute 3.71      Immature Granulocytes Absolute, Automated 0.01      Lymphocytes Absolute 1.76      Monocytes Absolute 0.34      Eosinophils Absolute 0.17      Basophils Absolute 0.07     TROPONIN T SERIES, HIGH SENSITIVITY (0, 2 HR, 6 HR)    Narrative:     The following orders were created for panel order Troponin T Series, High Sensitivity (0, 2HR, 6HR).  Procedure                               Abnormality         Status                     ---------                               -----------         ------                     Serial Troponin, Initial...[[id number]]  Normal              Final result               Serial Troponin, 2 Hour ...[[id number]]  Normal              Final result               Serial Troponin, 6 Hour ...[[id number]]                                                    Please view results for these tests on the individual orders.     CT cervical spine wo IV contrast   Final Result   No evidence of acute cortical infarct or intracranial hemorrhage.        No acute fracture or dislocation in the facial and cranial bones.        No acute fracture or subluxation in the cervical spine        Signed by: Chris De Los Santos 7/11/2024 5:59 PM   Dictation workstation:   [id number]      CT head wo IV contrast   Final Result   No evidence of acute cortical infarct or intracranial hemorrhage.        No acute fracture or dislocation in the facial and cranial bones.        No acute fracture or subluxation in the cervical spine        Signed by: Chirs De Los Santos 7/11/2024 5:59 PM   Dictation workstation:   [id number]      CT maxillofacial bones wo IV contrast   Final Result   No evidence of acute cortical infarct or intracranial hemorrhage.        No acute fracture or dislocation in the facial and cranial bones.        No acute fracture or subluxation in the cervical spine        Signed by: Chris De Los Santos 7/11/2024 5:59 PM   Dictation workstation:   [id number]        EMERGENCY DEPARTMENT COURSE and DIFFERENTIAL DIAGNOSIS/MDM:  Patient is a 49-year-old male presenting to the emergency department for evaluation after syncopal event.  On physical exam vital signs stable patient is in no acute distress.  He is laceration noted to the lateral aspect of the right eye with some surrounding developing ecchymosis.  Patient has no pain with eye movements.  Patient also has a small laceration to the right inner upper lip.  This is not a through and through laceration and therefore does not need to be repaired.  CT of the head and neck as well as maxillofacial bones ordered.  Diagnostic labs including troponin also ordered to rule out cardiogenic syncope.  Initial troponin 8 and repeat troponin 6.  CMP showed no electrolyte abnormalities.  Magnesium normal.  CBC showed no leukocytosis or anemia.   "CT of the cervical spine showed no fracture or subluxation.  CT of the maxillofacial bones showed no fracture or dislocation.  CT of the head showed no evidence of acute cortical infarct or intracranial hemorrhage.  Sutures were placed by Jen ASHBY. Please see her note for further information on repair.  Suspect patient's syncopal episode was likely vasovagal response after standing up from a squatted position.  Patient was discharged in stable condition.  He will follow-up with primary care physician outpatient within the next 1 to 2 days.  He will have sutures removed in 7 days.  He will return to the emergency department with any new or worsening symptoms.    Emergent pathologies were considered for this patient, although I have low suspicion for anything acutely emergent given patient's clinical presentation, history, physical exam, stable vital signs, and relatively unremarkable workup.  Discharging patient home is reasonable plan of care for outpatient management.     All labs, imaging, and diagnostic studies were reviewed by me and patient was counseled on clinical impression, expectations, and plan.  Patient was educated to follow-up with PCP in the following 1-2 days.  All questions from patient were answered. They elicited understanding and were agreeable to course of treatment.  Patient was discharged in stable condition and given strict return precautions.      Vitals:    Vitals:    07/11/24 1555 07/11/24 1630 07/11/24 1800   BP: (!) 140/96 104/69 103/74   Patient Position: Sitting Sitting Sitting   Pulse: 88 66 66   Resp: 17 15 12   Temp: 36.7 °C (98.1 °F)     TempSrc: Oral     SpO2: (!) 93% 95% 96%   Weight: 109 kg (240 lb)     Height: 1.753 m (5' 9\")       History Limited by:    None    Independent history obtained from:    None    External records reviewed:    Outpatient Note previous visit from primary care physician on 1/12/2024 to determine patient's past medical history including back pain, neck " pain, knee pain, hypertension, hyperlipidemia    Diagnostics interpreted by me:    CT Scan(s) see MDM    Discussions with other clinicians:    None    Chronic conditions impacting care:    None    Social determinants of health affecting care:    None    Diagnostic tests considered but not performed: None    ED Medications managed:    Medications   lidocaine-racepinep-tetracaine (LET) 4-0.05-0.5 % gel 3 mL (3 mL Topical Not Given 7/11/24 1555)   bupivacaine PF (Marcaine) 0.5 % (5 mg/mL) injection 100 mg (100 mg infiltration Not Given 7/11/24 1555)   lidocaine (Xylocaine) 10 mg/mL (1 %) injection 20 mL (20 mL subcutaneous Given 7/11/24 1650)       Prescription drugs considered:    None    Screenings:              Procedure  Procedures     Jane Madison PA-C  07/11/24 2664

## 2024-07-11 NOTE — ED TRIAGE NOTES
Pt brought by EMS after a syncopal episode at the bus stop. Pt states he was crouched down and when he stood up he blacked out. Laceration noted to right eye and lip.

## 2024-07-11 NOTE — Clinical Note
Cecil Ruiz was seen and treated in our emergency department on 7/11/2024.  He may return to work on 07/15/2024.       If you have any questions or concerns, please don't hesitate to call.      Jane Madison PA-C

## 2024-07-13 LAB
ATRIAL RATE: 73 BPM
P AXIS: 46 DEGREES
P OFFSET: 168 MS
P ONSET: 137 MS
PR INTERVAL: 140 MS
Q ONSET: 207 MS
QRS COUNT: 12 BEATS
QRS DURATION: 86 MS
QT INTERVAL: 370 MS
QTC CALCULATION(BAZETT): 407 MS
QTC FREDERICIA: 395 MS
R AXIS: -2 DEGREES
T AXIS: 28 DEGREES
T OFFSET: 392 MS
VENTRICULAR RATE: 73 BPM

## 2024-07-15 ENCOUNTER — APPOINTMENT (OUTPATIENT)
Dept: PRIMARY CARE | Facility: CLINIC | Age: 49
End: 2024-07-15
Payer: COMMERCIAL

## 2024-07-15 VITALS
WEIGHT: 244 LBS | SYSTOLIC BLOOD PRESSURE: 132 MMHG | HEIGHT: 69 IN | BODY MASS INDEX: 36.14 KG/M2 | DIASTOLIC BLOOD PRESSURE: 80 MMHG

## 2024-07-15 DIAGNOSIS — K21.9 GASTROESOPHAGEAL REFLUX DISEASE, UNSPECIFIED WHETHER ESOPHAGITIS PRESENT: ICD-10-CM

## 2024-07-15 DIAGNOSIS — E78.00 HIGH CHOLESTEROL: ICD-10-CM

## 2024-07-15 DIAGNOSIS — R55 SYNCOPE, UNSPECIFIED SYNCOPE TYPE: ICD-10-CM

## 2024-07-15 DIAGNOSIS — F41.9 ANXIETY AND DEPRESSION: Primary | ICD-10-CM

## 2024-07-15 DIAGNOSIS — F32.A ANXIETY AND DEPRESSION: Primary | ICD-10-CM

## 2024-07-15 PROBLEM — J30.89 ALLERGIC RHINITIS DUE TO DUST MITE: Status: RESOLVED | Noted: 2023-03-27 | Resolved: 2024-07-15

## 2024-07-15 PROBLEM — F12.10 CANNABIS USE DISORDER, MILD, ABUSE: Status: RESOLVED | Noted: 2023-04-02 | Resolved: 2024-07-15

## 2024-07-15 PROBLEM — B35.9 RINGWORM: Status: RESOLVED | Noted: 2023-03-27 | Resolved: 2024-07-15

## 2024-07-15 PROBLEM — R10.9 ABDOMINAL PAIN: Status: RESOLVED | Noted: 2023-03-27 | Resolved: 2024-07-15

## 2024-07-15 PROBLEM — R09.82 POST-NASAL DRAINAGE: Status: RESOLVED | Noted: 2023-03-27 | Resolved: 2024-07-15

## 2024-07-15 PROBLEM — J03.90 TONSILLITIS: Status: RESOLVED | Noted: 2023-03-27 | Resolved: 2024-07-15

## 2024-07-15 PROBLEM — R21 RASH: Status: RESOLVED | Noted: 2023-03-27 | Resolved: 2024-07-15

## 2024-07-15 PROBLEM — J34.2 DEVIATED SEPTUM: Status: RESOLVED | Noted: 2023-03-27 | Resolved: 2024-07-15

## 2024-07-15 PROBLEM — J45.40 MODERATE PERSISTENT ASTHMA WITHOUT COMPLICATION (HHS-HCC): Status: RESOLVED | Noted: 2023-03-27 | Resolved: 2024-07-15

## 2024-07-15 PROBLEM — M79.603 ARM PAIN: Status: RESOLVED | Noted: 2023-03-27 | Resolved: 2024-07-15

## 2024-07-15 PROBLEM — D17.72: Status: RESOLVED | Noted: 2023-03-27 | Resolved: 2024-07-15

## 2024-07-15 PROBLEM — N50.9 TESTICULAR LESION: Status: RESOLVED | Noted: 2023-03-27 | Resolved: 2024-07-15

## 2024-07-15 PROBLEM — M79.671 PAIN OF RIGHT HEEL: Status: RESOLVED | Noted: 2023-03-27 | Resolved: 2024-07-15

## 2024-07-15 PROBLEM — N50.89 SCROTAL MASS: Status: RESOLVED | Noted: 2023-03-27 | Resolved: 2024-07-15

## 2024-07-15 PROBLEM — F15.20: Status: RESOLVED | Noted: 2023-04-02 | Resolved: 2024-07-15

## 2024-07-15 PROBLEM — F19.10 POLYSUBSTANCE ABUSE (MULTI): Status: RESOLVED | Noted: 2023-03-27 | Resolved: 2024-07-15

## 2024-07-15 PROBLEM — K82.4 GALL BLADDER POLYP: Status: RESOLVED | Noted: 2023-03-27 | Resolved: 2024-07-15

## 2024-07-15 PROBLEM — R07.81 RIB PAIN ON RIGHT SIDE: Status: RESOLVED | Noted: 2023-03-27 | Resolved: 2024-07-15

## 2024-07-15 PROBLEM — M25.522 LEFT ELBOW PAIN: Status: RESOLVED | Noted: 2023-03-27 | Resolved: 2024-07-15

## 2024-07-15 PROBLEM — F14.20 COCAINE USE DISORDER, MODERATE, DEPENDENCE (MULTI): Status: RESOLVED | Noted: 2023-04-02 | Resolved: 2024-07-15

## 2024-07-15 PROCEDURE — 99214 OFFICE O/P EST MOD 30 MIN: CPT | Performed by: INTERNAL MEDICINE

## 2024-07-15 PROCEDURE — 3008F BODY MASS INDEX DOCD: CPT | Performed by: INTERNAL MEDICINE

## 2024-07-15 ASSESSMENT — ENCOUNTER SYMPTOMS
LOSS OF SENSATION IN FEET: 0
OCCASIONAL FEELINGS OF UNSTEADINESS: 0
DEPRESSION: 0

## 2024-07-16 NOTE — PROGRESS NOTES
"Subjective   Patient ID: Cecil Ruiz is a 49 y.o. male who presents for Syncope.    This gentleman today came here for follow-up on various conditions.  He had a syncopal episode, completely passed out on Thursday.  He was in the emergency room where stitches applied, but he was not admitted.  The patient is not feeling good.  He is feeling wobbly, palpitation, headache, weak, tired, fatigued, and exhausted.  Not feeling good.  His vision okay.    I have personally reviewed the patient's Past Medical History, Medications, Allergies, Social History, and Family History in the EMR.    Review of Systems   All other systems reviewed and are negative.    Objective   /80   Ht 1.753 m (5' 9\")   Wt 111 kg (244 lb)   BMI 36.03 kg/m²     Physical Exam  Vitals reviewed.   Eyes:      Comments: Underneath the right ______(eye?) there was stitches present.  Wound looks okay.  Little swelling.  Questionable nystagmus.   Cardiovascular:      Heart sounds: Normal heart sounds, S1 normal and S2 normal. No murmur heard.     No friction rub.   Pulmonary:      Effort: Pulmonary effort is normal.      Breath sounds: Normal breath sounds and air entry.   Abdominal:      Palpations: There is no hepatomegaly, splenomegaly or mass.   Musculoskeletal:      Right lower leg: No edema.      Left lower leg: No edema.   Lymphadenopathy:      Lower Body: No right inguinal adenopathy. No left inguinal adenopathy.   Neurological:      Cranial Nerves: Cranial nerves 2-12 are intact.      Sensory: No sensory deficit.      Motor: Motor function is intact.      Deep Tendon Reflexes: Reflexes are normal and symmetric.     LAB WORK:  Laboratory testing discussed.    Assessment/Plan   Problem List Items Addressed This Visit    None  Visit Diagnoses         Codes    Anxiety and depression    -  Primary F41.9, F32.A    Syncope, unspecified syncope type     R55    Relevant Orders    EEG    MR brain w and wo IV contrast    Referral to Neurology    " Gastroesophageal reflux disease, unspecified whether esophagitis present     K21.9    High cholesterol     E78.00        1. Syncope, completely passed out.  He has MRI done in 2019, but completely passing out versus seizure.  I ordered MRI of the brain to rule out any pathology.  CT scan was negative.  2. Seizure disorder possible.  I ordered EEG.  3. Cardiac.  He had 2D echo and Holter done.  I told him to see cardiologist.  4. Anxiety, depression, okay.  5. GERD, on PPI.  6. High cholesterol, on medication.  7. Follow-up appointment with me immediately after testing.  Referred to Neurology and Cardiology.    Scribe Attestation  By signing my name below, I, Naseem Andrade attest that this documentation has been prepared under the direction and in the presence of Aldo Forrester MD.

## 2024-07-30 ENCOUNTER — APPOINTMENT (OUTPATIENT)
Dept: PHYSICAL MEDICINE AND REHAB | Facility: CLINIC | Age: 49
End: 2024-07-30
Payer: COMMERCIAL

## 2024-08-06 ENCOUNTER — APPOINTMENT (OUTPATIENT)
Dept: BEHAVIORAL HEALTH | Facility: CLINIC | Age: 49
End: 2024-08-06
Payer: COMMERCIAL

## 2024-08-06 DIAGNOSIS — F41.8 ANXIETY WITH DEPRESSION: ICD-10-CM

## 2024-08-06 PROCEDURE — 99214 OFFICE O/P EST MOD 30 MIN: CPT | Performed by: PSYCHIATRY & NEUROLOGY

## 2024-08-06 NOTE — PROGRESS NOTES
Subjective   Patient ID: Cecil Ruiz is a 49 y.o. male.    HPI  The encounter diagnosis was Anxiety with depression.    +episode of loss of consciousness;  30 day eviction notice;  +mood - severely depressed with suicidal ideation;  +helplessness;  +hopelessness;  Uses THC;    July 20 one year anniversay from other substances (etoh, cocaine)  Off meds for last few days--  Functioning-decreased overall;  Flexible hours at [employer]--- working part time;  20 hour work weeks;        Review of Systems   Psychiatric/Behavioral:  Negative for behavioral problems, hallucinations, self-injury and suicidal ideas. The patient is not hyperactive.        Objective   Physical Exam  Psychiatric:         Mood and Affect: Mood is anxious and depressed.         Speech: Speech normal.         Behavior: Behavior is cooperative.         Thought Content: Thought content is not paranoid. Thought content does not include homicidal or suicidal ideation.         Judgment: Judgment normal.       Assessment/Plan   Current Outpatient Medications   Medication Instructions    ammonium lactate (Lac-Hydrin) 12 % lotion APPLY AND RUB IN A THIN LAYER TO AFFECTED AREA TWICE DAILY    ARIPiprazole (ABILIFY) 15 mg, oral, Daily    multivit with minerals/lutein (MULTIVITAMIN 50 PLUS ORAL) 1 tablet, oral, Daily    prazosin (MINIPRESS) 1 mg, oral, Nightly    QUEtiapine (SeroqueL) 25 mg tablet Take 1 tab at bedtime as needed    rosuvastatin (Crestor) 10 mg tablet 1 tablet, oral, Nightly    venlafaxine XR (EFFEXOR XR) 150 mg, oral, Daily, Do not crush or chew.     Patient Active Problem List    Diagnosis Date Noted    Encounter for medical screening examination 05/14/2024    Radiculopathy of cervical spine 11/14/2023    Alcohol dependence in remission (Multi) 04/02/2023    Knee pain 04/02/2023    Post-traumatic osteoarthritis of right knee 04/02/2023    Back pain 03/27/2023    Allergy 03/27/2023    Anxiety disorder 03/27/2023    Anxiety with depression  03/27/2023    Bilateral shoulder pain 03/27/2023    Arthritis of right knee 03/27/2023    Asthma ([facility]) 03/27/2023    Back injury 03/27/2023    Benign fasciculation-cramp syndrome 03/27/2023    Cervical facet syndrome 03/27/2023    Change in bowel habit 03/27/2023    Cough 03/27/2023    Dizziness 03/27/2023    Excessive daytime sleepiness 03/27/2023    Hypocholesterolemia 03/27/2023    Irritable bowel syndrome without diarrhea 03/27/2023    Left inguinal hernia 03/27/2023    Myofascial pain syndrome 03/27/2023    Neck pain 03/27/2023    Neuromyotonia 03/27/2023    FAMILIA (obstructive sleep apnea) 03/27/2023    Polycythemia vera (Multi) 03/27/2023    Recurrent umbilical hernia with incarceration 03/27/2023    Allergic rhinitis due to mold 03/27/2023    Arthritis 03/27/2023    Bursitis 03/27/2023    Rhinitis 03/27/2023    Right knee pain 03/27/2023       Diagnoses and all orders for this visit:  Anxiety with depression  -     Follow Up In Psychiatry; Future

## 2024-08-06 NOTE — LETTER
8/6/24      To Whom It May Concern;    Mr. Cecil Ruiz is diagnosed with Post Traumatic Stress Disorder.      Sincerely,    Naomie Jang MD  Department of Psychiatry  [place] [address]  Phone: [phone]  Fax: [phone]

## 2024-08-09 ASSESSMENT — ENCOUNTER SYMPTOMS
HYPERACTIVE: 0
HALLUCINATIONS: 0

## 2024-08-27 ENCOUNTER — APPOINTMENT (OUTPATIENT)
Dept: BEHAVIORAL HEALTH | Facility: CLINIC | Age: 49
End: 2024-08-27
Payer: COMMERCIAL

## 2024-08-27 DIAGNOSIS — F41.8 ANXIETY WITH DEPRESSION: ICD-10-CM

## 2024-08-27 DIAGNOSIS — F33.2 SEVERE RECURRENT MAJOR DEPRESSION WITHOUT PSYCHOTIC FEATURES (MULTI): ICD-10-CM

## 2024-08-27 DIAGNOSIS — F43.10 PTSD (POST-TRAUMATIC STRESS DISORDER): ICD-10-CM

## 2024-08-27 PROCEDURE — 99214 OFFICE O/P EST MOD 30 MIN: CPT | Performed by: PSYCHIATRY & NEUROLOGY

## 2024-08-27 NOTE — PROGRESS NOTES
Subjective   Patient ID: Cecil Ruiz is a 49 y.o. male.    HPI  Diagnoses of Severe recurrent major depression without psychotic features (Multi), Anxiety with depression, and PTSD (post-traumatic stress disorder) were pertinent to this visit.    Recent loss of consciousness --- undergoing full work up;  Going to see Neurologist and in the middle of cardiology work-up right now;  Contacted father for extra support;    Ongoing legal stress  Stressors negatively affecting functioning;      Review of Systems   Constitutional:  Positive for fatigue.     Psych Review of Symptoms:    Anxiety:     Additional Anxiety Symptoms: Panic attacks.  Panic attacks with worry about having panic attacks.        Depressive Symptoms:   Depressed mood, decreased interest, fatigue and low self esteem.       Manic Symptoms: Patient denied any symptoms.         Passive SI with no current substance use or guns in the home;  Supports-dtr, father;  Planning on returning to employment when feels better   No psychosis;  Making future plans for [place] paperwork to fill out;  Objective     Physical Exam  Psychiatric:         Speech: Speech normal.         Behavior: Behavior is not agitated, aggressive or hyperactive.         Thought Content: Thought content is not paranoid. Thought content does not include homicidal ideation.     Mood/affect: low- but denies wanting inpt hospitalization and denies imminent risk to self/others;  Labs:    Lab Test Results   Component Time Elapsed Value Range Status   QT Interval 48 days (07/11/24 1610) 370    ms Final result   QTC Fredericia 48 days (07/11/24 1610) 395    ms Final result   QTC Calculation(Bazett) 48 days (07/11/24 1610) 407    ms Final result           Assessment/Plan   Plans for next appt for paperwork    Diagnoses and all orders for this visit:  Severe recurrent major depression without psychotic features (Multi)  -     Follow Up In Psychiatry; Future  Anxiety with depression  -     venlafaxine  XR (Effexor XR) 150 mg 24 hr capsule; Take 1 capsule (150 mg) by mouth once daily. Do not crush or chew.  -     ARIPiprazole (Abilify) 15 mg tablet; Take 1 tablet (15 mg) by mouth once daily.  PTSD (post-traumatic stress disorder)  -     QUEtiapine (SeroqueL) 25 mg tablet; Take 1 tab at bedtime as needed  -     prazosin (Minipress) 1 mg capsule; Take 1 capsule (1 mg) by mouth once daily at bedtime.

## 2024-08-29 RX ORDER — QUETIAPINE FUMARATE 25 MG/1
TABLET, FILM COATED ORAL
Qty: 30 TABLET | Refills: 1 | Status: SHIPPED | OUTPATIENT
Start: 2024-08-29

## 2024-08-29 RX ORDER — ARIPIPRAZOLE 15 MG/1
15 TABLET ORAL DAILY
Qty: 30 TABLET | Refills: 0 | Status: SHIPPED | OUTPATIENT
Start: 2024-08-29

## 2024-08-29 RX ORDER — PRAZOSIN HYDROCHLORIDE 1 MG/1
1 CAPSULE ORAL NIGHTLY
Qty: 30 CAPSULE | Refills: 2 | Status: SHIPPED | OUTPATIENT
Start: 2024-08-29

## 2024-08-29 RX ORDER — VENLAFAXINE HYDROCHLORIDE 150 MG/1
150 CAPSULE, EXTENDED RELEASE ORAL DAILY
Qty: 30 CAPSULE | Refills: 2 | Status: SHIPPED | OUTPATIENT
Start: 2024-08-29

## 2024-08-29 ASSESSMENT — ENCOUNTER SYMPTOMS
PANIC: 1
DEPRESSED MOOD: 1
FATIGUE: 1

## 2024-09-03 ENCOUNTER — APPOINTMENT (OUTPATIENT)
Dept: BEHAVIORAL HEALTH | Facility: CLINIC | Age: 49
End: 2024-09-03
Payer: COMMERCIAL

## 2024-09-03 DIAGNOSIS — F43.10 PTSD (POST-TRAUMATIC STRESS DISORDER): ICD-10-CM

## 2024-09-03 DIAGNOSIS — F41.8 ANXIETY WITH DEPRESSION: ICD-10-CM

## 2024-09-03 PROCEDURE — 99215 OFFICE O/P EST HI 40 MIN: CPT | Performed by: PSYCHIATRY & NEUROLOGY

## 2024-09-03 RX ORDER — ARIPIPRAZOLE 5 MG/1
5 TABLET ORAL DAILY
Qty: 30 TABLET | Refills: 2 | Status: SHIPPED | OUTPATIENT
Start: 2024-09-03

## 2024-09-03 NOTE — PROGRESS NOTES
Subjective   Patient ID: Cecil Ruiz is a 49 y.o. male.    HPI  Diagnoses of Anxiety with depression and PTSD (post-traumatic stress disorder) were pertinent to this visit.  Paperwork was completed for FMLA;  +severe stressors: health related, legal, financial, primary relationship, care provider for dtr;  In recovery from substance dependence;  +depression, anxiety, pain  Review of Systems  Psych Review of Symptoms:    Anxiety:   Generalized Anxiety Symptoms: Excessive worry, fatigues easily and sleep disturbances due to anxiety.   Additional Anxiety Symptoms: Panic attacks.  Panic attacks with worry about having panic attacks.  Manifestations of panic attack include feeling of losing control and feeling of impending doom.       Depressive Symptoms:   Psychomotor retardation, guilt and suicidal ideation.       Obsessive-Compulsive Symptoms: Patient denied any symptoms.         Psychotic Symptoms: Patient denied any symptoms.           Trauma Related Symptoms:     Avoidance of external reminders.         Objective     Physical Exam  Psychiatric:         Attention and Perception: Perception normal.         Thought Content: Thought content is not paranoid. Thought content does not include homicidal ideation.         Judgment: Judgment normal.     Mood/affect: depressed, worried    Assessment/Plan   Additional time spent filling out paperwork for FMLA  Also, reviewed medications:  Decrease abilify due to side effects (orthostatic hypotension)   Pt denies imminent risk to self /others;  Pt denies substance dependence;  Pt has supports (has reached out to his father)  Diagnoses and all orders for this visit:  Anxiety with depression  -     ARIPiprazole (Abilify) 5 mg tablet; Take 1 tablet (5 mg) by mouth once daily.  -     Follow Up In Psychiatry; Future  -     Referral to Intensive Outpatient Program; Future  PTSD (post-traumatic stress disorder)  -     Follow Up In Psychiatry; Future  -     Referral to Intensive  Outpatient Program; Future

## 2024-09-08 ASSESSMENT — ENCOUNTER SYMPTOMS
FATIGUES EASILY: 1
PANIC: 1
PSYCHOMOTOR RETARDATION: 1

## 2024-09-09 ENCOUNTER — PATIENT MESSAGE (OUTPATIENT)
Dept: BEHAVIORAL HEALTH | Facility: CLINIC | Age: 49
End: 2024-09-09
Payer: COMMERCIAL

## 2024-09-09 ENCOUNTER — OFFICE VISIT (OUTPATIENT)
Dept: CARDIOLOGY | Facility: CLINIC | Age: 49
End: 2024-09-09
Payer: COMMERCIAL

## 2024-09-09 VITALS
BODY MASS INDEX: 36.24 KG/M2 | WEIGHT: 245.4 LBS | SYSTOLIC BLOOD PRESSURE: 128 MMHG | DIASTOLIC BLOOD PRESSURE: 86 MMHG | HEART RATE: 76 BPM | OXYGEN SATURATION: 96 %

## 2024-09-09 DIAGNOSIS — R55 SYNCOPE AND COLLAPSE: Primary | ICD-10-CM

## 2024-09-09 PROCEDURE — 99214 OFFICE O/P EST MOD 30 MIN: CPT | Performed by: INTERNAL MEDICINE

## 2024-09-09 PROCEDURE — 99204 OFFICE O/P NEW MOD 45 MIN: CPT | Performed by: INTERNAL MEDICINE

## 2024-09-09 ASSESSMENT — PAIN SCALES - GENERAL: PAINLEVEL: 0-NO PAIN

## 2024-09-09 ASSESSMENT — COLUMBIA-SUICIDE SEVERITY RATING SCALE - C-SSRS
6. HAVE YOU EVER DONE ANYTHING, STARTED TO DO ANYTHING, OR PREPARED TO DO ANYTHING TO END YOUR LIFE?: NO
1. IN THE PAST MONTH, HAVE YOU WISHED YOU WERE DEAD OR WISHED YOU COULD GO TO SLEEP AND NOT WAKE UP?: YES
2. HAVE YOU ACTUALLY HAD ANY THOUGHTS OF KILLING YOURSELF?: NO

## 2024-09-09 ASSESSMENT — PATIENT HEALTH QUESTIONNAIRE - PHQ9
2. FEELING DOWN, DEPRESSED OR HOPELESS: SEVERAL DAYS
10. IF YOU CHECKED OFF ANY PROBLEMS, HOW DIFFICULT HAVE THESE PROBLEMS MADE IT FOR YOU TO DO YOUR WORK, TAKE CARE OF THINGS AT HOME, OR GET ALONG WITH OTHER PEOPLE: SOMEWHAT DIFFICULT
1. LITTLE INTEREST OR PLEASURE IN DOING THINGS: SEVERAL DAYS
SUM OF ALL RESPONSES TO PHQ9 QUESTIONS 1 AND 2: 2

## 2024-09-11 ENCOUNTER — HOSPITAL ENCOUNTER (OUTPATIENT)
Dept: RADIOLOGY | Facility: CLINIC | Age: 49
Discharge: HOME | End: 2024-09-11
Payer: COMMERCIAL

## 2024-09-11 DIAGNOSIS — R55 SYNCOPE, UNSPECIFIED SYNCOPE TYPE: ICD-10-CM

## 2024-09-11 PROCEDURE — 70551 MRI BRAIN STEM W/O DYE: CPT

## 2024-09-11 PROCEDURE — 70551 MRI BRAIN STEM W/O DYE: CPT | Performed by: RADIOLOGY

## 2024-09-14 NOTE — PROGRESS NOTES
Primary Care Physician: Aldo Forrester MD  Date of Visit: 09/09/2024  1:15 PM EDT  [place] [facility] [place] [address]     Chief Complaint:   Chief Complaint   Patient presents with    Syncope     Had episode of syncope when standing up from a squatting position. Has frequent episodes of lightheaded/dizziness when standing.  Has worn Monitor and had EKG.       HPI / Summary:   Cecil Ruiz is a 49 y.o. male presents for new patient    ROS    Medical History:   He has a past medical history of Personal history of other diseases of the digestive system.  Surgical Hx:   He has a past surgical history that includes Other surgical history (10/20/2021); Other surgical history (10/20/2021); and Knee surgery (04/21/2017).   Social Hx:   He reports that he has quit smoking. His smoking use included cigarettes. He does not have any smokeless tobacco history on file. He reports that he does not drink alcohol. No history on file for drug use.  Family Hx:   His family history includes Asthma in his sister; Atrial fibrillation in his father; Obesity in his mother; Pancreatic cancer in his mother; Sleep apnea in his father.   Allergies:  Allergies   Allergen Reactions    House Dust Unknown    Mold Unknown    Penicillins Angioedema     Outpatient Medications:  Current Outpatient Medications   Medication Instructions    ammonium lactate (Lac-Hydrin) 12 % lotion APPLY AND RUB IN A THIN LAYER TO AFFECTED AREA TWICE DAILY    ARIPiprazole (ABILIFY) 5 mg, oral, Daily    multivit with minerals/lutein (MULTIVITAMIN 50 PLUS ORAL) 1 tablet, oral, Daily    QUEtiapine (SeroqueL) 25 mg tablet Take 1 tab at bedtime as needed    rosuvastatin (Crestor) 10 mg tablet 1 tablet, oral, Nightly    venlafaxine XR (EFFEXOR XR) 150 mg, oral, Daily, Do not crush or chew.     Physical Exam:  Vitals:    09/09/24 1303 09/09/24 1336   BP: 122/87 128/86   BP Location: Right arm    Pulse: 79 76   SpO2: 96%    Weight: 111 kg (245 lb 6.4 oz)      Wt Readings  from Last 5 Encounters:   09/09/24 111 kg (245 lb 6.4 oz)   09/11/24 111 kg (245 lb)   07/15/24 111 kg (244 lb)   07/11/24 109 kg (240 lb)   06/26/24 112 kg (246 lb)     Physical Exam  JVP not elevated. Carotid impulses are 2+ without overlying bruit.   Chest exhibits fair to good air movement with completely clear breath sounds.   The cardiac rhythm is regular with no premature beats.   Normal S1 and S2. No gallop, murmur or rub, or click.   Abdomen is soft and benign without focal tenderness.   With no lower leg edema. The pedal pulses are intact.     Last Labs:  Admission on 07/11/2024, Discharged on 07/11/2024   Component Date Value    WBC 07/11/2024 6.1     nRBC 07/11/2024 0.0     RBC 07/11/2024 5.42     Hemoglobin 07/11/2024 16.5     Hematocrit 07/11/2024 48.5     MCV 07/11/2024 90     MCH 07/11/2024 30.4     MCHC 07/11/2024 34.0     RDW 07/11/2024 12.9     Platelets 07/11/2024 243     Neutrophils % 07/11/2024 61.2     Immature Granulocytes %,* 07/11/2024 0.2     Lymphocytes % 07/11/2024 29.0     Monocytes % 07/11/2024 5.6     Eosinophils % 07/11/2024 2.8     Basophils % 07/11/2024 1.2     Neutrophils Absolute 07/11/2024 3.71     Immature Granulocytes Ab* 07/11/2024 0.01     Lymphocytes Absolute 07/11/2024 1.76     Monocytes Absolute 07/11/2024 0.34     Eosinophils Absolute 07/11/2024 0.17     Basophils Absolute 07/11/2024 0.07     Glucose 07/11/2024 107 (H)     Sodium 07/11/2024 139     Potassium 07/11/2024 3.6     Chloride 07/11/2024 104     Bicarbonate 07/11/2024 23 (L)     Urea Nitrogen 07/11/2024 14     Creatinine 07/11/2024 1.00     eGFR 07/11/2024 >90     Calcium 07/11/2024 9.2     Albumin 07/11/2024 4.3     Alkaline Phosphatase 07/11/2024 64     Total Protein 07/11/2024 7.0     AST 07/11/2024 19     Bilirubin, Total 07/11/2024 0.7     ALT 07/11/2024 26     Anion Gap 07/11/2024 12     Magnesium 07/11/2024 2.10     Ventricular Rate 07/11/2024 73     Atrial Rate 07/11/2024 73     IN Interval 07/11/2024  140     QRS Duration 07/11/2024 86     QT Interval 07/11/2024 370     QTC Calculation(Bazett) 07/11/2024 407     P Axis 07/11/2024 46     R Axis 07/11/2024 -2     T [place] 07/11/2024 28     QRS Count 07/11/2024 12     Q Onset 07/11/2024 207     P Onset 07/11/2024 137     P Offset 07/11/2024 168     T Offset 07/11/2024 392     QTC Fredericia 07/11/2024 395     Troponin T, High Sensiti* 07/11/2024 8     Troponin T, High Sensiti* 07/11/2024 6    Hospital Outpatient Visit on 06/20/2024   Component Date Value    AV pk tia 06/20/2024 1.50     AV mn grad 06/20/2024 3.5     LVOT diam 06/20/2024 1.96     MV E/A ratio 06/20/2024 0.85     LA vol index A/L 06/20/2024 23.8     Tricuspid annular plane * 06/20/2024 2.4     LV EF 06/20/2024 58     RV free wall pk S' 06/20/2024 11.00     LVIDd 06/20/2024 5.53     Aortic Valve Area by Con* 06/20/2024 2.95     Aortic Valve Area by Con* 06/20/2024 2.42     AV pk grad 06/20/2024 8.9     LV A4C EF 06/20/2024 54.7    Lab on 06/06/2024   Component Date Value    WBC 06/06/2024 7.6     nRBC 06/06/2024 0.0     RBC 06/06/2024 5.70     Hemoglobin 06/06/2024 17.2     Hematocrit 06/06/2024 52.7 (H)     MCV 06/06/2024 93     MCH 06/06/2024 30.2     MCHC 06/06/2024 32.6     RDW 06/06/2024 12.9     Platelets 06/06/2024 292     Glucose 06/06/2024 92     Sodium 06/06/2024 142     Potassium 06/06/2024 3.9     Chloride 06/06/2024 103     Bicarbonate 06/06/2024 29     Anion Gap 06/06/2024 14     Urea Nitrogen 06/06/2024 14     Creatinine 06/06/2024 1.00     eGFR 06/06/2024 >90     Calcium 06/06/2024 9.9     Albumin 06/06/2024 4.6     Alkaline Phosphatase 06/06/2024 58     Total Protein 06/06/2024 7.1     AST 06/06/2024 20     Bilirubin, Total 06/06/2024 0.8     ALT 06/06/2024 26     Thyroid Stimulating Horm* 06/06/2024 1.56     Cholesterol 06/06/2024 228 (H)     HDL-Cholesterol 06/06/2024 47.9     Cholesterol/HDL Ratio 06/06/2024 4.8     LDL Calculated 06/06/2024 143 (H)     VLDL 06/06/2024 37      Triglycerides 06/06/2024 184 (H)     Non HDL Cholesterol 06/06/2024 180 (H)     Triiodothyronine, Free 06/06/2024 2.8     Thyroxine, Free 06/06/2024 1.20    Hospital Outpatient Visit on 06/04/2024   Component Date Value    BSA 06/04/2024 2.31         Assessment/Plan   1.  Presyncope.  Patient is a middle-aged white male whose past history negative for hypertension and diabetes notable for former smoking and mild hyperlipidemia.  The patient has had a number of episodes of lightheadedness that occurs after squatting.  The patient for uncertain reasons has a tendency to squat after he has been standing for an extended period of time.  In 1 instance he was waiting at a bus stop he humid day.  He was standing squatted down and then stood up with a subsequent loss of consciousness and forward fall to his face.  Patient was seen at the [facility] emergency room that day 7/11/2024 required 7 sutures over the right periorbital area.  EKG done 7/11/2024 showed sinus rhythm unremarkable rate 73/min.  Head CT was negative for subdural hematoma.  CT scan of maxillofacial bones negative for fracture.  CT scan of cervical spine negative for fracture or subluxation.  The patient has had similar but not as profound episodes in the past again only with a squatting.  The patient had had a prior echocardiogram on 6/20/2024 that was unremarkable demonstrating LV ejection fraction 55 to 60%.  He had a 2-week external cardiac monitor applied 6/4/2024 to 6/18/2024 which showed sinus rhythm averaging 72/min range /min with only 1 PVC and 136 PACs.  The patient of note is on Minipress for nightmares.  Suspect patient is having postural hypotension that occurs after squatting and standing due to reduced venous return from the squat.  He will be instructed to discontinue the Minipress which potentially may make the symptoms worse and also advised to avoid a tendency to do squats he will see his behavioral health specialist to seek an  alternative to the Minipress.  Patient return in 3 months to assess response to adjustments.  2.  Hyperlipidemia.  Patient's lipid panel 6/6/2024 includes cholesterol 228  HDL 48 triglyceride 184.  Patient is currently on rosuvastatin 10 mg daily will need to repeat lipid panel.  3.  Former smoking.  Patient had smoked 1 pack/day quit 2011.  5.Status post cholecystectomy.  6.  Status post open reduction internal fixation of a right tibial fracture.  7.  Status post arthroscopic meniscectomy of the right knee.  8.  Status post left inguinal hernia repair  9.  Status post umbilical hernia repair.  10.  Status post nasal septoplasty for deviated nasal septum.        Orders:  No orders of the defined types were placed in this encounter.     Followup Appts:  Future Appointments   Date Time Provider Department Center   9/17/2024  1:30 PM Naomie Jang MD [id number] [facility]   9/25/2024  1:00 PM Yolanda Naidu MD [id number] [facility]   9/30/2024  1:00 PM Roger Mendoza MD [id number] None   12/16/2024  1:15 PM Elder Hargrove MD [id number] [facility]           ____________________________________________________________  Elder Hargrove MD  [facility]  Assistant Clinical Professor, [employer]  [facility]

## 2024-09-17 ENCOUNTER — APPOINTMENT (OUTPATIENT)
Dept: BEHAVIORAL HEALTH | Facility: CLINIC | Age: 49
End: 2024-09-17
Payer: COMMERCIAL

## 2024-09-17 DIAGNOSIS — F43.10 PTSD (POST-TRAUMATIC STRESS DISORDER): ICD-10-CM

## 2024-09-17 DIAGNOSIS — F41.8 ANXIETY WITH DEPRESSION: ICD-10-CM

## 2024-09-17 PROCEDURE — 99214 OFFICE O/P EST MOD 30 MIN: CPT | Performed by: PSYCHIATRY & NEUROLOGY

## 2024-09-17 NOTE — PROGRESS NOTES
Subjective   Patient ID: Cecil Ruiz is a 49 y.o. male.    HPI  Diagnoses of Anxiety with depression and PTSD (post-traumatic stress disorder) were pertinent to this visit.  Stopped prazosin per Cardiology request;  Abilify is 5 mg daily   Pt denies sx of orthostatic hypotension;  Pt denied recent falls or LOC.  Recorded vitals:  BP and HR 9/9/24:  128/86  as of 9/9/2024 128/86 122/87    Heart Rate: 76  as of 9/9/2024 76 79       Review of Systems  Psych Review of Symptoms:    Anxiety:   Generalized Anxiety Symptoms: Difficulty controlling worry and excessive worry.       Manic Symptoms: Patient denied any symptoms.         Obsessive-Compulsive Symptoms: Patient denied any symptoms.         Psychotic Symptoms: Patient denied any symptoms.     Halluncination types are negative for auditory.      Trauma Related Symptoms:     Avoidance of associated memories, thoughts, or feelings, concentration difficulties, sleep disturbances, avoidance of external reminders, reports disturbing dreams, nightmares, or other frightening images and psychological distress to trauma triggers.           Objective     Physical Exam  Psychiatric:         Attention and Perception: Perception normal.         Thought Content: Thought content is not paranoid. Thought content does not include homicidal ideation.         Judgment: Judgment normal.     Mood/affect: low mood, anxious, +distressed without psychotic sx or SI/HI;    Assessment/Plan   FMLA completed;  Referral for individual therapy  Pt declines participating in IOP program;  Current Outpatient Medications   Medication Instructions    ammonium lactate (Lac-Hydrin) 12 % lotion APPLY AND RUB IN A THIN LAYER TO AFFECTED AREA TWICE DAILY    ARIPiprazole (ABILIFY) 5 mg, oral, Daily    multivit with minerals/lutein (MULTIVITAMIN 50 PLUS ORAL) 1 tablet, oral, Daily    QUEtiapine (SeroqueL) 25 mg tablet Take 1 tab at bedtime as needed    rosuvastatin (Crestor) 10 mg tablet 1 tablet, oral,  Nightly    venlafaxine XR (EFFEXOR XR) 150 mg, oral, Daily, Do not crush or chew.       Diagnoses and all orders for this visit:  Anxiety with depression  -     Follow Up In Psychiatry; Future  -     Referral to Social Work; Future  -     venlafaxine XR (Effexor XR) 150 mg 24 hr capsule; Take 1 capsule (150 mg) by mouth once daily. Do not crush or chew.  -     ARIPiprazole (Abilify) 5 mg tablet; Take 1 tablet (5 mg) by mouth once daily.  PTSD (post-traumatic stress disorder)  -     QUEtiapine (SeroqueL) 25 mg tablet; Take 1 tab at bedtime as needed

## 2024-09-20 RX ORDER — ARIPIPRAZOLE 5 MG/1
5 TABLET ORAL DAILY
Qty: 30 TABLET | Refills: 2 | Status: SHIPPED | OUTPATIENT
Start: 2024-09-20

## 2024-09-20 RX ORDER — VENLAFAXINE HYDROCHLORIDE 150 MG/1
150 CAPSULE, EXTENDED RELEASE ORAL DAILY
Qty: 30 CAPSULE | Refills: 5 | Status: SHIPPED | OUTPATIENT
Start: 2024-09-20

## 2024-09-20 RX ORDER — QUETIAPINE FUMARATE 25 MG/1
TABLET, FILM COATED ORAL
Qty: 30 TABLET | Refills: 2 | Status: SHIPPED | OUTPATIENT
Start: 2024-09-20

## 2024-09-24 NOTE — PROGRESS NOTES
Chief complaint:  right knee pain. Follow up    This s a pleasant 49-year-old right-handed man with past medical history of HTN, HLD, anxiety/depression, right tibial fracture, and chronic knee pain who presents for follow up of  right knee pain.    He was last seen here on 6/26/2024, at which point he was still doing well following a right ultrasound-guided pes anserinus bursitis injection back in March 2024.    I advised him to continue Voltaren gel as needed and he has successfully weaned off Topamax.  I advised to continue physical therapy and home exercises.      He fell 7/11/24, squatting, and then when he stood up, he passed out and fell on his face, ended up with stiches. Getting outpatient work up with cardiology and neurology. No more episodes since psych took him off prazosin. Psych in the meantime kept him off work from [employer] until 10/3/24 while this is being worked up.    Overall, his knee and his back have been feeling quite good and have not flared up.  He is ready to go back to work full-time without restrictions once he is cleared by psychology to return on 10/3/2024.    He is asking me to fill out some paperwork for Social Security disability from his .  I will mail this to him.    He rates his pain as a 0/10, previously 0/10    Otherwise, there have been no changes to medications or past medical history since the last visit.      __________________________  3/19/24: Right ultrasound-guided pes anserinus bursitis injection, try Voltaren gel, consider other medication injections, FMLA paperwork filled out again, proceed with physical therapy and daily home exercises  5/8/2024: Wean off Topamax as tolerated, continue PT and home exercises, FMLA paperwork filled out again   6/26/2024: Patellofemoral exercises provided, try Voltaren gel, consider other medications and injections, FMLA paperwork filled out  9/25/2024: Continue Voltaren gel as needed, continue exercises, work paperwork filled out,  "follow-up as needed  _________________________  As a reminder:    TIMELINE OF COMPLAINT(S):  Right knee arthritis and chronic knee pain after a traumatic tibial fracture in 2007 and surgical repair. Patient states knee started hurting near hardware 2 years post surgery. Meniscus torn September 2021, Dr. Saavedra performed laparoscopic meniscus repair. He has not been able to work since 2021.  He is s/p genicular RFA in September 2023 which did not help. He says he is ready for a knee replacement but 3-4 orthopedic surgeons say he is not ready due to age and radiographs. He has history of cocaine use, sober since 7/2023. He states he was started on topiramate 3 months ago and developed a rash on his face 1 month ago.  Patient also reports low back pain that is long standing but would like to focus on his knee pain at today's visit.    -He has some occasional urge bowel incontinence, denies any urine ncontinence or saddle anesthesia.    Pain:  LOCATION- medial right knee pointing to the pes anserinus area  RADIATION- no  ASSOCIATED WITH- Buckling  NUMBNESS/TINGLING- No  WEAKNESS- No  CONSTANT or INTERMITTENT- Constant  SEVERITY/QUANTITY- 3  QUALITY- Achy, burning along scars  EXACERBATED BY- Stairs, inclines, squats  BETTER WITH- Ice  TRIED- Ibuprofen, Topiramate      Anti-Inflammatories: ketorolac (didn't help), prednisone, ibuprofen, meloxicam, never tried Voltaren gel      Muscle relaxants: clonazepam (for panic), cyclobenzaprine, (doesn't remember).      Anti-depressants: multiple over years, no SNRI or TCA, currently on Abilify, Lexapro, and Buspar      Neuroleptics: Topiramate helped significantly, gabapentin (300mg TID),       LDN:    PHYSICAL THERAPY: Yes, 1-2yrs ago.  No home exercises  TENS unit: No  CHIROPRACTIC MANIPULATION: No  ACUPUNCTURE TREATMENTS: No  DEEP TISSUE MASSAGE THERAPY: No  OSTEOPATHIC MANIPULATION THERAPY: No  INJECTIONS: Previous cortisone and \"cortisone like injection\" did not help.  He does " "not think he had any hyaluronic acid injections, genicular nerve block 8/2023 (seemed to help), RFA 9/2023 (didn't help)  EMG/NCS: No    IMAGING: Yes      === 05/04/23 ===    XR KNEE COMPLETE 4 OR MORE VIEWS  Surgical plate and numerous surgical screws are again seen throughout  the proximal right tibia. Redemonstrated plateau fracture is  unchanged in appearance compared to prior imaging. No new displaced  fractures are evident. Normal bone mineralization. No sizable  suprapatellar joint effusion of the right knee.  - Impression -  Postsurgical changes of the proximal right tibia without significant  change compared to prior imaging.    XR R Knee 5/31/23  Postsurgical changes ORIF right proximal tibia with multiple intact   surgical screws.  There is mild osteoarthritis lateral compartment with   joint space narrowing and small marginal osteophytes.  Small   patellofemoral osteophytes are     FUNCTIONAL HISTORY: The patient is independent in all ADLs, mobility, and driving. The patient does use a cane for long distances only.    SH:  Lives in: [place]   Lives with: Daughter  Occupation: [employer]  Tobacco: Former, quit 2010. One pack daily  Alcohol: \"lots\"  Drugs: Former, quit cocaine in July 2023    ____________________________  ROS: The patient denies any bowel or bladder incontinence/accidents, night sweats, fevers, chills, recent significant weight loss. A 14 point review of systems was reviewed with the patient and is as above and otherwise negative.  ROS questionnaire positive for fatigue, depression, anxiety, sleep disturbances    PHYSICAL EXAM    GEN - Alert, well-developed, well-nourished, no acute distress  PSYCH - Cooperative, appropriate mood and affect  HEENT - NC/AT, erythematous rash to bridge of nose, legs.  RESP - Non-labored respirations, equal expansion  CV - warm and well-perfused, No cyanosis or edema in extremities.   BD- soft, ND, overweight  SKIN -no rash    Previous:    Right knee: 15cm surgical " scar diagonally medial knee and 4cm vertical scar laterally, both tender to palpation. No effusion, or erythema, some warmth noted, multiple small scabs on knee, one bleeding.  No popliteal fullness.  No anterior, posterior, medial or lateral instability.  Significant tenderness over the right pes anserine reminiscent of his pain..  There is no significant pain with hyperflexion of the knee.  There is no pain with varus or valgus stressing of the knee during flexion and extension.  There is no crepitus.  There is medial joint line tenderness. Patellar grind negative.  Left knee: normal exam.    NEURO -   LE strength 5/5 -  including hip flexors, knee flexors, knee extensors, ankle dorsiflexors, ankle plantar flexors, and EHL   Sensation - intact to light touch in bilateral lower extremities except for decreased to light touch right anterior knee both medial and lateral.  Reflexes - 3+ patellar and 2+ Achilles reflexes bilaterally.  1 or 2 nonsustained beats of clonus bilaterally, normal reflexes in the upper extremities, negative Estefania's, No Babinski.  GAIT - Normal base, normal stride length, antalgic.  Slow able to toe walk and heel walk without difficulty but with pain. Able to perform tandem gait without difficulty but painful.         IMPRESSION:    This  is a pleasant 49-year-old right-handed man with past medical history of HTN, anxiety/depression, right tibial fracture, and chronic knee pain who presents for follow-up of right knee pain.  Physical exam is reassuring for lack of neurologic compromise, there is some hyperreflexia in bilateral lower extremities.  Symptoms of physical exam findings consistent with pes anserinus bursitis rather than internal knee derangement.      -continue Voltaren gel on the area of pain 4 times per day as needed  -Consider other medications in the future  -Consider knee gel injections, repeat pes anserine bursitis injection  -Consider referral to rheumatology in the  future  -Continue daily home exercises, dynamic active strengthening  -Consider lumbar MRI, knee MRI in the future  -FMLA paperwork filled out, o to return to work full time without restrictions  -Follow-up as needed       The patient expressed understanding and agreement with the assessment and plan. Patient encouraged to contact us should they have any questions, concerns, or any changes in symptoms.      Thank you for allowing me to participate in the care of your patient.       ** Dictated with voice recognition software, please forgive any errors in grammar and/or spelling **

## 2024-09-24 NOTE — PATIENT INSTRUCTIONS
-continue Voltaren gel on the area of pain 4 times per day as needed  -Consider other medications in the future  -Consider knee gel injections, repeat pes anserine bursitis injection  -Consider referral to rheumatology in the future  -Continue daily home exercises, dynamic active strengthening  -Consider lumbar MRI, knee MRI in the future  -FMLA paperwork filled out, o to return to work full time without restrictions  -Follow-up as needed

## 2024-09-25 ENCOUNTER — APPOINTMENT (OUTPATIENT)
Dept: PHYSICAL MEDICINE AND REHAB | Facility: CLINIC | Age: 49
End: 2024-09-25
Payer: COMMERCIAL

## 2024-09-25 VITALS
TEMPERATURE: 98.6 F | SYSTOLIC BLOOD PRESSURE: 127 MMHG | OXYGEN SATURATION: 97 % | DIASTOLIC BLOOD PRESSURE: 84 MMHG | WEIGHT: 247 LBS | BODY MASS INDEX: 36.58 KG/M2 | HEART RATE: 77 BPM | HEIGHT: 69 IN

## 2024-09-25 DIAGNOSIS — M54.50 CHRONIC LOW BACK PAIN, UNSPECIFIED BACK PAIN LATERALITY, UNSPECIFIED WHETHER SCIATICA PRESENT: Primary | ICD-10-CM

## 2024-09-25 DIAGNOSIS — G89.29 CHRONIC PAIN OF RIGHT KNEE: ICD-10-CM

## 2024-09-25 DIAGNOSIS — M25.512 CHRONIC PAIN OF BOTH SHOULDERS: ICD-10-CM

## 2024-09-25 DIAGNOSIS — M25.561 CHRONIC PAIN OF RIGHT KNEE: ICD-10-CM

## 2024-09-25 DIAGNOSIS — G89.29 CHRONIC PAIN OF BOTH SHOULDERS: ICD-10-CM

## 2024-09-25 DIAGNOSIS — M79.18 MYOFASCIAL PAIN SYNDROME: ICD-10-CM

## 2024-09-25 DIAGNOSIS — M17.31 POST-TRAUMATIC OSTEOARTHRITIS OF RIGHT KNEE: ICD-10-CM

## 2024-09-25 DIAGNOSIS — M54.2 NECK PAIN: ICD-10-CM

## 2024-09-25 DIAGNOSIS — G89.29 CHRONIC LOW BACK PAIN, UNSPECIFIED BACK PAIN LATERALITY, UNSPECIFIED WHETHER SCIATICA PRESENT: Primary | ICD-10-CM

## 2024-09-25 DIAGNOSIS — M70.51 PES ANSERINUS BURSITIS OF RIGHT KNEE: ICD-10-CM

## 2024-09-25 DIAGNOSIS — M25.511 CHRONIC PAIN OF BOTH SHOULDERS: ICD-10-CM

## 2024-09-25 PROCEDURE — 3008F BODY MASS INDEX DOCD: CPT | Performed by: PHYSICAL MEDICINE & REHABILITATION

## 2024-09-25 PROCEDURE — 99213 OFFICE O/P EST LOW 20 MIN: CPT | Performed by: PHYSICAL MEDICINE & REHABILITATION

## 2024-09-25 ASSESSMENT — PAIN SCALES - GENERAL: PAINLEVEL: 0-NO PAIN

## 2024-09-30 ENCOUNTER — OFFICE VISIT (OUTPATIENT)
Dept: NEUROLOGY | Facility: CLINIC | Age: 49
End: 2024-09-30
Payer: COMMERCIAL

## 2024-09-30 VITALS
RESPIRATION RATE: 17 BRPM | HEIGHT: 69 IN | SYSTOLIC BLOOD PRESSURE: 106 MMHG | BODY MASS INDEX: 35.55 KG/M2 | TEMPERATURE: 97.3 F | WEIGHT: 240 LBS | HEART RATE: 78 BPM | DIASTOLIC BLOOD PRESSURE: 78 MMHG

## 2024-09-30 DIAGNOSIS — R55 SYNCOPE, UNSPECIFIED SYNCOPE TYPE: ICD-10-CM

## 2024-09-30 PROCEDURE — 3008F BODY MASS INDEX DOCD: CPT | Performed by: STUDENT IN AN ORGANIZED HEALTH CARE EDUCATION/TRAINING PROGRAM

## 2024-09-30 PROCEDURE — 99203 OFFICE O/P NEW LOW 30 MIN: CPT | Performed by: STUDENT IN AN ORGANIZED HEALTH CARE EDUCATION/TRAINING PROGRAM

## 2024-09-30 ASSESSMENT — PATIENT HEALTH QUESTIONNAIRE - PHQ9
SUM OF ALL RESPONSES TO PHQ9 QUESTIONS 1 AND 2: 0
1. LITTLE INTEREST OR PLEASURE IN DOING THINGS: NOT AT ALL
2. FEELING DOWN, DEPRESSED OR HOPELESS: NOT AT ALL

## 2024-09-30 ASSESSMENT — PAIN SCALES - GENERAL: PAINLEVEL: 0-NO PAIN

## 2024-09-30 ASSESSMENT — ENCOUNTER SYMPTOMS
LOSS OF SENSATION IN FEET: 0
OCCASIONAL FEELINGS OF UNSTEADINESS: 0
DEPRESSION: 0

## 2024-09-30 NOTE — PROGRESS NOTES
"   [facility]   Cecil Ruiz is a 49 y.o. year old male with past medical history of HTN, HLD, anxiety/depression, right tibial fracture, and chronic knee pain presenting for neurologic evaluation for syncope.     Referred by: Aldo Forrester MD  PCP: Aldo Forrester MD    Pt had a syncopal event 7/11. Pt was squatting for a prolonged time, and when he got up, he got dizzy and passed out. He got a laceration in his forehead, was evaluated in ED, had laceration repaired.     Had MRI done with PCP Dr. Forrester. EEG was also ordered -- not done.   Was taken off of prazosin and sx improved.     Pt presents to clinic 9/30 for evaluation. States his dizziness is essentially only when he tries to get up after he squats down for long time. He works at [employer], does frequent squatting for lifting objects but not for prolonged time, never had dizziness while at work. Also sx seems improved after stopping prazosin.       Review of imaging, echocardiogram, labs and other data:   MRI: no evidence of cortical lesions, strokes, tumors   Vessel imaging: n/a   Echo:            Ejection Fraction: EF 55-60%          LA size: normal in size           Bubble study: n/a     Relevant ROS, Problem list, Past Medical/ Surgical/ Family/ Social history- reviewed and pertinent details noted in history.     Objective     Visit Vitals  /78   Pulse 78   Temp 36.3 °C (97.3 °F) (Temporal)   Resp 17   Ht 1.753 m (5' 9\")   Wt 109 kg (240 lb)   BMI 35.44 kg/m²   Smoking Status Former   BSA 2.3 m²       MENTAL STATUS:  General appearance: resting in bed, in NAD  Orientation: [place] to self, time, place and condition   Language: Expression, repetition, naming, comprehension intact.   Concentration: Intact  Fund of knowledge: Appropriate    CRANIAL NERVES:  - Fundoscopic exam: Deferred   - II/III: PERRL  - II:  Visual fields intact to confrontation bilaterally   - III, IV, VI: EOMI to pursuit without nystagmus  - V: " V1-V3 sensation intact bilaterally  - VII: Face muscles symmetric with smile and eye closure  - VIII: Intact to finger rub  - IX, X: Palate elevated symmetrically bilaterally, no hoarseness  - XI: 5/5 strength on shoulder shrugging bilaterally  - XII: Tongue midline without atrophy or fasciculation    MOTOR: Tone and bulk normal in all extremities    STRENGTH: R L  Deltoid  5 5  Biceps  5 5  Triceps  5 5    5 5  Finger Abd 5 5  Hip flexion 5 5  Quadriceps 5 5  Hamstrings 5 5  DorsiFlex 5 5  PlantarFlex 5 5    REFLEXES: R L  Biceps  +2 +2  Triceps  +2 +2  Brachioradialis +2 +2  Patellar  +2 +2  Achilles  +1 +1  Plantar  Down Down    No clonus, frontal release signs or other pathologic reflexes present.     COORDINATION: Intact on finger to nose bl, intact on heel to shin bl, LAURIE intact bl  SENSORY: Intact to light touch in BUE and BLE  ROMBERG: Negative  GAIT: Normal stance, cautious walking but with narrow base. Able to perform Toe, heel, and tandem walk        CT head wo IV contrast    Result Date: 7/11/2024  No evidence of acute cortical infarct or intracranial hemorrhage.   No acute fracture or dislocation in the facial and cranial bones.   No acute fracture or subluxation in the cervical spine   Signed by: Chris De Los Santos 7/11/2024 5:59 PM Dictation workstation:   [id number]   MR brain wo IV contrast    Result Date: 9/12/2024  There is no evidence of acute hemorrhage, mass lesion or acute infarction.   I personally reviewed the images/study and I agree with the findings as stated above by resident physician, Dr. Reynold Snider.   MACRO: None   Signed by: Alirio Lindsey 9/12/2024 9:25 AM Dictation workstation:   [id number]   Encounter Date: 07/11/24   ECG 12 lead   Result Value    Ventricular Rate 73    Atrial Rate 73    RI Interval 140    QRS Duration 86    QT Interval 370    QTC Calculation(Bazett) 407    P Axis 46    R Axis -2    T Axis 28    QRS Count 12    Q Onset 207    P Onset 137    P Offset 168  "   T Offset 392    QTC Fredericia 395     No echocardiogram results found for the past 12 months     Lab Results   Component Value Date    CHOL 228 (H) 06/06/2024    TRIG 184 (H) 06/06/2024    HDL 47.9 06/06/2024    CHHDL 4.8 06/06/2024    LDLF 78 07/20/2022    VLDL 37 06/06/2024    NHDL 180 (H) 06/06/2024   No results found for: \"BNP\", \"HGBA1C\"  Lab Results   Component Value Date    GLUCOSE 107 (H) 07/11/2024     07/11/2024    K 3.6 07/11/2024     07/11/2024    CO2 23 (L) 07/11/2024    ANIONGAP 12 07/11/2024    ANIONGAP 14 06/06/2024    BUN 14 07/11/2024    CREATININE 1.00 07/11/2024    GFRMALE >90 04/10/2023    CALCIUM 9.2 07/11/2024    ALBUMIN 4.3 07/11/2024     Lab Results   Component Value Date    CALCIUM 9.2 07/11/2024    PROT 7.0 07/11/2024    ALBUMIN 4.3 07/11/2024    AST 19 07/11/2024    ALT 26 07/11/2024    ALKPHOS 64 07/11/2024    BILITOT 0.7 07/11/2024     Lab Results   Component Value Date    WBC 6.1 07/11/2024    HGB 16.5 07/11/2024    HCT 48.5 07/11/2024    MCV 90 07/11/2024     07/11/2024     INR   Date Value Ref Range Status   04/10/2023 1.1 0.9 - 1.1 Final     Lab Results   Component Value Date    TSH 1.56 06/06/2024       Assessment/Plan   1. Syncope, unspecified syncope type        PLAN   Syncope  -- triggered only by position change from squatting to standing, especially after squatting for a long time. MRI and neuro exam unremarkable. Discussed slowly rising from squatting position. No need for EEG. Pt is cleared to return to work -- pt is to contact office if any need for work letters.     Follow up as needed.     Total time spent: 30min     No orders of the defined types were placed in this encounter.    "

## 2024-09-30 NOTE — LETTER
September 30, 2024     Patient: Cecil Ruiz   YOB: 1975   Date of Visit: 9/30/2024       To Whom It May Concern:    It is my medical opinion that Cecil Ruiz may return to full duty immediately with no restrictions.    If you have any questions or concerns, please don't hesitate to call.         Sincerely,          Roger Mendoza MD  Neurology Clinic  [facility]       CC: No Recipients

## 2024-11-13 ENCOUNTER — PATIENT MESSAGE (OUTPATIENT)
Dept: BEHAVIORAL HEALTH | Facility: CLINIC | Age: 49
End: 2024-11-13
Payer: COMMERCIAL

## 2024-11-13 ENCOUNTER — TELEPHONE (OUTPATIENT)
Dept: BEHAVIORAL HEALTH | Facility: CLINIC | Age: 49
End: 2024-11-13
Payer: COMMERCIAL

## 2024-11-19 ENCOUNTER — TELEPHONE (OUTPATIENT)
Dept: BEHAVIORAL HEALTH | Facility: CLINIC | Age: 49
End: 2024-11-19
Payer: COMMERCIAL

## 2024-12-04 ENCOUNTER — TELEPHONE (OUTPATIENT)
Dept: NEUROLOGY | Facility: CLINIC | Age: 49
End: 2024-12-04
Payer: COMMERCIAL

## 2024-12-04 NOTE — TELEPHONE ENCOUNTER
Left message for patient to return call with correct/updated insurance information. Current aetna and medicaid are both ineligible. No current cards on file

## 2024-12-10 ENCOUNTER — APPOINTMENT (OUTPATIENT)
Dept: BEHAVIORAL HEALTH | Facility: CLINIC | Age: 49
End: 2024-12-10
Payer: COMMERCIAL

## 2024-12-10 DIAGNOSIS — F43.10 PTSD (POST-TRAUMATIC STRESS DISORDER): ICD-10-CM

## 2024-12-10 DIAGNOSIS — F41.8 ANXIETY WITH DEPRESSION: ICD-10-CM

## 2024-12-10 PROCEDURE — 99214 OFFICE O/P EST MOD 30 MIN: CPT | Performed by: PSYCHIATRY & NEUROLOGY

## 2024-12-10 RX ORDER — QUETIAPINE FUMARATE 25 MG/1
TABLET, FILM COATED ORAL
Qty: 30 TABLET | Refills: 2 | Status: SHIPPED | OUTPATIENT
Start: 2024-12-10

## 2024-12-10 RX ORDER — ARIPIPRAZOLE 5 MG/1
5 TABLET ORAL DAILY
Qty: 30 TABLET | Refills: 2 | Status: SHIPPED | OUTPATIENT
Start: 2024-12-10

## 2024-12-10 RX ORDER — VENLAFAXINE HYDROCHLORIDE 150 MG/1
150 CAPSULE, EXTENDED RELEASE ORAL DAILY
Qty: 30 CAPSULE | Refills: 5 | Status: SHIPPED | OUTPATIENT
Start: 2024-12-10

## 2024-12-10 RX ORDER — BUSPIRONE HYDROCHLORIDE 15 MG/1
TABLET ORAL
Qty: 90 TABLET | Refills: 2 | Status: SHIPPED | OUTPATIENT
Start: 2024-12-10

## 2024-12-10 ASSESSMENT — ENCOUNTER SYMPTOMS
HYPERACTIVE: 0
HALLUCINATIONS: 0
PSYCHOMOTOR RETARDATION: 1
PANIC: 1
HOPELESSNESS: 0

## 2024-12-10 NOTE — PROGRESS NOTES
Subjective   Patient ID: Cecil Ruiz is a 49 y.o. male.    HPI  Diagnoses of Anxiety with depression and PTSD (post-traumatic stress disorder) were pertinent to this visit.   Over the last two months-severe stressors: finances, health, primary   relationships;  Presently,  he is financially ok for the next few months,   Waiting on SSD/SSI;  dtr very ill (health, mental health, substance use)  Pt reports:    No etoh;  +THC  Anxiety--  +panic attack-- 7 weeks ago;  used behavioral mgmt -- lasted 40 minutes;  Depression-  Buspirone was helping -- wishes to restart  Discussed titration schedule:  Buspirone 15 mg strength tabs each:  1/2 tab twice daily x 3 days, then 1 tab twice daily x 3 days, then can add 1/2 to full tab for total of 3 tabs daily (each 15 mg) in  2- 3 divided doses per day thereafter as tolerates; Monitor for dizziness;  Review of Systems   Psychiatric/Behavioral:  Negative for behavioral problems, hallucinations, self-injury and suicidal ideas. The patient is not hyperactive.      Psych Review of Symptoms:    Anxiety:     Additional Anxiety Symptoms: Panic attacks.  Panic attacks with worry about having panic attacks.  Manifestations of panic attack include feeling of losing control.       Depressive Symptoms:   Psychomotor retardation. No hopelessness and no suicidal ideation.      Psychotic Symptoms:   No hallucinations.            Objective     Physical Exam  Psychiatric:         Speech: Speech normal.         Behavior: Behavior normal.         Thought Content: Thought content is not paranoid or delusional. Thought content does not include homicidal or suicidal ideation. Thought content does not include homicidal or suicidal plan.         Judgment: Judgment normal.       Mood.: low without SI    Assessment/Plan     Diagnoses and all orders for this visit:  Anxiety with depression  -     Follow Up In Psychiatry; Future  -     busPIRone (Buspar) 15 mg tablet; Take 1 tablet (15 mg) by mouth in  the morning and 1 tablet (15 mg) in the evening and 1 tablet (15 mg) before bedtime.  -     venlafaxine XR (Effexor XR) 150 mg 24 hr capsule; Take 1 capsule (150 mg) by mouth once daily. Do not crush or chew.  -     ARIPiprazole (Abilify) 5 mg tablet; Take 1 tablet (5 mg) by mouth once daily.  PTSD (post-traumatic stress disorder)  -     QUEtiapine (SeroqueL) 25 mg tablet; Take 1 tab at bedtime as needed  Next visit: 2/11/25

## 2024-12-10 NOTE — PATIENT INSTRUCTIONS
When restarting buspirone:  Buspirone 15 mg strength tabs each:  1/2 tab twice daily x 3 days, then 1 tab twice daily x 3 days, then can add 1/2 to full tab for total of 3 tabs daily (each 15 mg) in  2- 3 divided doses per day thereafter as tolerates; Monitor for dizziness;

## 2024-12-16 ENCOUNTER — APPOINTMENT (OUTPATIENT)
Dept: CARDIOLOGY | Facility: CLINIC | Age: 49
End: 2024-12-16
Payer: COMMERCIAL

## 2025-01-13 ENCOUNTER — APPOINTMENT (OUTPATIENT)
Dept: PRIMARY CARE | Facility: CLINIC | Age: 50
End: 2025-01-13
Payer: COMMERCIAL

## 2025-01-13 VITALS
HEIGHT: 69 IN | SYSTOLIC BLOOD PRESSURE: 124 MMHG | WEIGHT: 249 LBS | BODY MASS INDEX: 36.88 KG/M2 | DIASTOLIC BLOOD PRESSURE: 68 MMHG

## 2025-01-13 DIAGNOSIS — F32.A ANXIETY AND DEPRESSION: Primary | ICD-10-CM

## 2025-01-13 DIAGNOSIS — R63.5 WEIGHT GAIN: ICD-10-CM

## 2025-01-13 DIAGNOSIS — Z00.00 ANNUAL PHYSICAL EXAM: ICD-10-CM

## 2025-01-13 DIAGNOSIS — F41.9 ANXIETY AND DEPRESSION: Primary | ICD-10-CM

## 2025-01-13 DIAGNOSIS — R53.83 OTHER FATIGUE: ICD-10-CM

## 2025-01-13 DIAGNOSIS — Z23 NEED FOR TDAP VACCINATION: ICD-10-CM

## 2025-01-13 PROCEDURE — 3008F BODY MASS INDEX DOCD: CPT | Performed by: INTERNAL MEDICINE

## 2025-01-13 PROCEDURE — 90471 IMMUNIZATION ADMIN: CPT | Performed by: INTERNAL MEDICINE

## 2025-01-13 PROCEDURE — 99396 PREV VISIT EST AGE 40-64: CPT | Performed by: INTERNAL MEDICINE

## 2025-01-13 PROCEDURE — 90715 TDAP VACCINE 7 YRS/> IM: CPT | Performed by: INTERNAL MEDICINE

## 2025-01-13 PROCEDURE — 93000 ELECTROCARDIOGRAM COMPLETE: CPT | Performed by: INTERNAL MEDICINE

## 2025-01-13 PROCEDURE — 99213 OFFICE O/P EST LOW 20 MIN: CPT | Performed by: INTERNAL MEDICINE

## 2025-01-14 ENCOUNTER — LAB (OUTPATIENT)
Dept: LAB | Facility: LAB | Age: 50
End: 2025-01-14
Payer: MEDICARE

## 2025-01-14 DIAGNOSIS — Z00.00 ANNUAL PHYSICAL EXAM: ICD-10-CM

## 2025-01-14 DIAGNOSIS — R53.83 OTHER FATIGUE: ICD-10-CM

## 2025-01-14 LAB
ALBUMIN SERPL BCP-MCNC: 4.5 G/DL (ref 3.4–5)
ALP SERPL-CCNC: 59 U/L (ref 33–120)
ALT SERPL W P-5'-P-CCNC: 55 U/L (ref 10–52)
ANION GAP SERPL CALCULATED.3IONS-SCNC: 10 MMOL/L (ref 10–20)
APPEARANCE UR: CLEAR
AST SERPL W P-5'-P-CCNC: 32 U/L (ref 9–39)
BILIRUB SERPL-MCNC: 0.9 MG/DL (ref 0–1.2)
BILIRUB UR STRIP.AUTO-MCNC: NEGATIVE MG/DL
BUN SERPL-MCNC: 15 MG/DL (ref 6–23)
CALCIUM SERPL-MCNC: 9.2 MG/DL (ref 8.6–10.3)
CHLORIDE SERPL-SCNC: 107 MMOL/L (ref 98–107)
CO2 SERPL-SCNC: 27 MMOL/L (ref 21–32)
COLOR UR: YELLOW
CREAT SERPL-MCNC: 0.93 MG/DL (ref 0.5–1.3)
EGFRCR SERPLBLD CKD-EPI 2021: >90 ML/MIN/1.73M*2
ERYTHROCYTE [DISTWIDTH] IN BLOOD BY AUTOMATED COUNT: 12.9 % (ref 11.5–14.5)
GLUCOSE SERPL-MCNC: 84 MG/DL (ref 74–99)
GLUCOSE UR STRIP.AUTO-MCNC: NORMAL MG/DL
HCT VFR BLD AUTO: 48 % (ref 41–52)
HGB BLD-MCNC: 16.2 G/DL (ref 13.5–17.5)
KETONES UR STRIP.AUTO-MCNC: NEGATIVE MG/DL
LEUKOCYTE ESTERASE UR QL STRIP.AUTO: NEGATIVE
MCH RBC QN AUTO: 30.6 PG (ref 26–34)
MCHC RBC AUTO-ENTMCNC: 33.8 G/DL (ref 32–36)
MCV RBC AUTO: 91 FL (ref 80–100)
NITRITE UR QL STRIP.AUTO: NEGATIVE
NRBC BLD-RTO: 0 /100 WBCS (ref 0–0)
PH UR STRIP.AUTO: 6 [PH]
PLATELET # BLD AUTO: 269 X10*3/UL (ref 150–450)
POTASSIUM SERPL-SCNC: 3.8 MMOL/L (ref 3.5–5.3)
PROT SERPL-MCNC: 6.6 G/DL (ref 6.4–8.2)
PROT UR STRIP.AUTO-MCNC: NEGATIVE MG/DL
RBC # BLD AUTO: 5.3 X10*6/UL (ref 4.5–5.9)
RBC # UR STRIP.AUTO: NEGATIVE /UL
SODIUM SERPL-SCNC: 140 MMOL/L (ref 136–145)
SP GR UR STRIP.AUTO: 1.03
TSH SERPL-ACNC: 1.33 MIU/L (ref 0.44–3.98)
UROBILINOGEN UR STRIP.AUTO-MCNC: NORMAL MG/DL
WBC # BLD AUTO: 7.5 X10*3/UL (ref 4.4–11.3)

## 2025-01-14 PROCEDURE — 84443 ASSAY THYROID STIM HORMONE: CPT

## 2025-01-14 PROCEDURE — 85027 COMPLETE CBC AUTOMATED: CPT

## 2025-01-14 PROCEDURE — 81003 URINALYSIS AUTO W/O SCOPE: CPT

## 2025-01-14 PROCEDURE — 80053 COMPREHEN METABOLIC PANEL: CPT

## 2025-01-14 NOTE — PROGRESS NOTES
"Subjective   Patient ID: Cecil Ruiz is a 50 y.o. male who presents for Annual Exam.     Mr. Cecil Ruiz today came here for a physical.  He understands it is a covered benefit though he is on ______ (medical insurance Aetna), but he states that his insurance company called me for a physical.  He had advantage planned.  Overall, he is okay.  He came here for follow-up.  He wants a physical.  Overall, he is keeping well.  No weight gain.    IMMUNIZATION:  Tetanus, we gave it today.    I have personally reviewed the patient's Past Medical History, Medications, Allergies, Social History, and Family History in the EMR.    Review of Systems   All other systems reviewed and are negative.  The patient has never had a heart attack.  No stroke.  No diabetes.  No cancer.    Objective   /68   Ht 1.753 m (5' 9\")   Wt 113 kg (249 lb)   BMI 36.77 kg/m²     Physical Exam  Vitals reviewed.   HENT:      Right Ear: Tympanic membrane, ear canal and external ear normal.      Left Ear: Tympanic membrane, ear canal and external ear normal.   Eyes:      General: No scleral icterus.     Pupils: Pupils are equal, round, and reactive to light.   Neck:      Vascular: No carotid bruit.   Cardiovascular:      Heart sounds: Normal heart sounds, S1 normal and S2 normal. No murmur heard.     No friction rub.   Pulmonary:      Effort: Pulmonary effort is normal.      Breath sounds: Normal breath sounds and air entry.   Abdominal:      Palpations: There is no hepatomegaly, splenomegaly or mass.   Genitourinary:     Comments: Prostate okay.  Musculoskeletal:         General: No swelling or deformity. Normal range of motion.      Cervical back: Neck supple.      Right lower leg: No edema.      Left lower leg: No edema.   Lymphadenopathy:      Cervical: No cervical adenopathy.      Upper Body:      Right upper body: No axillary adenopathy.      Left upper body: No axillary adenopathy.      Lower Body: No right inguinal adenopathy. No " left inguinal adenopathy.   Skin:     Comments: Moles and freckles.   Neurological:      Mental Status: He is oriented to person, place, and time.      Cranial Nerves: Cranial nerves 2-12 are intact. No cranial nerve deficit.      Sensory: No sensory deficit.      Motor: Motor function is intact. No weakness.      Gait: Gait is intact.      Deep Tendon Reflexes: Reflexes normal.   Psychiatric:         Mood and Affect: Mood normal. Mood is not anxious or depressed. Affect is not angry.         Behavior: Behavior is not agitated.         Thought Content: Thought content normal.         Judgment: Judgment normal.     LAB WORK:  Laboratory testing discussed.  EKG done.    Assessment/Plan   Problem List Items Addressed This Visit    None  Visit Diagnoses         Codes    Anxiety and depression    -  Primary F41.9, F32.A    Annual physical exam     Z00.00    Relevant Orders    ECG 12 lead (Clinic Performed)    CBC    Comprehensive Metabolic Panel    Thyroid Stimulating Hormone    Urinalysis with Reflex Microscopic    Lipid Panel    Need for Tdap vaccination     Z23    Relevant Orders    Tdap vaccine, age 7 years and older  (BOOSTRIX) (Completed)    Other fatigue     R53.83    Relevant Orders    CBC    Weight gain     R63.5        1. Essentially normal physical.  2. EKG done, okay.  3. Tetanus shot given.  4. Anxiety and depression.  Sees psychiatrist.  5. Weight gain.  Complete blood work ordered.  6. Skin.  Moles and freckles.  Sees dermatologist.  7. I shall see him back in a week after the test.    Scribe Attestation  By signing my name below, IYolanda Scribe attest that this documentation has been prepared under the direction and in the presence of Aldo Forrester MD.   All medical record entries made by the scribe were personally dictated by me I have reviewed the chart and agree the record accurately reflects my personal performance of his history physical examination and management

## 2025-01-22 DIAGNOSIS — M79.673 PAIN OF FOOT, UNSPECIFIED LATERALITY: ICD-10-CM

## 2025-01-27 NOTE — PATIENT INSTRUCTIONS
-Try Voltaren gel on the area of pain 4 times a day for 2 weeks straight  -Do not walk barefoot at all during the next few weeks, you can use arch support braces from [employer]  -Roll your foot over a golf ball or a frozen bottle of water throughout the day for the next few weeks  -Some plantar fasciitis exercises provided  -Consider plantar fasciitis resting night brace  -Left foot x-ray ordered  -Consider other medications in the future  -Consider knee gel injections, repeat pes anserine bursitis injection, trigger point injections for your back  -Consider referral to rheumatology in the future  -Continue daily home exercises, dynamic active strengthening focusing on your core exercises  -Consider lumbar MRI, knee MRI in the future  -Follow-up 4 to 6 weeks ultrasound appointment and we can consider a left plantar fasciitis ultrasound-guided injection.  If you are feeling better, you can cancel this appointment

## 2025-01-28 ENCOUNTER — APPOINTMENT (OUTPATIENT)
Dept: PHYSICAL MEDICINE AND REHAB | Facility: CLINIC | Age: 50
End: 2025-01-28
Payer: MEDICARE

## 2025-01-28 ENCOUNTER — HOSPITAL ENCOUNTER (OUTPATIENT)
Dept: RADIOLOGY | Facility: HOSPITAL | Age: 50
Discharge: HOME | End: 2025-01-28
Payer: MEDICARE

## 2025-01-28 VITALS
WEIGHT: 247 LBS | DIASTOLIC BLOOD PRESSURE: 77 MMHG | HEART RATE: 76 BPM | BODY MASS INDEX: 36.58 KG/M2 | TEMPERATURE: 96.9 F | SYSTOLIC BLOOD PRESSURE: 117 MMHG | HEIGHT: 69 IN | OXYGEN SATURATION: 94 %

## 2025-01-28 DIAGNOSIS — M54.2 NECK PAIN: ICD-10-CM

## 2025-01-28 DIAGNOSIS — G89.29 CHRONIC PAIN OF RIGHT KNEE: Primary | ICD-10-CM

## 2025-01-28 DIAGNOSIS — M54.50 CHRONIC LOW BACK PAIN, UNSPECIFIED BACK PAIN LATERALITY, UNSPECIFIED WHETHER SCIATICA PRESENT: ICD-10-CM

## 2025-01-28 DIAGNOSIS — G89.29 CHRONIC LOW BACK PAIN, UNSPECIFIED BACK PAIN LATERALITY, UNSPECIFIED WHETHER SCIATICA PRESENT: ICD-10-CM

## 2025-01-28 DIAGNOSIS — G89.29 CHRONIC PAIN OF BOTH SHOULDERS: ICD-10-CM

## 2025-01-28 DIAGNOSIS — M70.51 PES ANSERINUS BURSITIS OF RIGHT KNEE: ICD-10-CM

## 2025-01-28 DIAGNOSIS — M25.511 CHRONIC PAIN OF BOTH SHOULDERS: ICD-10-CM

## 2025-01-28 DIAGNOSIS — M72.2 PLANTAR FASCIITIS OF LEFT FOOT: ICD-10-CM

## 2025-01-28 DIAGNOSIS — M79.18 MYOFASCIAL PAIN SYNDROME: ICD-10-CM

## 2025-01-28 DIAGNOSIS — M17.31 POST-TRAUMATIC OSTEOARTHRITIS OF RIGHT KNEE: ICD-10-CM

## 2025-01-28 DIAGNOSIS — M25.561 CHRONIC PAIN OF RIGHT KNEE: Primary | ICD-10-CM

## 2025-01-28 DIAGNOSIS — M25.512 CHRONIC PAIN OF BOTH SHOULDERS: ICD-10-CM

## 2025-01-28 PROCEDURE — 3008F BODY MASS INDEX DOCD: CPT | Performed by: PHYSICAL MEDICINE & REHABILITATION

## 2025-01-28 PROCEDURE — 99214 OFFICE O/P EST MOD 30 MIN: CPT | Performed by: PHYSICAL MEDICINE & REHABILITATION

## 2025-01-28 PROCEDURE — G2211 COMPLEX E/M VISIT ADD ON: HCPCS | Performed by: PHYSICAL MEDICINE & REHABILITATION

## 2025-01-28 PROCEDURE — 73630 X-RAY EXAM OF FOOT: CPT | Mod: LT

## 2025-01-28 PROCEDURE — 73630 X-RAY EXAM OF FOOT: CPT | Mod: LEFT SIDE | Performed by: RADIOLOGY

## 2025-01-28 ASSESSMENT — PAIN SCALES - GENERAL: PAINLEVEL_OUTOF10: 4

## 2025-01-30 ENCOUNTER — TELEPHONE (OUTPATIENT)
Dept: PHYSICAL MEDICINE AND REHAB | Facility: CLINIC | Age: 50
End: 2025-01-30
Payer: MEDICARE

## 2025-01-30 NOTE — TELEPHONE ENCOUNTER
----- Message from Yolanda Naidu sent at 1/30/2025  8:36 AM EST -----  Please let him know that his foot x-ray showed a small heel spur as expected.  He should continue with our plan, and I will see him in follow-up.  Thank you  ----- Message -----  From: Interface, Radiology Results In  Sent: 1/29/2025   5:39 PM EST  To: Yolanda Naidu MD

## 2025-02-11 ENCOUNTER — APPOINTMENT (OUTPATIENT)
Dept: BEHAVIORAL HEALTH | Facility: CLINIC | Age: 50
End: 2025-02-11
Payer: COMMERCIAL

## 2025-02-11 ENCOUNTER — TELEPHONE (OUTPATIENT)
Dept: PRIMARY CARE | Facility: CLINIC | Age: 50
End: 2025-02-11

## 2025-02-11 DIAGNOSIS — F41.8 ANXIETY WITH DEPRESSION: ICD-10-CM

## 2025-02-11 DIAGNOSIS — F43.10 PTSD (POST-TRAUMATIC STRESS DISORDER): ICD-10-CM

## 2025-02-11 PROCEDURE — 99214 OFFICE O/P EST MOD 30 MIN: CPT | Performed by: PSYCHIATRY & NEUROLOGY

## 2025-02-11 NOTE — PROGRESS NOTES
Subjective   Patient ID: Cecil Ruiz is a 50 y.o. male.    HPI  Pt is treated for polysubstance use d/o-- denies use/relapse of substances;  Some notable improvement of stressors;  Pt is adherent to medications and denies s.e.  Meds:  Effexor xr, seroquel, buspar, abilify;  Recently recorded:   Most Recent Value 1/28/2025  0959 1/13/2025  1513   BP: 117/77  as of 1/28/2025 117/77 124/68   Heart Rate: 76  as of 1/28/2025 76       Latest Reference Range & Units Most Recent   GLUCOSE 74 - 99 mg/dL 84  1/14/25 14:27   SODIUM 136 - 145 mmol/L 140  1/14/25 14:27   POTASSIUM 3.5 - 5.3 mmol/L 3.8  1/14/25 14:27   CHLORIDE 98 - 107 mmol/L 107  1/14/25 14:27   Bicarbonate 21 - 32 mmol/L 27  1/14/25 14:27   Anion Gap 10 - 20 mmol/L 10  1/14/25 14:27   Blood Urea Nitrogen 6 - 23 mg/dL 15  1/14/25 14:27   Creatinine 0.50 - 1.30 mg/dL 0.93  1/14/25 14:27   EGFR >60 mL/min/1.73m*2 >90  1/14/25 14:27   Calcium 8.6 - 10.3 mg/dL 9.2  1/14/25 14:27   Albumin 3.4 - 5.0 g/dL 4.5  1/14/25 14:27   Alkaline Phosphatase 33 - 120 U/L 59  1/14/25 14:27   ALT 10 - 52 U/L 55 (H)  1/14/25 14:27   AST 9 - 39 U/L 32  1/14/25 14:27   Bilirubin Total 0.0 - 1.2 mg/dL 0.9  1/14/25 14:27   (H): Data is abnormally high     Latest Reference Range & Units Most Recent   HDL CHOLESTEROL mg/dL 47.9  6/6/24 15:12   Cholesterol/HDL Ratio  4.8  6/6/24 15:12   LDL Calculated <=99 mg/dL 143 (H)  6/6/24 15:12   VLDL 0 - 40 mg/dL 37  6/6/24 15:12   TRIGLYCERIDES 0 - 149 mg/dL 184 (H)  6/6/24 15:12   Non HDL Cholesterol 0 - 149 mg/dL 180 (H)  6/6/24 15:12   AMYLASE 29 - 103 U/L 68  6/9/21 11:25   Total Protein 6.4 - 8.2 g/dL 6.6  1/14/25 14:27   MAGNESIUM 1.60 - 3.10 mg/dL 2.10  7/11/24 15:57   CHOLESTEROL 0 - 199 mg/dL 228 (H)  6/6/24 15:12   (H): Data is abnormally high    Review of Systems   Psychiatric/Behavioral:  Negative for dysphoric mood, hallucinations and suicidal ideas. The patient is not nervous/anxious.        Objective   Physical  Exam  Psychiatric:         Attention and Perception: Attention and perception normal.         Mood and Affect: Mood and affect normal.         Speech: Speech normal.         Thought Content: Thought content is not paranoid or delusional. Thought content does not include homicidal or suicidal ideation. Thought content does not include homicidal or suicidal plan.         Judgment: Judgment normal.         Assessment/Plan   Diagnoses and all orders for this visit:  Anxiety with depression  -     Referral to Social Work  -     Follow Up In Psychiatry; Future  -     ARIPiprazole (Abilify) 5 mg tablet; Take 1 tablet (5 mg) by mouth once daily.  -     busPIRone (Buspar) 15 mg tablet; Take 1 tablet (15 mg) by mouth in the morning and 1 tablet (15 mg) in the evening and 1 tablet (15 mg) before bedtime.  -     venlafaxine XR (Effexor XR) 150 mg 24 hr capsule; Take 1 capsule (150 mg) by mouth once daily. Do not crush or chew.  PTSD (post-traumatic stress disorder)  -     QUEtiapine (SeroqueL) 25 mg tablet; Take 1 tab at bedtime as needed

## 2025-02-20 RX ORDER — QUETIAPINE FUMARATE 25 MG/1
TABLET, FILM COATED ORAL
Qty: 30 TABLET | Refills: 3 | Status: SHIPPED | OUTPATIENT
Start: 2025-02-20

## 2025-02-20 RX ORDER — VENLAFAXINE HYDROCHLORIDE 150 MG/1
150 CAPSULE, EXTENDED RELEASE ORAL DAILY
Qty: 30 CAPSULE | Refills: 5 | Status: SHIPPED | OUTPATIENT
Start: 2025-02-20

## 2025-02-20 RX ORDER — ARIPIPRAZOLE 5 MG/1
5 TABLET ORAL DAILY
Qty: 30 TABLET | Refills: 3 | Status: SHIPPED | OUTPATIENT
Start: 2025-02-20

## 2025-02-20 RX ORDER — BUSPIRONE HYDROCHLORIDE 15 MG/1
TABLET ORAL
Qty: 90 TABLET | Refills: 3 | Status: SHIPPED | OUTPATIENT
Start: 2025-02-20

## 2025-02-20 ASSESSMENT — ENCOUNTER SYMPTOMS
DYSPHORIC MOOD: 0
HALLUCINATIONS: 0
NERVOUS/ANXIOUS: 0

## 2025-02-24 NOTE — PATIENT INSTRUCTIONS
-Ice on and off for the next 24 hours if injection sites are sore. Do gentle range of motion exercises in each area that was injected. Try to do them every hour for about half a minute or so, in every direction that the affected part goes. No pool, bath, or hot tub today. Avoid heavy lifting for the next 2 days.    -Continue Voltaren gel on the area of pain 4 times a day as needed  -Do not walk barefoot at all   -Continue to Roll your foot over a golf ball or a frozen bottle of water throughout the day  -Continue plantar fasciitis exercises and resting night brace  -Consider other medications in the future  -Consider knee gel injections, repeat pes anserine bursitis injection, trigger point injections for your back  -Consider referral to rheumatology in the future  -Continue daily home exercises, dynamic active strengthening focusing on your core exercises  -Consider lumbar MRI, knee MRI in the future  -Follow-up as needed

## 2025-02-24 NOTE — PROGRESS NOTES
Chief complaint:  right knee pain. Follow up    This s a pleasant 50-year-old right-handed man with past medical history of HTN, HLD, anxiety/depression, right tibial fracture, and chronic knee pain who presents for follow up of  right knee pain.    He was last seen here on 1/28/2025, at which point we talked about his left plantar fasciitis.  I gave him options for conservative management including Voltaren gel, not walking barefoot, rolling his foot over a golf ball or frozen bottle and I gave him some exercises.  While this has helped significantly, especially the night brace, he still would like an ultrasound-guided left plantar fasciitis injection today.    I ordered an x-ray and this was done on 1/20/2025, images and report personally reviewed interpreted today and discussed with the patient.  There was a small left plantar calcaneal spur    === 01/28/25 ===  XR FOOT 3+ VIEWS LEFT  - Impression -  Small left plantar calcaneal spur similar to prior study. No acute  findings.       He would like to do an ultrasound-guided left plantar fasciitis injection.    He rates his pain as a 3/10, previously 4/10 in his left heel    Otherwise, there have been no changes to medications or past medical history since the last visit.      __________________________  3/19/24: Right ultrasound-guided pes anserinus bursitis injection, try Voltaren gel, consider other medication injections, FMLA paperwork filled out again, proceed with physical therapy and daily home exercises  5/8/2024: Wean off Topamax as tolerated, continue PT and home exercises, FMLA paperwork filled out again   6/26/2024: Patellofemoral exercises provided, try Voltaren gel, consider other medications and injections, FMLA paperwork filled out  9/25/2024: Continue Voltaren gel as needed, continue exercises, work paperwork filled out, follow-up as needed  1/28/2025: Plantar fasciitis recommendations, left foot x-ray, do core exercises, consider ultrasound-guided  plantar fasciitis  injection  2/25/2025: Left ultrasound-guided plantar fasciitis injection, continue same as above for plantar fasciitis, follow-up as needed  _________________________  As a reminder:    TIMELINE OF COMPLAINT(S):  Right knee arthritis and chronic knee pain after a traumatic tibial fracture in 2007 and surgical repair. Patient states knee started hurting near hardware 2 years post surgery. Meniscus torn September 2021, Dr. Saavedra performed laparoscopic meniscus repair. He has not been able to work since 2021.  He is s/p genicular RFA in September 2023 which did not help. He says he is ready for a knee replacement but 3-4 orthopedic surgeons say he is not ready due to age and radiographs. He has history of cocaine use, sober since 7/2023. He states he was started on topiramate 3 months ago and developed a rash on his face 1 month ago.  Patient also reports low back pain that is long standing but would like to focus on his knee pain at today's visit.    -He has some occasional urge bowel incontinence, denies any urine ncontinence or saddle anesthesia.    Pain:  LOCATION- medial right knee pointing to the pes anserinus area  RADIATION- no  ASSOCIATED WITH- [place]  NUMBNESS/TINGLING- No  WEAKNESS- No  CONSTANT or INTERMITTENT- Constant  SEVERITY/QUANTITY- 3  QUALITY- Achy, burning along scars  EXACERBATED BY- Stairs, inclines, squats  BETTER WITH- Ice  TRIED- Ibuprofen, Topiramate      Anti-Inflammatories: ketorolac (didn't help), prednisone, ibuprofen, meloxicam, never tried Voltaren gel      Muscle relaxants: clonazepam (for panic), cyclobenzaprine, (doesn't remember).      Anti-depressants: multiple over years, no SNRI or TCA, currently on Abilify, Lexapro, and Buspar      Neuroleptics: Topiramate helped significantly, gabapentin (300mg TID),       LDN:    PHYSICAL THERAPY: Yes, 1-2yrs ago.  No home exercises  TENS unit: No  CHIROPRACTIC MANIPULATION: No  ACUPUNCTURE TREATMENTS: No  DEEP TISSUE  "MASSAGE THERAPY: No  OSTEOPATHIC MANIPULATION THERAPY: No  INJECTIONS: Previous cortisone and \"cortisone like injection\" did not help.  He does not think he had any hyaluronic acid injections, genicular nerve block 8/2023 (seemed to help), RFA 9/2023 (didn't help)  EMG/NCS: No    IMAGING: Yes    === 05/04/23 ===  XR KNEE COMPLETE 4 OR MORE VIEWS  Surgical plate and numerous surgical screws are again seen throughout  the proximal right tibia. Redemonstrated plateau fracture is  unchanged in appearance compared to prior imaging. No new displaced  fractures are evident. Normal bone mineralization. No sizable  suprapatellar joint effusion of the right knee.  - Impression -  Postsurgical changes of the proximal right tibia without significant  change compared to prior imaging.    XR R Knee 5/31/23  Postsurgical changes ORIF right proximal tibia with multiple intact   surgical screws.  There is mild osteoarthritis lateral compartment with   joint space narrowing and small marginal osteophytes.  Small   patellofemoral osteophytes are     Lumbar xray 7/28/22:  IMPRESSION:  1. Mild L5-S1 facet joint degenerative changes. Otherwise  unremarkable lumbar spine radiographs.    Lumbar MRI  9/14/2012:  IMPRESSION:       Minimal disc bulge at L4-L5 with slight central canal and  neural foramen narrowing.  Small midline herniation at L5-S1 causing minimal thecal sac indentation.     === 01/28/25 ===  XR FOOT 3+ VIEWS LEFT  - Impression -  Small left plantar calcaneal spur similar to prior study. No acute  findings.     FUNCTIONAL HISTORY: The patient is independent in all ADLs, mobility, and driving. The patient does use a cane for long distances only.    SH:  Lives in: [place]   Lives with: Daughter  Occupation: [employer]  Tobacco: Former, quit 2010. One pack daily  Alcohol: \"lots\"  Drugs: Former, quit cocaine in July 2023    ____________________________  ROS: The patient denies any bowel or bladder incontinence/accidents, night sweats, " fevers, chills, recent significant weight loss. A 14 point review of systems was reviewed with the patient and is as above and otherwise negative.  ROS questionnaire positive for depression, anxiety, sleep disturbances    PHYSICAL EXAM    GEN - Alert, well-developed, well-nourished, no acute distress  PSYCH - Cooperative, appropriate mood and affect  HEENT - NC/AT, erythematous rash to bridge of nose, legs.  RESP - Non-labored respirations, equal expansion  CV - warm and well-perfused, No cyanosis or edema in extremities.   BD- soft, ND, overweight  SKIN -no rash    Significant tenderness over the mid point of the left heel, no abnormalities palpated, perhaps there is some swelling there.  No tenderness of the plantar fascia at its origin or midfoot.    Previous:    Right knee: 15cm surgical scar diagonally medial knee and 4cm vertical scar laterally, both tender to palpation. No effusion, or erythema, some warmth noted, multiple small scabs on knee, one bleeding.  No popliteal fullness.  No anterior, posterior, medial or lateral instability.  Significant tenderness over the right pes anserine reminiscent of his pain..  There is no significant pain with hyperflexion of the knee.  There is no pain with varus or valgus stressing of the knee during flexion and extension.  There is no crepitus.  There is medial joint line tenderness. Patellar grind negative.  Left knee: normal exam.    NEURO -   LE strength 5/5 -  including hip flexors, knee flexors, knee extensors, ankle dorsiflexors, ankle plantar flexors, and EHL   Sensation - intact to light touch in bilateral lower extremities except for decreased to light touch right anterior knee both medial and lateral.  Reflexes - 3+ patellar and 2+ Achilles reflexes bilaterally.  1 or 2 nonsustained beats of clonus bilaterally, normal reflexes in the upper extremities, negative Estefania's, No Babinski.  GAIT - Normal base, normal stride length, antalgic.  Slow able to toe walk  and heel walk without difficulty but with pain. Able to perform tandem gait without difficulty but painful.     PROCEDURE:    Lidocaine 1%- 1 cc's used, 0 cc's wasted  200mg/20mL (10mg/mL)  NDC 1905-9957-67  LOT [id number]  EXP 01/31/2026  Manuf: [employer]    Kenalog 40- 1 cc's used, 0 cc's wasted  NDC 1804-2135-30  LOT [id number]  EXP 12/2025  Manuf: [employer]      Procedure: left ultrasound-guided plantar fascia corticosteroid injections  Indication for procedure: L foot pain, plantar fasciitis      Procedure performed by: Yolanda Naidu M.D.    Informed consent obtained. Site confirmed by patient. Time out taken.  Ultrasound transmission gel applied and ultrasound images obtained of pre-injection site.    Please see saved images in the ultrasound tablet    Longitudinal and transverse views of left plantar fascia origins, calcaneus clearly visualized and marked.      Site prepped sterile with povidone-iodine. Ethyl chloride spray used to anesthetize the skin.  1 cc of 40 mg Kenalog, 1cc of 1% lidocaine injected into left medial plantar fascia under direct ultrasound guidance with 27-gauge 1.5 inch needle, location of medication confirmed by ultrasound in correct site.    Plan:    1. The patient was instructed in post-procedural care.   2. The patient was asked to apply moist heat and or ice for the next 24 hours and to perform daily gentle stretching exercises.     Physical Exam  Musculoskeletal:        Feet:           IMPRESSION:    This  is a pleasant 50-year-old right-handed man with past medical history of HTN, anxiety/depression, right tibial fracture, and chronic knee pain who presents for follow-up of right knee pain.  Physical exam is reassuring for lack of neurologic compromise, there is some hyperreflexia in bilateral lower extremities.  Symptoms of physical exam findings consistent with pes anserinus bursitis rather than internal knee derangement.      Left heel spur,?  Plantar fasciitis    -Left  ultrasound-guided plantar fasciitis injection performed as above.  There were no complications and he tolerated the procedure well.  He was provided with postprocedure instructions.  -Continue Voltaren gel on the area of pain 4 times a day as needed  -Do not walk barefoot at all   -Continue to Roll your foot over a golf ball or a frozen bottle of water throughout the day  -Continue plantar fasciitis exercises and resting night brace  -Consider other medications in the future  -Consider knee gel injections, repeat pes anserine bursitis injection, trigger point injections for your back  -Consider referral to rheumatology in the future  -Continue daily home exercises, dynamic active strengthening focusing on your core exercises  -Consider lumbar MRI, knee MRI in the future  -Follow-up as needed       The patient expressed understanding and agreement with the assessment and plan. Patient encouraged to contact us should they have any questions, concerns, or any changes in symptoms.      Thank you for allowing me to participate in the care of your patient.       ** Dictated with voice recognition software, please forgive any errors in grammar and/or spelling **

## 2025-02-25 ENCOUNTER — HOSPITAL ENCOUNTER (OUTPATIENT)
Dept: RADIOLOGY | Facility: EXTERNAL LOCATION | Age: 50
Discharge: HOME | End: 2025-02-25

## 2025-02-25 ENCOUNTER — APPOINTMENT (OUTPATIENT)
Dept: PHYSICAL MEDICINE AND REHAB | Facility: CLINIC | Age: 50
End: 2025-02-25
Payer: MEDICARE

## 2025-02-25 VITALS
WEIGHT: 246 LBS | TEMPERATURE: 96 F | HEIGHT: 69 IN | DIASTOLIC BLOOD PRESSURE: 95 MMHG | HEART RATE: 100 BPM | BODY MASS INDEX: 36.43 KG/M2 | SYSTOLIC BLOOD PRESSURE: 127 MMHG | OXYGEN SATURATION: 94 %

## 2025-02-25 DIAGNOSIS — G89.29 CHRONIC LOW BACK PAIN, UNSPECIFIED BACK PAIN LATERALITY, UNSPECIFIED WHETHER SCIATICA PRESENT: ICD-10-CM

## 2025-02-25 DIAGNOSIS — M54.2 NECK PAIN: ICD-10-CM

## 2025-02-25 DIAGNOSIS — M54.50 CHRONIC LOW BACK PAIN, UNSPECIFIED BACK PAIN LATERALITY, UNSPECIFIED WHETHER SCIATICA PRESENT: ICD-10-CM

## 2025-02-25 DIAGNOSIS — M79.18 MYOFASCIAL PAIN SYNDROME: ICD-10-CM

## 2025-02-25 DIAGNOSIS — M25.512 CHRONIC PAIN OF BOTH SHOULDERS: ICD-10-CM

## 2025-02-25 DIAGNOSIS — M17.31 POST-TRAUMATIC OSTEOARTHRITIS OF RIGHT KNEE: ICD-10-CM

## 2025-02-25 DIAGNOSIS — M72.2 PLANTAR FASCIITIS OF LEFT FOOT: Primary | ICD-10-CM

## 2025-02-25 DIAGNOSIS — M25.511 CHRONIC PAIN OF BOTH SHOULDERS: ICD-10-CM

## 2025-02-25 DIAGNOSIS — M70.51 PES ANSERINUS BURSITIS OF RIGHT KNEE: ICD-10-CM

## 2025-02-25 DIAGNOSIS — M25.561 CHRONIC PAIN OF RIGHT KNEE: ICD-10-CM

## 2025-02-25 DIAGNOSIS — G89.29 CHRONIC PAIN OF BOTH SHOULDERS: ICD-10-CM

## 2025-02-25 DIAGNOSIS — G89.29 CHRONIC PAIN OF RIGHT KNEE: ICD-10-CM

## 2025-02-25 PROCEDURE — 76942 ECHO GUIDE FOR BIOPSY: CPT | Performed by: PHYSICAL MEDICINE & REHABILITATION

## 2025-02-25 PROCEDURE — 20550 NJX 1 TENDON SHEATH/LIGAMENT: CPT | Performed by: PHYSICAL MEDICINE & REHABILITATION

## 2025-02-25 RX ORDER — TRIAMCINOLONE ACETONIDE 40 MG/ML
40 INJECTION, SUSPENSION INTRA-ARTICULAR; INTRAMUSCULAR ONCE
Status: COMPLETED | OUTPATIENT
Start: 2025-02-25 | End: 2025-02-25

## 2025-02-25 RX ADMIN — TRIAMCINOLONE ACETONIDE 40 MG: 40 INJECTION, SUSPENSION INTRA-ARTICULAR; INTRAMUSCULAR at 14:07

## 2025-02-25 ASSESSMENT — PAIN SCALES - GENERAL: PAINLEVEL_OUTOF10: 3

## 2025-03-05 ENCOUNTER — APPOINTMENT (OUTPATIENT)
Dept: PODIATRY | Facility: CLINIC | Age: 50
End: 2025-03-05
Payer: MEDICARE

## 2025-03-24 NOTE — PROGRESS NOTES
Chief complaint:  right knee pain, L heel pain. Follow up    This s a pleasant 50-year-old right-handed man with past medical history of HTN, HLD, anxiety/depression, right tibial fracture, and chronic knee pain who presents for follow up of L heel pain, right knee pain.    He was last seen here on 2/25/2025, at which point I did an ultrasound guided L plantar fasciitis injection.  Present for about 3 weeks before slowly started coming back.    I advised to continue Voltaren gel, night resting splint, not walking barefoot, rolling his foot over a golf ball or frozen bottle and I gave him some exercises.  These things do help, but not completely.  He is interested in speaking to a surgeon about removal of a heel spur.  Voltaren gel does not help too much.    He rates his pain as a 2/10, previously 3/10 in his left heel    Otherwise, there have been no changes to medications or past medical history since the last visit.      __________________________  3/19/24: Right ultrasound-guided pes anserinus bursitis injection, try Voltaren gel, consider other medication injections, FMLA paperwork filled out again, proceed with physical therapy and daily home exercises  5/8/2024: Wean off Topamax as tolerated, continue PT and home exercises, FMLA paperwork filled out again   6/26/2024: Patellofemoral exercises provided, try Voltaren gel, consider other medications and injections, FMLA paperwork filled out  9/25/2024: Continue Voltaren gel as needed, continue exercises, work paperwork filled out, follow-up as needed  1/28/2025: Plantar fasciitis recommendations, left foot x-ray, do core exercises, consider ultrasound-guided plantar fasciitis injection  2/25/2025: Left ultrasound-guided plantar fasciitis injection, continue same as above for plantar fasciitis, follow-up as needed  3/25/25: Continue with plantar fasciitis recommendations, start referral to orthopedic surgery, follow-up for repeat injection in 2 months, consider  "referral for Tenex  _________________________  As a reminder:    TIMELINE OF COMPLAINT(S):  Right knee arthritis and chronic knee pain after a traumatic tibial fracture in 2007 and surgical repair. Patient states knee started hurting near hardware 2 years post surgery. Meniscus torn September 2021, Dr. Saavedra performed laparoscopic meniscus repair. He has not been able to work since 2021.  He is s/p genicular RFA in September 2023 which did not help. He says he is ready for a knee replacement but 3-4 orthopedic surgeons say he is not ready due to age and radiographs. He has history of cocaine use, sober since 7/2023. He states he was started on topiramate 3 months ago and developed a rash on his face 1 month ago.  Patient also reports low back pain that is long standing but would like to focus on his knee pain at today's visit.    -He has some occasional urge bowel incontinence, denies any urine ncontinence or saddle anesthesia.    Pain:  LOCATION- medial right knee pointing to the pes anserinus area  RADIATION- no  ASSOCIATED WITH- Buckling  NUMBNESS/TINGLING- No  WEAKNESS- No  CONSTANT or INTERMITTENT- Constant  SEVERITY/QUANTITY- 3  QUALITY- Achy, burning along scars  EXACERBATED BY- Stairs, inclines, squats  BETTER WITH- Ice  TRIED- Ibuprofen, Topiramate      Anti-Inflammatories: ketorolac (didn't help), prednisone, ibuprofen, meloxicam, never tried Voltaren gel      Muscle relaxants: clonazepam (for panic), cyclobenzaprine, (doesn't remember).      Anti-depressants: multiple over years, no SNRI or TCA, currently on Abilify, Lexapro, and Buspar      Neuroleptics: Topiramate helped significantly, gabapentin (300mg TID),       LDN:    PHYSICAL THERAPY: Yes, 1-2yrs ago.  No home exercises  TENS unit: No  CHIROPRACTIC MANIPULATION: No  ACUPUNCTURE TREATMENTS: No  DEEP TISSUE MASSAGE THERAPY: No  OSTEOPATHIC MANIPULATION THERAPY: No  INJECTIONS: Previous cortisone and \"cortisone like injection\" did not help.  He does " "not think he had any hyaluronic acid injections, genicular nerve block 8/2023 (seemed to help), RFA 9/2023 (didn't help)  EMG/NCS: No    IMAGING: Yes    === 05/04/23 ===  XR KNEE COMPLETE 4 OR MORE VIEWS  Surgical plate and numerous surgical screws are again seen throughout  the proximal right tibia. Redemonstrated plateau fracture is  unchanged in appearance compared to prior imaging. No new displaced  fractures are evident. Normal bone mineralization. No sizable  suprapatellar joint effusion of the right knee.  - Impression -  Postsurgical changes of the proximal right tibia without significant  change compared to prior imaging.    XR R Knee 5/31/23  Postsurgical changes ORIF right proximal tibia with multiple intact   surgical screws.  There is mild osteoarthritis lateral compartment with   joint space narrowing and small marginal osteophytes.  Small   patellofemoral osteophytes are     Lumbar xray 7/28/22:  IMPRESSION:  1. Mild L5-S1 facet joint degenerative changes. Otherwise  unremarkable lumbar spine radiographs.    Lumbar MRI  9/14/2012:  IMPRESSION:       Minimal disc bulge at L4-L5 with slight central canal and  neural foramen narrowing.  Small midline herniation at L5-S1 causing minimal thecal sac indentation.     === 01/28/25 ===  XR FOOT 3+ VIEWS LEFT  - Impression -  Small left plantar calcaneal spur similar to prior study. No acute  findings.     FUNCTIONAL HISTORY: The patient is independent in all ADLs, mobility, and driving. The patient does use a cane for long distances only.    SH:  Lives in: [facility] with: Daughter  Occupation: [employer]  Tobacco: Former, quit 2010. One pack daily  Alcohol: \"lots\"  Drugs: Former, quit cocaine in July 2023    ____________________________  ROS: The patient denies any bowel or bladder incontinence/accidents, night sweats, fevers, chills, recent significant weight loss. A 14 point review of systems was reviewed with the patient and is as above and otherwise " negative.  ROS questionnaire positive for depression, anxiety, sleep disturbances    PHYSICAL EXAM    GEN - Alert, well-developed, well-nourished, no acute distress  PSYCH - Cooperative, appropriate mood and affect  HEENT - NC/AT, erythematous rash to bridge of nose, legs.  RESP - Non-labored respirations, equal expansion  CV - warm and well-perfused, No cyanosis or edema in extremities.   BD- soft, ND, overweight  SKIN -no rash    Significant tenderness over the mid point of the left heel, no abnormalities palpated, perhaps there is some swelling there.  No tenderness of the plantar fascia at its origin or midfoot.    Previous:    Right knee: 15cm surgical scar diagonally medial knee and 4cm vertical scar laterally, both tender to palpation. No effusion, or erythema, some warmth noted, multiple small scabs on knee, one bleeding.  No popliteal fullness.  No anterior, posterior, medial or lateral instability.  Significant tenderness over the right pes anserine reminiscent of his pain..  There is no significant pain with hyperflexion of the knee.  There is no pain with varus or valgus stressing of the knee during flexion and extension.  There is no crepitus.  There is medial joint line tenderness. Patellar grind negative.  Left knee: normal exam.    NEURO -   LE strength 5/5 -  including hip flexors, knee flexors, knee extensors, ankle dorsiflexors, ankle plantar flexors, and EHL   Sensation - intact to light touch in bilateral lower extremities except for decreased to light touch right anterior knee both medial and lateral.  Reflexes - 3+ patellar and 2+ Achilles reflexes bilaterally.  1 or 2 nonsustained beats of clonus bilaterally, normal reflexes in the upper extremities, negative Estefania's, No Babinski.  GAIT - Normal base, normal stride length, antalgic.  Slow able to toe walk and heel walk without difficulty but with pain. Able to perform tandem gait without difficulty but painful.        IMPRESSION:    This   is a pleasant 50-year-old right-handed man with past medical history of HTN, anxiety/depression, right tibial fracture, and chronic knee pain who presents for follow-up of right knee pain.  Physical exam is reassuring for lack of neurologic compromise, there is some hyperreflexia in bilateral lower extremities.  Symptoms of physical exam findings consistent with pes anserinus bursitis rather than internal knee derangement.      Left heel spur,  Plantar fasciitis    -Continue Voltaren gel on the area of pain 4 times a day as needed  -Do not walk barefoot at all   -Continue to Roll your foot over a golf ball or a frozen bottle of water throughout the day  -Continue plantar fasciitis exercises and resting night brace, this is the most important  -Referral to orthopedics provided, Dr. Luz, Dr. Sujey Dr, [place]  -Consider other medications in the future  -Consider knee gel injections, repeat pes anserine bursitis injection, trigger point injections for your back  -Consider referral to rheumatology in the future  -Continue daily home exercises, dynamic active strengthening focusing on your core exercises  -Consider lumbar MRI, knee MRI in the future  -Go to [employer] [phone]  -Consider referral for Tenex procedure  -Follow-up 2 months ultrasound guided appointment for repeat L plantar fasciitis injection. If you are feeling better or planning on surgery, you can cancel.       The patient expressed understanding and agreement with the assessment and plan. Patient encouraged to contact us should they have any questions, concerns, or any changes in symptoms.      Thank you for allowing me to participate in the care of your patient.       ** Dictated with voice recognition software, please forgive any errors in grammar and/or spelling **

## 2025-03-24 NOTE — PATIENT INSTRUCTIONS
-Continue Voltaren gel on the area of pain 4 times a day as needed  -Do not walk barefoot at all   -Continue to Roll your foot over a golf ball or a frozen bottle of water throughout the day  -Continue plantar fasciitis exercises and resting night brace, this is the most important  -Referral to orthopedics provided, Dr. Luz, Dr. Rm, [place] Yan  -Consider other medications in the future  -Consider knee gel injections, repeat pes anserine bursitis injection, trigger point injections for your back  -Consider referral to rheumatology in the future  -Continue daily home exercises, dynamic active strengthening focusing on your core exercises  -Consider lumbar MRI, knee MRI in the future  -Go to [employer] [phone]  -Consider referral for Tenex procedure  -Follow-up 2 months ultrasound guided appointment for repeat L plantar fasciitis injection. If you are feeling better or planning on surgery, you can cancel.

## 2025-03-25 ENCOUNTER — APPOINTMENT (OUTPATIENT)
Dept: PHYSICAL MEDICINE AND REHAB | Facility: CLINIC | Age: 50
End: 2025-03-25
Payer: MEDICARE

## 2025-03-25 VITALS
TEMPERATURE: 97.3 F | BODY MASS INDEX: 35.84 KG/M2 | HEIGHT: 69 IN | WEIGHT: 242 LBS | OXYGEN SATURATION: 97 % | SYSTOLIC BLOOD PRESSURE: 125 MMHG | HEART RATE: 74 BPM | DIASTOLIC BLOOD PRESSURE: 85 MMHG

## 2025-03-25 DIAGNOSIS — G89.29 CHRONIC LOW BACK PAIN, UNSPECIFIED BACK PAIN LATERALITY, UNSPECIFIED WHETHER SCIATICA PRESENT: ICD-10-CM

## 2025-03-25 DIAGNOSIS — G89.29 CHRONIC PAIN OF BOTH SHOULDERS: ICD-10-CM

## 2025-03-25 DIAGNOSIS — M72.2 PLANTAR FASCIITIS OF LEFT FOOT: ICD-10-CM

## 2025-03-25 DIAGNOSIS — M25.511 CHRONIC PAIN OF BOTH SHOULDERS: ICD-10-CM

## 2025-03-25 DIAGNOSIS — M54.2 NECK PAIN: ICD-10-CM

## 2025-03-25 DIAGNOSIS — M17.31 POST-TRAUMATIC OSTEOARTHRITIS OF RIGHT KNEE: ICD-10-CM

## 2025-03-25 DIAGNOSIS — M25.561 CHRONIC PAIN OF RIGHT KNEE: Primary | ICD-10-CM

## 2025-03-25 DIAGNOSIS — M79.18 MYOFASCIAL PAIN SYNDROME: ICD-10-CM

## 2025-03-25 DIAGNOSIS — M54.50 CHRONIC LOW BACK PAIN, UNSPECIFIED BACK PAIN LATERALITY, UNSPECIFIED WHETHER SCIATICA PRESENT: ICD-10-CM

## 2025-03-25 DIAGNOSIS — M25.512 CHRONIC PAIN OF BOTH SHOULDERS: ICD-10-CM

## 2025-03-25 DIAGNOSIS — G89.29 CHRONIC PAIN OF RIGHT KNEE: Primary | ICD-10-CM

## 2025-03-25 DIAGNOSIS — M70.51 PES ANSERINUS BURSITIS OF RIGHT KNEE: ICD-10-CM

## 2025-03-25 PROCEDURE — 99214 OFFICE O/P EST MOD 30 MIN: CPT | Performed by: PHYSICAL MEDICINE & REHABILITATION

## 2025-03-25 PROCEDURE — 3008F BODY MASS INDEX DOCD: CPT | Performed by: PHYSICAL MEDICINE & REHABILITATION

## 2025-03-25 PROCEDURE — G2211 COMPLEX E/M VISIT ADD ON: HCPCS | Performed by: PHYSICAL MEDICINE & REHABILITATION

## 2025-03-25 ASSESSMENT — PAIN SCALES - GENERAL: PAINLEVEL_OUTOF10: 2

## 2025-04-06 ASSESSMENT — PROMIS GLOBAL HEALTH SCALE
CARRYOUT_PHYSICAL_ACTIVITIES: MODERATELY
RATE_QUALITY_OF_LIFE: FAIR
CARRYOUT_SOCIAL_ACTIVITIES: FAIR
RATE_GENERAL_HEALTH: FAIR
EMOTIONAL_PROBLEMS: SOMETIMES
RATE_MENTAL_HEALTH: FAIR
RATE_SOCIAL_SATISFACTION: GOOD
RATE_AVERAGE_FATIGUE: MODERATE
RATE_PHYSICAL_HEALTH: FAIR
RATE_AVERAGE_PAIN: 1

## 2025-04-07 ENCOUNTER — APPOINTMENT (OUTPATIENT)
Dept: PRIMARY CARE | Facility: CLINIC | Age: 50
End: 2025-04-07
Payer: MEDICARE

## 2025-04-07 VITALS
BODY MASS INDEX: 36.73 KG/M2 | HEIGHT: 69 IN | SYSTOLIC BLOOD PRESSURE: 126 MMHG | DIASTOLIC BLOOD PRESSURE: 82 MMHG | WEIGHT: 248 LBS

## 2025-04-07 DIAGNOSIS — I10 HYPERTENSION, UNSPECIFIED TYPE: ICD-10-CM

## 2025-04-07 DIAGNOSIS — F31.32 BIPOLAR AFFECTIVE DISORDER, CURRENTLY DEPRESSED, MODERATE (MULTI): ICD-10-CM

## 2025-04-07 DIAGNOSIS — E03.8 OTHER SPECIFIED HYPOTHYROIDISM: ICD-10-CM

## 2025-04-07 DIAGNOSIS — E66.01 OBESITY, MORBID (MULTI): ICD-10-CM

## 2025-04-07 DIAGNOSIS — Z12.5 ENCOUNTER FOR PROSTATE CANCER SCREENING: ICD-10-CM

## 2025-04-07 DIAGNOSIS — J32.9 RHINOSINUSITIS: ICD-10-CM

## 2025-04-07 DIAGNOSIS — Z00.00 ANNUAL PHYSICAL EXAM: ICD-10-CM

## 2025-04-07 DIAGNOSIS — F32.A DEPRESSION, UNSPECIFIED DEPRESSION TYPE: ICD-10-CM

## 2025-04-07 DIAGNOSIS — Z12.11 ENCOUNTER FOR SCREENING COLONOSCOPY: ICD-10-CM

## 2025-04-07 DIAGNOSIS — Z00.00 WELCOME TO MEDICARE PREVENTIVE VISIT: ICD-10-CM

## 2025-04-07 DIAGNOSIS — Z13.6 SCREENING FOR AAA (ABDOMINAL AORTIC ANEURYSM): ICD-10-CM

## 2025-04-07 DIAGNOSIS — E78.6 HYPOCHOLESTEROLEMIA: Primary | ICD-10-CM

## 2025-04-07 DIAGNOSIS — J01.00 SUBACUTE MAXILLARY SINUSITIS: ICD-10-CM

## 2025-04-07 DIAGNOSIS — J40 BRONCHITIS: ICD-10-CM

## 2025-04-07 DIAGNOSIS — F14.10 COCAINE ABUSE, UNCOMPLICATED (MULTI): ICD-10-CM

## 2025-04-07 DIAGNOSIS — J02.9 PHARYNGITIS, UNSPECIFIED ETIOLOGY: ICD-10-CM

## 2025-04-07 DIAGNOSIS — E16.2 HYPOGLYCEMIA, UNSPECIFIED: ICD-10-CM

## 2025-04-07 DIAGNOSIS — D45 POLYCYTHEMIA VERA: ICD-10-CM

## 2025-04-07 PROCEDURE — G0403 EKG FOR INITIAL PREVENT EXAM: HCPCS | Performed by: INTERNAL MEDICINE

## 2025-04-07 PROCEDURE — G0402 INITIAL PREVENTIVE EXAM: HCPCS | Performed by: INTERNAL MEDICINE

## 2025-04-07 PROCEDURE — 3074F SYST BP LT 130 MM HG: CPT | Performed by: INTERNAL MEDICINE

## 2025-04-07 PROCEDURE — 3008F BODY MASS INDEX DOCD: CPT | Performed by: INTERNAL MEDICINE

## 2025-04-07 PROCEDURE — 3079F DIAST BP 80-89 MM HG: CPT | Performed by: INTERNAL MEDICINE

## 2025-04-07 PROCEDURE — 99213 OFFICE O/P EST LOW 20 MIN: CPT | Performed by: INTERNAL MEDICINE

## 2025-04-07 RX ORDER — LORATADINE 10 MG/1
10 TABLET ORAL DAILY
Qty: 30 TABLET | Refills: 1 | Status: SHIPPED | OUTPATIENT
Start: 2025-04-07 | End: 2025-07-06

## 2025-04-07 RX ORDER — FLUTICASONE FUROATE 27.5 UG/1
1 SPRAY, METERED NASAL
Qty: 10 G | Refills: 2 | Status: SHIPPED | OUTPATIENT
Start: 2025-04-07 | End: 2025-05-07

## 2025-04-07 RX ORDER — BENZONATATE 100 MG/1
100 CAPSULE ORAL 3 TIMES DAILY PRN
Qty: 60 CAPSULE | Refills: 0 | Status: SHIPPED | OUTPATIENT
Start: 2025-04-07 | End: 2025-05-07

## 2025-04-07 RX ORDER — LEVOFLOXACIN 500 MG/1
500 TABLET, FILM COATED ORAL DAILY
Qty: 10 TABLET | Refills: 0 | Status: SHIPPED | OUTPATIENT
Start: 2025-04-07 | End: 2025-04-17

## 2025-04-07 ASSESSMENT — ENCOUNTER SYMPTOMS
OCCASIONAL FEELINGS OF UNSTEADINESS: 0
DEPRESSION: 0
LOSS OF SENSATION IN FEET: 0

## 2025-04-07 ASSESSMENT — ACTIVITIES OF DAILY LIVING (ADL)
DRESSING: INDEPENDENT
MANAGING_FINANCES: INDEPENDENT
TAKING_MEDICATION: INDEPENDENT
DOING_HOUSEWORK: INDEPENDENT
GROCERY_SHOPPING: INDEPENDENT
BATHING: INDEPENDENT

## 2025-04-07 ASSESSMENT — PATIENT HEALTH QUESTIONNAIRE - PHQ9
1. LITTLE INTEREST OR PLEASURE IN DOING THINGS: NOT AT ALL
SUM OF ALL RESPONSES TO PHQ9 QUESTIONS 1 AND 2: 0
2. FEELING DOWN, DEPRESSED OR HOPELESS: NOT AT ALL

## 2025-04-08 NOTE — PROGRESS NOTES
"Subjective   Patient ID: Cecil Ruiz is a 50 y.o. male who presents for Medicare Annual Wellness Visit Initial.    Mr. Cecil Ruiz today came here for multiple medical issues.  1. Welcome to Medicare.  2. He has cough, yellow sputum, and congestion.  3. Follow-up on blood work and other conditions.    IMMUNIZATION:  I will update his shingles and pneumonia shot.    I have personally reviewed the patient's Past Medical History, Medications, Allergies, Social History, and Family History in the EMR.    Review of Systems   All other systems reviewed and are negative.  He has never had a heart attack, stroke, diabetes, or cancer.    Objective   /82   Ht 1.753 m (5' 9\")   Wt 112 kg (248 lb)   BMI 36.62 kg/m²     Physical Exam  Vitals reviewed.   HENT:      Right Ear: Tympanic membrane, ear canal and external ear normal.      Left Ear: Tympanic membrane, ear canal and external ear normal.      Nose: Congestion present.      Comments: Postnasal drip.     Mouth/Throat:      Comments: Throat congested.  Eyes:      General: No scleral icterus.     Pupils: Pupils are equal, round, and reactive to light.   Neck:      Vascular: No carotid bruit.   Cardiovascular:      Heart sounds: Normal heart sounds, S1 normal and S2 normal. No murmur heard.     No friction rub.   Pulmonary:      Effort: Pulmonary effort is normal.      Breath sounds: Wheezing (Bilateral) present.   Abdominal:      Palpations: There is no hepatomegaly, splenomegaly or mass.   Musculoskeletal:         General: No swelling or deformity. Normal range of motion.      Cervical back: Neck supple.      Right lower leg: No edema.      Left lower leg: No edema.   Lymphadenopathy:      Cervical: No cervical adenopathy.      Upper Body:      Right upper body: No axillary adenopathy.      Left upper body: No axillary adenopathy.      Lower Body: No right inguinal adenopathy. No left inguinal adenopathy.   Neurological:      Mental Status: He is oriented to " person, place, and time.      Cranial Nerves: Cranial nerves 2-12 are intact. No cranial nerve deficit.      Sensory: No sensory deficit.      Motor: Motor function is intact. No weakness.      Gait: Gait is intact.      Deep Tendon Reflexes: Reflexes normal.   Psychiatric:         Mood and Affect: Mood normal. Mood is not anxious or depressed. Affect is not angry.         Behavior: Behavior is not agitated.         Thought Content: Thought content normal.         Judgment: Judgment normal.     LAB WORK:  Laboratory testing discussed.    Assessment/Plan   Problem List Items Addressed This Visit             ICD-10-CM    Hypocholesterolemia - Primary E78.6    Relevant Orders    Hemoglobin A1C     Other Visit Diagnoses         Codes    Encounter for prostate cancer screening     Z12.5    Relevant Orders    Comprehensive Metabolic Panel    Lipid Panel    Prostate Specific Antigen, Screen    Thyroid Stimulating Hormone    CBC and Auto Differential    Hemoglobin A1C    Annual physical exam     Z00.00    Relevant Orders    Hemoglobin A1C    Pharyngitis, unspecified etiology     J02.9    Relevant Medications    fluticasone (Flonase Sensimist) 27.5 mcg/actuation nasal spray    Subacute maxillary sinusitis     J01.00    Relevant Medications    levoFLOXacin (Levaquin) 500 mg tablet    benzonatate (Tessalon) 100 mg capsule    loratadine (Claritin) 10 mg tablet    Other specified hypothyroidism     E03.8    Relevant Orders    Lipid Panel    Thyroid Stimulating Hormone    CBC and Auto Differential    Hemoglobin A1C    Hypoglycemia, unspecified     E16.2    Relevant Orders    Hemoglobin A1C    Screening for AAA (abdominal aortic aneurysm)     Z13.6    Relevant Orders    Vascular US abdominal aorta anuerysm AAA screening    Welcome to Medicare preventive visit     Z00.00    Relevant Orders    ECG 12 lead (Clinic Performed)    Encounter for screening colonoscopy     Z12.11    Relevant Orders    Colonoscopy Screening; Average Risk  Patient    Rhinosinusitis     J32.9    Bronchitis     J40    Hypertension, unspecified type     I10    Depression, unspecified depression type     F32.A        1. Medicare Wellness Visit.  Welcome to Medicare done.  2. EKG, performed.  3. Triple A screening ordered, he is entitled.  4. Rhinosinusitis, pharyngitis, and bronchitis.  Levaquin, Claritin, Robitussin, and Flonase.  5. Hypertension, okay.  6. High cholesterol, okay.  7. Depression.  Monitor.  8. Complete blood work ordered.  9. The patient needs new colonoscopy down the road.  10. Follow-up appointment with me in a couple of weeks after test.  11. Welcome to my office.  I educated him about the shot.    Abril Attestation  By signing my name below, I, Abril Andrade attest that this documentation has been prepared under the direction and in the presence of Aldo Forrester MD.     All medical record entries made by the abril were personally dictated by me I have reviewed the chart and agree the record accurately reflects my personal performance of his history physical examination and management

## 2025-04-09 DIAGNOSIS — Z12.11 COLON CANCER SCREENING: Primary | ICD-10-CM

## 2025-04-09 PROBLEM — F14.10 COCAINE ABUSE, UNCOMPLICATED (MULTI): Status: ACTIVE | Noted: 2025-04-09

## 2025-04-09 PROBLEM — F31.32 BIPOLAR AFFECTIVE DISORDER, CURRENTLY DEPRESSED, MODERATE (MULTI): Status: ACTIVE | Noted: 2025-04-09

## 2025-04-09 PROBLEM — E66.01 OBESITY, MORBID (MULTI): Status: ACTIVE | Noted: 2025-04-09

## 2025-04-09 RX ORDER — POLYETHYLENE GLYCOL 3350, SODIUM SULFATE ANHYDROUS, SODIUM BICARBONATE, SODIUM CHLORIDE, POTASSIUM CHLORIDE 236; 22.74; 6.74; 5.86; 2.97 G/4L; G/4L; G/4L; G/4L; G/4L
4000 POWDER, FOR SOLUTION ORAL ONCE
Qty: 4000 ML | Refills: 0 | Status: SHIPPED | OUTPATIENT
Start: 2025-04-09 | End: 2025-04-09

## 2025-04-11 ENCOUNTER — APPOINTMENT (OUTPATIENT)
Dept: ORTHOPEDIC SURGERY | Facility: CLINIC | Age: 50
End: 2025-04-11
Payer: MEDICARE

## 2025-04-16 ENCOUNTER — HOSPITAL ENCOUNTER (OUTPATIENT)
Dept: RADIOLOGY | Facility: CLINIC | Age: 50
Discharge: HOME | End: 2025-04-16
Payer: MEDICARE

## 2025-04-16 DIAGNOSIS — Z13.6 SCREENING FOR AAA (ABDOMINAL AORTIC ANEURYSM): ICD-10-CM

## 2025-04-16 PROCEDURE — 76706 US ABDL AORTA SCREEN AAA: CPT

## 2025-04-19 LAB
ALBUMIN SERPL-MCNC: 4.7 G/DL (ref 3.6–5.1)
ALP SERPL-CCNC: 62 U/L (ref 35–144)
ALT SERPL-CCNC: 41 U/L (ref 9–46)
ANION GAP SERPL CALCULATED.4IONS-SCNC: 8 MMOL/L (CALC) (ref 7–17)
AST SERPL-CCNC: 26 U/L (ref 10–35)
BASOPHILS # BLD AUTO: 70 CELLS/UL (ref 0–200)
BASOPHILS NFR BLD AUTO: 1.2 %
BILIRUB SERPL-MCNC: 0.8 MG/DL (ref 0.2–1.2)
BUN SERPL-MCNC: 16 MG/DL (ref 7–25)
CALCIUM SERPL-MCNC: 9.6 MG/DL (ref 8.6–10.3)
CHLORIDE SERPL-SCNC: 107 MMOL/L (ref 98–110)
CHOLEST SERPL-MCNC: 205 MG/DL
CHOLEST/HDLC SERPL: 3.9 (CALC)
CO2 SERPL-SCNC: 26 MMOL/L (ref 20–32)
CREAT SERPL-MCNC: 0.95 MG/DL (ref 0.7–1.3)
EGFRCR SERPLBLD CKD-EPI 2021: 98 ML/MIN/1.73M2
EOSINOPHIL # BLD AUTO: 180 CELLS/UL (ref 15–500)
EOSINOPHIL NFR BLD AUTO: 3.1 %
ERYTHROCYTE [DISTWIDTH] IN BLOOD BY AUTOMATED COUNT: 12.5 % (ref 11–15)
EST. AVERAGE GLUCOSE BLD GHB EST-MCNC: 100 MG/DL
EST. AVERAGE GLUCOSE BLD GHB EST-SCNC: 5.5 MMOL/L
GLUCOSE SERPL-MCNC: 104 MG/DL (ref 65–99)
HBA1C MFR BLD: 5.1 %
HCT VFR BLD AUTO: 51 % (ref 38.5–50)
HDLC SERPL-MCNC: 53 MG/DL
HGB BLD-MCNC: 17.1 G/DL (ref 13.2–17.1)
LDLC SERPL CALC-MCNC: 132 MG/DL (CALC)
LYMPHOCYTES # BLD AUTO: 1415 CELLS/UL (ref 850–3900)
LYMPHOCYTES NFR BLD AUTO: 24.4 %
MCH RBC QN AUTO: 30.5 PG (ref 27–33)
MCHC RBC AUTO-ENTMCNC: 33.5 G/DL (ref 32–36)
MCV RBC AUTO: 90.9 FL (ref 80–100)
MONOCYTES # BLD AUTO: 470 CELLS/UL (ref 200–950)
MONOCYTES NFR BLD AUTO: 8.1 %
NEUTROPHILS # BLD AUTO: 3666 CELLS/UL (ref 1500–7800)
NEUTROPHILS NFR BLD AUTO: 63.2 %
NONHDLC SERPL-MCNC: 152 MG/DL (CALC)
PLATELET # BLD AUTO: 293 THOUSAND/UL (ref 140–400)
PMV BLD REES-ECKER: 10.1 FL (ref 7.5–12.5)
POTASSIUM SERPL-SCNC: 4 MMOL/L (ref 3.5–5.3)
PROT SERPL-MCNC: 7.1 G/DL (ref 6.1–8.1)
PSA SERPL-MCNC: 2.27 NG/ML
RBC # BLD AUTO: 5.61 MILLION/UL (ref 4.2–5.8)
SODIUM SERPL-SCNC: 141 MMOL/L (ref 135–146)
TRIGL SERPL-MCNC: 97 MG/DL
TSH SERPL-ACNC: 2.54 MIU/L (ref 0.4–4.5)
WBC # BLD AUTO: 5.8 THOUSAND/UL (ref 3.8–10.8)

## 2025-04-21 ENCOUNTER — APPOINTMENT (OUTPATIENT)
Dept: PRIMARY CARE | Facility: CLINIC | Age: 50
End: 2025-04-21
Payer: MEDICARE

## 2025-04-21 VITALS
SYSTOLIC BLOOD PRESSURE: 138 MMHG | BODY MASS INDEX: 35.7 KG/M2 | WEIGHT: 241 LBS | HEIGHT: 69 IN | DIASTOLIC BLOOD PRESSURE: 84 MMHG

## 2025-04-21 DIAGNOSIS — F32.A ANXIETY AND DEPRESSION: ICD-10-CM

## 2025-04-21 DIAGNOSIS — H91.90 HARD OF HEARING: Primary | ICD-10-CM

## 2025-04-21 DIAGNOSIS — F41.9 ANXIETY AND DEPRESSION: ICD-10-CM

## 2025-04-21 DIAGNOSIS — E78.00 HIGH CHOLESTEROL: ICD-10-CM

## 2025-04-21 PROCEDURE — 99213 OFFICE O/P EST LOW 20 MIN: CPT | Performed by: INTERNAL MEDICINE

## 2025-04-21 PROCEDURE — 3008F BODY MASS INDEX DOCD: CPT | Performed by: INTERNAL MEDICINE

## 2025-04-21 PROCEDURE — G2211 COMPLEX E/M VISIT ADD ON: HCPCS | Performed by: INTERNAL MEDICINE

## 2025-04-21 NOTE — PROGRESS NOTES
"Subjective   Patient ID: Cecil Ruiz is a 50 y.o. male who presents for Follow-up.    It is always a delight to serve Mr. Ruiz.  Today, he came here for follow-up after blood work, but new problem, he is experiencing some hard of hearing.  He is not sure whether it is a right or left ear, but not much dizziness.  He had a blood work, Triple A screening.  Overall, okay.    I have personally reviewed the patient's Past Medical History, Medications, Allergies, Social History, and Family History in the EMR.    Review of Systems   All other systems reviewed and are negative.    Objective   /84   Ht 1.753 m (5' 9\")   Wt 109 kg (241 lb)   BMI 35.59 kg/m²     Physical Exam  Vitals reviewed.   HENT:      Right Ear: Tympanic membrane is retracted (slightly).      Left Ear: Tympanic membrane is retracted (slightly).      Ears:      Comments: Ear canal clean.  Cardiovascular:      Heart sounds: Normal heart sounds, S1 normal and S2 normal. No murmur heard.     No friction rub.   Pulmonary:      Effort: Pulmonary effort is normal.      Breath sounds: Normal breath sounds and air entry.   Abdominal:      Palpations: There is no hepatomegaly, splenomegaly or mass.   Musculoskeletal:      Right lower leg: No edema.      Left lower leg: No edema.   Lymphadenopathy:      Lower Body: No right inguinal adenopathy. No left inguinal adenopathy.   Neurological:      Cranial Nerves: Cranial nerves 2-12 are intact.      Sensory: No sensory deficit.      Motor: Motor function is intact.      Deep Tendon Reflexes: Reflexes are normal and symmetric.     LAB WORK:  Laboratory testing discussed.    Assessment/Plan   Problem List Items Addressed This Visit    None  Visit Diagnoses         Codes      Hard of hearing    -  Primary H91.90    Relevant Orders    Audiometry with tympanometry      High cholesterol     E78.00      Anxiety and depression     F41.9, F32.A        1. Hard of hearing.  Immediately audiogram ordered.  2. " Cholesterol.  Diet and exercise.  3. Anxiety and depression.  He sees psychiatrist.  4. Rest of blood work okay.  5. Triple A screening.  Official report pending, but it looks negative.  6. Follow-up appointment with me in a week after audiogram.  Happy to serve him anytime sooner should it would be necessary.  7. Continue his care with Psychiatry.    Naseem Attestation  By signing my name below, IKeiry Scribe attest that this documentation has been prepared under the direction and in the presence of Aldo Forrester MD.     All medical record entries made by the emperatrizibyuri were personally dictated by me I have reviewed the chart and agree the record accurately reflects my personal performance of his history physical examination and management

## 2025-04-22 ENCOUNTER — CLINICAL SUPPORT (OUTPATIENT)
Dept: AUDIOLOGY | Facility: CLINIC | Age: 50
End: 2025-04-22
Payer: MEDICARE

## 2025-04-22 DIAGNOSIS — H90.3 SENSORINEURAL HEARING LOSS (SNHL) OF BOTH EARS: Primary | ICD-10-CM

## 2025-04-22 PROCEDURE — 92550 TYMPANOMETRY & REFLEX THRESH: CPT | Mod: 52

## 2025-04-22 PROCEDURE — 92557 COMPREHENSIVE HEARING TEST: CPT

## 2025-04-28 DIAGNOSIS — J01.00 SUBACUTE MAXILLARY SINUSITIS: ICD-10-CM

## 2025-04-28 RX ORDER — BENZONATATE 100 MG/1
100 CAPSULE ORAL 3 TIMES DAILY PRN
Qty: 60 CAPSULE | Refills: 0 | Status: SHIPPED | OUTPATIENT
Start: 2025-04-28 | End: 2025-05-28

## 2025-04-30 ENCOUNTER — HOSPITAL ENCOUNTER (OUTPATIENT)
Dept: RADIOLOGY | Facility: CLINIC | Age: 50
Discharge: HOME | End: 2025-04-30
Payer: MEDICARE

## 2025-04-30 ENCOUNTER — OFFICE VISIT (OUTPATIENT)
Dept: PRIMARY CARE | Facility: CLINIC | Age: 50
End: 2025-04-30
Payer: MEDICARE

## 2025-04-30 ENCOUNTER — PHARMACY VISIT (OUTPATIENT)
Dept: PHARMACY | Facility: CLINIC | Age: 50
End: 2025-04-30
Payer: MEDICARE

## 2025-04-30 VITALS
WEIGHT: 249 LBS | SYSTOLIC BLOOD PRESSURE: 130 MMHG | HEIGHT: 69 IN | DIASTOLIC BLOOD PRESSURE: 72 MMHG | BODY MASS INDEX: 36.88 KG/M2

## 2025-04-30 DIAGNOSIS — B35.3 TINEA PEDIS OF LEFT FOOT: ICD-10-CM

## 2025-04-30 DIAGNOSIS — M79.672 FOOT PAIN, LEFT: Primary | ICD-10-CM

## 2025-04-30 DIAGNOSIS — M79.672 FOOT PAIN, LEFT: ICD-10-CM

## 2025-04-30 DIAGNOSIS — F32.A ANXIETY AND DEPRESSION: ICD-10-CM

## 2025-04-30 DIAGNOSIS — F41.9 ANXIETY AND DEPRESSION: ICD-10-CM

## 2025-04-30 PROCEDURE — 73630 X-RAY EXAM OF FOOT: CPT | Mod: LT

## 2025-04-30 PROCEDURE — RXMED WILLOW AMBULATORY MEDICATION CHARGE

## 2025-04-30 PROCEDURE — 99214 OFFICE O/P EST MOD 30 MIN: CPT | Performed by: INTERNAL MEDICINE

## 2025-04-30 PROCEDURE — 3008F BODY MASS INDEX DOCD: CPT | Performed by: INTERNAL MEDICINE

## 2025-04-30 RX ORDER — ECONAZOLE NITRATE 10 MG/G
CREAM TOPICAL 2 TIMES DAILY PRN
Qty: 30 G | Refills: 0 | Status: SHIPPED | OUTPATIENT
Start: 2025-04-30 | End: 2025-08-28

## 2025-04-30 RX ORDER — ZOSTER VACCINE RECOMBINANT, ADJUVANTED 50 MCG/0.5
KIT INTRAMUSCULAR
Qty: 0.5 ML | Refills: 0 | OUTPATIENT
Start: 2025-04-30

## 2025-04-30 RX ORDER — NABUMETONE 750 MG/1
750 TABLET, FILM COATED ORAL 2 TIMES DAILY
Qty: 60 TABLET | Refills: 11 | Status: SHIPPED | OUTPATIENT
Start: 2025-04-30 | End: 2026-04-30

## 2025-04-30 NOTE — PROGRESS NOTES
"Subjective   Patient ID: Cecil Ruiz is a 50 y.o. male who presents for Follow-up (Foot pain).    1. This gentleman, Mr. Ruiz today came here for pain and swelling in the left leg, middle of the foot.  It happened out of the blue.  He heard a snap.  It is very painful, difficulty in walking.  2. Athlete foot, not feeling good.  He is going for shots.    I have personally reviewed the patient's Past Medical History, Medications, Allergies, Social History, and Family History in the EMR.    Review of Systems   All other systems reviewed and are negative.    Objective   /72   Ht 1.753 m (5' 9\")   Wt 113 kg (249 lb)   BMI 36.77 kg/m²     Physical Exam  Vitals reviewed.   Cardiovascular:      Heart sounds: Normal heart sounds, S1 normal and S2 normal. No murmur heard.     No friction rub.   Pulmonary:      Effort: Pulmonary effort is normal.      Breath sounds: Normal breath sounds and air entry.   Abdominal:      Palpations: There is no hepatomegaly, splenomegaly or mass.   Musculoskeletal:      Right lower leg: No edema.      Left lower leg: No edema.      Comments: Left leg mid foot minimal tenderness present.   Lymphadenopathy:      Lower Body: No right inguinal adenopathy. No left inguinal adenopathy.   Neurological:      Cranial Nerves: Cranial nerves 2-12 are intact.      Sensory: No sensory deficit.      Motor: Motor function is intact.      Deep Tendon Reflexes: Reflexes are normal and symmetric.     LAB WORK:  Laboratory testing discussed.    Assessment/Plan   Problem List Items Addressed This Visit    None  Visit Diagnoses         Codes      Foot pain, left    -  Primary M79.672    Relevant Medications    econazole nitrate 1 % cream    nabumetone (Relafen) 750 mg tablet    Other Relevant Orders    XR foot left 3+ views (Completed)      Tinea pedis of left foot     B35.3      Anxiety and depression     F41.9, F32.A        1. Left foot tenderness, could be spontaneous fracture.  X-ray ordered.  2. " Athlete foot.  I gave him Spectazole cream for both the legs.  We will monitor.  If it is not effective, refer to Orthopedics.  3. Anxiety and depression, okay.  4. Immunization.  He is going for shots.  Check with insurance.  5. I shall see him back in a week.    Scribe Attestation  By signing my name below, IKeiry Scribe attest that this documentation has been prepared under the direction and in the presence of Aldo Forrester MD.     All medical record entries made by the emperatrizibe were personally dictated by me I have reviewed the chart and agree the record accurately reflects my personal performance of his history physical examination and management

## 2025-05-02 ENCOUNTER — PHARMACY VISIT (OUTPATIENT)
Dept: PHARMACY | Facility: CLINIC | Age: 50
End: 2025-05-02
Payer: MEDICARE

## 2025-05-02 PROCEDURE — RXMED WILLOW AMBULATORY MEDICATION CHARGE

## 2025-05-02 RX ORDER — PNEUMOCOCCAL 20-VALENT CONJUGATE VACCINE 2.2; 2.2; 2.2; 2.2; 2.2; 2.2; 2.2; 2.2; 2.2; 2.2; 2.2; 2.2; 2.2; 2.2; 2.2; 2.2; 4.4; 2.2; 2.2; 2.2 UG/.5ML; UG/.5ML; UG/.5ML; UG/.5ML; UG/.5ML; UG/.5ML; UG/.5ML; UG/.5ML; UG/.5ML; UG/.5ML; UG/.5ML; UG/.5ML; UG/.5ML; UG/.5ML; UG/.5ML; UG/.5ML; UG/.5ML; UG/.5ML; UG/.5ML; UG/.5ML
INJECTION, SUSPENSION INTRAMUSCULAR
Qty: 0.5 ML | Refills: 0 | OUTPATIENT
Start: 2025-05-02

## 2025-05-06 ENCOUNTER — APPOINTMENT (OUTPATIENT)
Dept: BEHAVIORAL HEALTH | Facility: CLINIC | Age: 50
End: 2025-05-06
Payer: MEDICARE

## 2025-05-06 DIAGNOSIS — F43.10 PTSD (POST-TRAUMATIC STRESS DISORDER): ICD-10-CM

## 2025-05-06 DIAGNOSIS — F41.8 ANXIETY WITH DEPRESSION: ICD-10-CM

## 2025-05-06 PROCEDURE — 99214 OFFICE O/P EST MOD 30 MIN: CPT | Performed by: PSYCHIATRY & NEUROLOGY

## 2025-05-06 RX ORDER — VENLAFAXINE HYDROCHLORIDE 75 MG/1
CAPSULE, EXTENDED RELEASE ORAL
Qty: 30 CAPSULE | Refills: 2 | Status: SHIPPED | OUTPATIENT
Start: 2025-05-06

## 2025-05-06 RX ORDER — BUSPIRONE HYDROCHLORIDE 15 MG/1
TABLET ORAL
Qty: 90 TABLET | Refills: 2 | Status: SHIPPED | OUTPATIENT
Start: 2025-05-06

## 2025-05-06 RX ORDER — QUETIAPINE FUMARATE 25 MG/1
25 TABLET, FILM COATED ORAL NIGHTLY
Qty: 30 TABLET | Refills: 2 | Status: SHIPPED | OUTPATIENT
Start: 2025-05-06

## 2025-05-06 NOTE — H&P (VIEW-ONLY)
Subjective   Patient ID: Cecil Ruiz is a 50 y.o. male.    HPI  Diagnoses of Anxiety with depression and PTSD (post-traumatic stress disorder) were pertinent to this visit.  Pt reports mood symptoms:  Annoyed easily;  Irritable;  Especially--social situations, easily triggered;  Mood--symptom of irritability;  Denies specific triggers;  Review of Systems   Psychiatric/Behavioral:  Negative for self-injury and suicidal ideas. The patient is not hyperactive.        Objective   Physical Exam  Psychiatric:         Attention and Perception: Perception normal.         Behavior: Behavior normal.         Thought Content: Thought content does not include homicidal or suicidal ideation.       BP: 130/72  as of 4/30/2025 130/72 138/84   Heart Rate: 74  as of 3/25/2025         Assessment/Plan   Keep next visit: 7/1  Increase Effexor  mg daily  Stop abilify and re-start seroquel with plan to titrate seroquel and switch to XR for mood stabilization effects over next several visits;  Diagnoses and all orders for this visit:  Anxiety with depression  -     venlafaxine XR (Effexor XR) 75 mg 24 hr capsule; Do not crush or chew.  Take 1 cap every am with 150 mg strength for 225 mg total daily dose  -     Follow Up In Psychiatry; Future  -     busPIRone (Buspar) 15 mg tablet; Take 1 tablet (15 mg) by mouth in the morning and 1 tablet (15 mg) in the evening and 1 tablet (15 mg) before bedtime.  PTSD (post-traumatic stress disorder)  -     QUEtiapine (SeroqueL) 25 mg tablet; Take 1 tablet (25 mg) by mouth once daily at bedtime.

## 2025-05-11 ENCOUNTER — ANESTHESIA EVENT (OUTPATIENT)
Dept: OPERATING ROOM | Facility: HOSPITAL | Age: 50
End: 2025-05-11
Payer: MEDICARE

## 2025-05-11 RX ORDER — SODIUM CHLORIDE, SODIUM LACTATE, POTASSIUM CHLORIDE, CALCIUM CHLORIDE 600; 310; 30; 20 MG/100ML; MG/100ML; MG/100ML; MG/100ML
100 INJECTION, SOLUTION INTRAVENOUS CONTINUOUS
OUTPATIENT
Start: 2025-05-11 | End: 2025-05-11

## 2025-05-11 RX ORDER — ONDANSETRON HYDROCHLORIDE 2 MG/ML
8 INJECTION, SOLUTION INTRAVENOUS ONCE
OUTPATIENT
Start: 2025-05-11 | End: 2025-05-11

## 2025-05-11 RX ORDER — ALBUTEROL SULFATE 0.83 MG/ML
2.5 SOLUTION RESPIRATORY (INHALATION) ONCE AS NEEDED
OUTPATIENT
Start: 2025-05-11

## 2025-05-11 RX ORDER — ACETAMINOPHEN 325 MG/1
650 TABLET ORAL EVERY 4 HOURS PRN
OUTPATIENT
Start: 2025-05-11

## 2025-05-11 ASSESSMENT — ENCOUNTER SYMPTOMS: HYPERACTIVE: 0

## 2025-05-12 ENCOUNTER — ANESTHESIA (OUTPATIENT)
Dept: OPERATING ROOM | Facility: HOSPITAL | Age: 50
End: 2025-05-12
Payer: MEDICARE

## 2025-05-12 ENCOUNTER — HOSPITAL ENCOUNTER (OUTPATIENT)
Dept: OPERATING ROOM | Facility: HOSPITAL | Age: 50
Setting detail: OUTPATIENT SURGERY
Discharge: HOME | End: 2025-05-12
Payer: MEDICARE

## 2025-05-12 VITALS
RESPIRATION RATE: 15 BRPM | BODY MASS INDEX: 36.83 KG/M2 | DIASTOLIC BLOOD PRESSURE: 73 MMHG | SYSTOLIC BLOOD PRESSURE: 128 MMHG | TEMPERATURE: 97.7 F | HEART RATE: 70 BPM | WEIGHT: 248.68 LBS | HEIGHT: 69 IN | OXYGEN SATURATION: 97 %

## 2025-05-12 DIAGNOSIS — Z12.11 ENCOUNTER FOR SCREENING COLONOSCOPY: ICD-10-CM

## 2025-05-12 DIAGNOSIS — K57.92 DIVERTICULITIS: Primary | ICD-10-CM

## 2025-05-12 PROCEDURE — G0121 COLON CA SCRN NOT HI RSK IND: HCPCS | Performed by: INTERNAL MEDICINE

## 2025-05-12 PROCEDURE — A45378 PR COLONOSCOPY,DIAGNOSTIC: Performed by: ANESTHESIOLOGY

## 2025-05-12 PROCEDURE — A45378 PR COLONOSCOPY,DIAGNOSTIC: Performed by: NURSE ANESTHETIST, CERTIFIED REGISTERED

## 2025-05-12 PROCEDURE — 2500000004 HC RX 250 GENERAL PHARMACY W/ HCPCS (ALT 636 FOR OP/ED): Mod: JZ | Performed by: NURSE ANESTHETIST, CERTIFIED REGISTERED

## 2025-05-12 PROCEDURE — 3600000007 HC OR TIME - EACH INCREMENTAL 1 MINUTE - PROCEDURE LEVEL TWO

## 2025-05-12 PROCEDURE — 3700000002 HC GENERAL ANESTHESIA TIME - EACH INCREMENTAL 1 MINUTE

## 2025-05-12 PROCEDURE — 7100000009 HC PHASE TWO TIME - INITIAL BASE CHARGE

## 2025-05-12 PROCEDURE — 7100000010 HC PHASE TWO TIME - EACH INCREMENTAL 1 MINUTE

## 2025-05-12 PROCEDURE — 3700000001 HC GENERAL ANESTHESIA TIME - INITIAL BASE CHARGE

## 2025-05-12 PROCEDURE — 3600000002 HC OR TIME - INITIAL BASE CHARGE - PROCEDURE LEVEL TWO

## 2025-05-12 RX ORDER — CIPROFLOXACIN 500 MG/1
500 TABLET, FILM COATED ORAL 2 TIMES DAILY
Qty: 10 TABLET | Refills: 0 | Status: SHIPPED | OUTPATIENT
Start: 2025-05-12 | End: 2025-05-17

## 2025-05-12 RX ORDER — LIDOCAINE HYDROCHLORIDE 10 MG/ML
INJECTION, SOLUTION EPIDURAL; INFILTRATION; INTRACAUDAL; PERINEURAL AS NEEDED
Status: DISCONTINUED | OUTPATIENT
Start: 2025-05-12 | End: 2025-05-12

## 2025-05-12 RX ORDER — MIDAZOLAM HYDROCHLORIDE 1 MG/ML
INJECTION, SOLUTION INTRAMUSCULAR; INTRAVENOUS AS NEEDED
Status: DISCONTINUED | OUTPATIENT
Start: 2025-05-12 | End: 2025-05-12

## 2025-05-12 RX ORDER — SODIUM CHLORIDE, SODIUM LACTATE, POTASSIUM CHLORIDE, CALCIUM CHLORIDE 600; 310; 30; 20 MG/100ML; MG/100ML; MG/100ML; MG/100ML
INJECTION, SOLUTION INTRAVENOUS CONTINUOUS PRN
Status: DISCONTINUED | OUTPATIENT
Start: 2025-05-12 | End: 2025-05-12

## 2025-05-12 RX ORDER — METRONIDAZOLE 500 MG/1
500 TABLET ORAL 2 TIMES DAILY
Qty: 10 TABLET | Refills: 0 | Status: SHIPPED | OUTPATIENT
Start: 2025-05-12 | End: 2025-05-17

## 2025-05-12 RX ORDER — PROPOFOL 10 MG/ML
INJECTION, EMULSION INTRAVENOUS AS NEEDED
Status: DISCONTINUED | OUTPATIENT
Start: 2025-05-12 | End: 2025-05-12

## 2025-05-12 RX ADMIN — PROPOFOL 15 MG: 10 INJECTION, EMULSION INTRAVENOUS at 10:45

## 2025-05-12 RX ADMIN — MIDAZOLAM 2 MG: 1 INJECTION INTRAMUSCULAR; INTRAVENOUS at 10:27

## 2025-05-12 RX ADMIN — LIDOCAINE HYDROCHLORIDE 20 MG: 10 SOLUTION INTRAVENOUS at 10:36

## 2025-05-12 RX ADMIN — PROPOFOL 60 MG: 10 INJECTION, EMULSION INTRAVENOUS at 10:39

## 2025-05-12 RX ADMIN — PROPOFOL 180 MCG/KG/MIN: 10 INJECTION, EMULSION INTRAVENOUS at 10:40

## 2025-05-12 RX ADMIN — SODIUM CHLORIDE, POTASSIUM CHLORIDE, SODIUM LACTATE AND CALCIUM CHLORIDE: 600; 310; 30; 20 INJECTION, SOLUTION INTRAVENOUS at 10:27

## 2025-05-12 ASSESSMENT — PAIN SCALES - GENERAL
PAINLEVEL_OUTOF10: 0 - NO PAIN
PAINLEVEL_OUTOF10: 0 - NO PAIN

## 2025-05-12 ASSESSMENT — COLUMBIA-SUICIDE SEVERITY RATING SCALE - C-SSRS
2. HAVE YOU ACTUALLY HAD ANY THOUGHTS OF KILLING YOURSELF?: NO
6. HAVE YOU EVER DONE ANYTHING, STARTED TO DO ANYTHING, OR PREPARED TO DO ANYTHING TO END YOUR LIFE?: NO
1. IN THE PAST MONTH, HAVE YOU WISHED YOU WERE DEAD OR WISHED YOU COULD GO TO SLEEP AND NOT WAKE UP?: NO

## 2025-05-12 ASSESSMENT — PAIN - FUNCTIONAL ASSESSMENT
PAIN_FUNCTIONAL_ASSESSMENT: 0-10
PAIN_FUNCTIONAL_ASSESSMENT: 0-10

## 2025-05-12 NOTE — ANESTHESIA PREPROCEDURE EVALUATION
Patient: Cecil Ruiz    Procedure Information       Date/Time: 05/12/25 1050    Scheduled providers: Mandeep Carrizales MD; Justyn Swann MD    Procedure: COLONOSCOPY    Location: [facility] OR          Vitals:    05/12/25 0928   BP: (!) 134/92   Pulse: 74   Resp: 16   Temp: 36.5 °C (97.7 °F)   SpO2: 96%       Surgical History[1]  Medical History[2]  Current Medications[3]  Prior to Admission medications    Medication Sig Start Date End Date Taking? Authorizing Provider   ammonium lactate (Lac-Hydrin) 12 % lotion APPLY AND RUB IN A THIN LAYER TO AFFECTED AREA TWICE DAILY 6/24/21  Yes Historical Provider, MD   benzonatate (Tessalon) 100 mg capsule Take 1 capsule (100 mg) by mouth 3 times a day as needed for cough. Do not crush or chew. 4/28/25 5/28/25 Yes Aldo Forrester MD   busPIRone (Buspar) 15 mg tablet Take 1 tablet (15 mg) by mouth in the morning and 1 tablet (15 mg) in the evening and 1 tablet (15 mg) before bedtime. 5/6/25  Yes Naomie Jang MD   econazole nitrate 1 % cream Apply topically 2 times a day as needed for irritation or rash. 4/30/25 8/28/25 Yes Aldo Forrester MD   loratadine (Claritin) 10 mg tablet Take 1 tablet (10 mg) by mouth once daily. 4/7/25 7/6/25 Yes Aldo Forrester MD   multivit with minerals/lutein (MULTIVITAMIN 50 PLUS ORAL) Take 1 tablet by mouth once daily.   Yes Historical Provider, MD   nabumetone (Relafen) 750 mg tablet Take 1 tablet (750 mg) by mouth 2 times a day. 4/30/25 4/30/26 Yes Aldo Forrester MD   QUEtiapine (SeroqueL) 25 mg tablet Take 1 tablet (25 mg) by mouth once daily at bedtime. 5/6/25  Yes Naomie Jang MD   venlafaxine XR (Effexor XR) 150 mg 24 hr capsule Take 1 capsule (150 mg) by mouth once daily. Do not crush or chew. 2/20/25  Yes Naomie Jang MD   venlafaxine XR (Effexor XR) 75 mg 24 hr capsule Do not crush or chew.  Take 1 cap every am with 150 mg strength for 225 mg total daily dose 5/6/25  Yes Naomie Jang MD    fluticasone (Flonase Sensimist) 27.5 mcg/actuation nasal spray Administer 1 spray into each nostril once daily. 4/7/25 5/7/25  Aldo Forrester MD   pneumoc 20-cassia conj-dip cr,PF, (Prevnar 20, PF,) 0.5 mL vaccine Inject 0.5 mL into the muscle. 5/2/25   Aldo Forrester MD   zoster vaccine-recombinant adjuvanted (Shingrix, PF,) 50 mcg/0.5 mL vaccine Inject into the muscle. 4/30/25   Mikael Farias MD   ARIPiprazole (Abilify) 5 mg tablet Take 1 tablet (5 mg) by mouth once daily. 2/20/25 5/6/25  Naomie Jang MD   busPIRone (Buspar) 15 mg tablet Take 1 tablet (15 mg) by mouth in the morning and 1 tablet (15 mg) in the evening and 1 tablet (15 mg) before bedtime. 2/20/25 5/6/25  Naomie Jang MD   QUEtiapine (SeroqueL) 25 mg tablet Take 1 tab at bedtime as needed 2/20/25 5/6/25  Naomie Jang MD     RX Allergies[4]  Social History     Tobacco Use    Smoking status: Former     Types: Cigarettes    Smokeless tobacco: Not on file   Substance Use Topics    Alcohol use: Not Currently     Comment: Dependance in remission         Chemistry    Lab Results   Component Value Date/Time     04/18/2025 0913    K 4.0 04/18/2025 0913     04/18/2025 0913    CO2 26 04/18/2025 0913    BUN 16 04/18/2025 0913    CREATININE 0.95 04/18/2025 0913    Lab Results   Component Value Date/Time    CALCIUM 9.6 04/18/2025 0913    ALKPHOS 62 04/18/2025 0913    AST 26 04/18/2025 0913    ALT 41 04/18/2025 0913    BILITOT 0.8 04/18/2025 0913          Lab Results   Component Value Date/Time    WBC 5.8 04/18/2025 0913    HGB 17.1 04/18/2025 0913    HCT 51.0 (H) 04/18/2025 0913     04/18/2025 0913     Lab Results   Component Value Date/Time    PROTIME 12.3 04/10/2023 1552    INR 1.1 04/10/2023 1552     Encounter Date: 04/07/25   ECG 12 lead (Clinic Performed)    Narrative    ok     No results found for this or any previous visit from the past 1095 days.    Relevant Problems   Pulmonary   (+) Asthma   (+) FAMILIA (obstructive  sleep apnea)      Neuro   (+) Anxiety disorder   (+) Anxiety with depression   (+) Bipolar affective disorder, currently depressed, moderate (Multi)   (+) Neuromyotonia   (+) Radiculopathy of cervical spine      GI   (+) Irritable bowel syndrome without diarrhea      Endocrine   (+) Obesity, morbid (Multi)      Hematology   (+) Polycythemia vera      Musculoskeletal   (+) Post-traumatic osteoarthritis of right knee       Clinical information reviewed:   Tobacco  Allergies  Meds   Med Hx  Surg Hx   Fam Hx  Soc Hx        NPO Detail:  NPO/Void Status  Date of Last Liquid: 05/12/25  Time of Last Liquid: 0800  Date of Last Solid: 05/11/25 (banana am)  Time of Last Solid: 0700  Last Intake Type: Clear fluids  Time of Last Void: 0900         Physical Exam    Airway  Mallampati: II  TM distance: >3 FB  Neck ROM: full  Mouth opening: 3 or more finger widths     Cardiovascular - normal exam   Dental    Pulmonary - normal exam   Abdominal            Anesthesia Plan    History of general anesthesia?: yes  History of complications of general anesthesia?: no    ASA 2     MAC     intravenous induction   Anesthetic plan and risks discussed with patient.  Use of blood products discussed with patient who.    Plan discussed with CRNA and attending.           [1]   Past Surgical History:  Procedure Laterality Date    KNEE SURGERY  04/21/2017    Knee Surgery    OTHER SURGICAL HISTORY  10/20/2021    Inguinal hernia repair    OTHER SURGICAL HISTORY  10/20/2021    Umbilical hernia repair   [2]   Past Medical History:  Diagnosis Date    Allergic     Anxiety     Arthritis     Asthma     Bipolar disorder     Class 2 obesity with body mass index (BMI) of 36.0 to 36.9 in adult 04/30/2025    Depression     Encounter for screening colonoscopy 05/12/2025    History of diverticulitis of colon     IBS (irritable bowel syndrome)     FAMILIA (obstructive sleep apnea)     PTSD (post-traumatic stress disorder)     Subacute maxillary sinusitis  04/28/2025   [3]   Current Outpatient Medications:     ammonium lactate (Lac-Hydrin) 12 % lotion, APPLY AND RUB IN A THIN LAYER TO AFFECTED AREA TWICE DAILY, Disp: , Rfl:     benzonatate (Tessalon) 100 mg capsule, Take 1 capsule (100 mg) by mouth 3 times a day as needed for cough. Do not crush or chew., Disp: 60 capsule, Rfl: 0    busPIRone (Buspar) 15 mg tablet, Take 1 tablet (15 mg) by mouth in the morning and 1 tablet (15 mg) in the evening and 1 tablet (15 mg) before bedtime., Disp: 90 tablet, Rfl: 2    econazole nitrate 1 % cream, Apply topically 2 times a day as needed for irritation or rash., Disp: 30 g, Rfl: 0    loratadine (Claritin) 10 mg tablet, Take 1 tablet (10 mg) by mouth once daily., Disp: 30 tablet, Rfl: 1    multivit with minerals/lutein (MULTIVITAMIN 50 PLUS ORAL), Take 1 tablet by mouth once daily., Disp: , Rfl:     nabumetone (Relafen) 750 mg tablet, Take 1 tablet (750 mg) by mouth 2 times a day., Disp: 60 tablet, Rfl: 11    QUEtiapine (SeroqueL) 25 mg tablet, Take 1 tablet (25 mg) by mouth once daily at bedtime., Disp: 30 tablet, Rfl: 2    venlafaxine XR (Effexor XR) 150 mg 24 hr capsule, Take 1 capsule (150 mg) by mouth once daily. Do not crush or chew., Disp: 30 capsule, Rfl: 5    venlafaxine XR (Effexor XR) 75 mg 24 hr capsule, Do not crush or chew.  Take 1 cap every am with 150 mg strength for 225 mg total daily dose, Disp: 30 capsule, Rfl: 2    fluticasone (Flonase Sensimist) 27.5 mcg/actuation nasal spray, Administer 1 spray into each nostril once daily., Disp: 10 g, Rfl: 2    pneumoc 20-cassia conj-dip cr,PF, (Prevnar 20, PF,) 0.5 mL vaccine, Inject 0.5 mL into the muscle., Disp: 0.5 mL, Rfl: 0    zoster vaccine-recombinant adjuvanted (Shingrix, PF,) 50 mcg/0.5 mL vaccine, Inject into the muscle., Disp: 0.5 mL, Rfl: 0  [4]   Allergies  Allergen Reactions    House Dust Unknown    Mold Unknown    Penicillins Angioedema

## 2025-05-12 NOTE — DISCHARGE INSTRUCTIONS
Patient Instructions after a Colonoscopy      The anesthetics, sedatives or narcotics which were given to you today will be acting in your body for the next 24 hours, so you might feel a little sleepy or groggy.  This feeling should slowly wear off. Carefully read and follow the instructions.     You received sedation today:  - Do not drive or operate any machinery or power tools of any kind.   - No alcoholic beverages today, not even beer or wine.  - Do not make any important decisions or sign any legal documents.  - No over the counter medications that contain alcohol or that may cause drowsiness.  - Do not make any important decisions or sign any legal documents.  - Make sure you have someone with you for first 24 hours.    While it is common to experience mild to moderate abdominal distention, gas, or belching after your procedure, if any of these symptoms occur following discharge from the GI Lab or within one week of having your procedure, call the [facility] to be advised whether a visit to your nearest Urgent Care or Emergency Department is indicated.  Take this paper with you if you go.     - If you develop an allergic reaction to the medications that were given during your procedure such as difficulty breathing, rash, hives, severe nausea, vomiting or lightheadedness.  - If you experience chest pain, shortness of breath, severe abdominal pain, fevers and chills.  -If you develop signs and symptoms of bleeding such as blood in your spit, if your stools turn black, tarry, or bloody  - If you have not urinated within 8 hours following your procedure.  - If your IV site becomes painful, red, inflamed, or looks infected.    If you received a biopsy/polypectomy/sphincterotomy the following instructions apply below:    __ Do not use Aspirin containing products, non-steroidal medications or anti-coagulants for one week following your procedure. (Examples of these types of medications are: Advil,  Arthrotec, Aleve, Coumadin, Ecotrin, Heparin, Ibuprofen, Indocin, Motrin, Naprosyn, Nuprin, Plavix, Vioxx, and Voltarin, or their generic forms.  This list is not all-inclusive.  Check with your physician or pharmacist before resuming medications.)   __ Eat a soft diet today.  Avoid foods that are poorly digested for the next 24 hours.  These foods would include: nuts, beans, lettuce, red meats, and fried foods. Start with liquids and advance your diet as tolerated, gradually work up to eating solids.   __ Do not have a Barium Study or Enema for one week.    Your physician recommends the additional following instructions:    -You have a contact number available for emergencies. The signs and symptoms of potential delayed complications were discussed with you. You may return to normal activities tomorrow.  -Resume your previous diet.  -Continue your present medications.   -We are waiting for your pathology results.  -Your physician has recommended a repeat colonoscopy (date to be determined after pending pathology results are reviewed) for surveillance based on pathology results.  -The findings and recommendations have been discussed with you.  -The findings and recommendations were discussed with your family.  - Please see Medication Reconciliation Form for new medication/medications prescribed.       If you experience any problems or have any questions following discharge from the GI Lab, please call:        Nurse Signature                                                                        Date___________________                                                                            Patient/Responsible Party Signature                                        Date___________________

## 2025-05-12 NOTE — ANESTHESIA POSTPROCEDURE EVALUATION
Patient: Cecil Ruiz    Procedure Summary       Date: 05/12/25 Room / Location: [facility] OR    Anesthesia Start: 1027 Anesthesia Stop: 1103    Procedure: COLONOSCOPY Diagnosis: Encounter for screening colonoscopy    Scheduled Providers: Mandeep Carrizales MD; Justyn Swann MD Responsible Provider: Justyn Swann MD    Anesthesia Type: MAC ASA Status: 2            Anesthesia Type: MAC    Vitals Value Taken Time   /73 05/12/25 11:15   Temp 36.5 °C (97.7 °F) 05/12/25 10:59   Pulse 70 05/12/25 11:15   Resp 15 05/12/25 11:15   SpO2 97 % 05/12/25 11:15       Anesthesia Post Evaluation    Patient location during evaluation: PACU  Patient participation: complete - patient participated  Level of consciousness: awake  Pain management: adequate  Multimodal analgesia pain management approach  Airway patency: patent  Two or more strategies used to mitigate risk of obstructive sleep apnea  Cardiovascular status: acceptable  Respiratory status: acceptable  Hydration status: acceptable  Postoperative Nausea and Vomiting: none        No notable events documented.

## 2025-05-22 NOTE — PROGRESS NOTES
Chief complaint:  right knee pain, L heel pain. Follow up    This is a pleasant 50-year-old right-handed man with past medical history of HTN, HLD, anxiety/depression, right tibial fracture, and chronic knee pain who presents for follow up of L heel pain, right knee pain.    He was last seen here on 3/25/2025, at which point we discussed how the previous left ultrasound-guided plantar fasciitis injection back in February helped significantly for a few weeks and he is here today for repeat injection.      In the meantime, I advised to continue Voltaren gel, night resting splint, not walking barefoot, rolling his foot over a golf ball or frozen bottle and I gave him some exercises.  These things do help, but not completely.  He was interested in speaking to a surgeon about removal of a heel spur.  I referred him to an orthopedic foot surgeon. But he did not make this appointment yet. He just got a new puppy and cannot have foot surgery.    Lately and his right pes anserinus bursitis pain has been acting up again.  He thinks due to combination of his foot and being pulled by his puppy in many directions.  He would like to be scheduled for this injection as well.    He rates his pain as a 3/10, previously 2/10 in his left heel    Otherwise, there have been no changes to medications or past medical history since the last visit.      __________________________  3/19/24: Right ultrasound-guided pes anserinus bursitis injection, try Voltaren gel, consider other medication injections, FMLA paperwork filled out again, proceed with physical therapy and daily home exercises  5/8/2024: Wean off Topamax as tolerated, continue PT and home exercises, FMLA paperwork filled out again   6/26/2024: Patellofemoral exercises provided, try Voltaren gel, consider other medications and injections, FMLA paperwork filled out  9/25/2024: Continue Voltaren gel as needed, continue exercises, work paperwork filled out, follow-up as  needed  1/28/2025: Plantar fasciitis recommendations, left foot x-ray, do core exercises, consider ultrasound-guided plantar fasciitis injection  2/25/2025: Left ultrasound-guided plantar fasciitis injection, continue same as above for plantar fasciitis, follow-up as needed  3/25/25: Continue with plantar fasciitis recommendations, start referral to orthopedic surgery, follow-up for repeat injection in 2 months, consider referral for Tenex  5/27/2025: Left ultrasound-guided plantar fasciitis injection, plantar fasciitis resting night splint provided, resume exercises, same as above  _________________________  As a reminder:    TIMELINE OF COMPLAINT(S):  Right knee arthritis and chronic knee pain after a traumatic tibial fracture in 2007 and surgical repair. Patient states knee started hurting near hardware 2 years post surgery. Meniscus torn September 2021, Dr. Saavedra performed laparoscopic meniscus repair. He has not been able to work since 2021.  He is s/p genicular RFA in September 2023 which did not help. He says he is ready for a knee replacement but 3-4 orthopedic surgeons say he is not ready due to age and radiographs. He has history of cocaine use, sober since 7/2023. He states he was started on topiramate 3 months ago and developed a rash on his face 1 month ago.  Patient also reports low back pain that is long standing but would like to focus on his knee pain at today's visit.    -He has some occasional urge bowel incontinence, denies any urine ncontinence or saddle anesthesia.    Pain:  LOCATION- medial right knee pointing to the pes anserinus area  RADIATION- no  ASSOCIATED WITH- Buckling  NUMBNESS/TINGLING- No  WEAKNESS- No  CONSTANT or INTERMITTENT- Constant  SEVERITY/QUANTITY- 3  QUALITY- Achy, burning along scars  EXACERBATED BY- Stairs, inclines, squats  BETTER WITH- Ice  TRIED- Ibuprofen, Topiramate      Anti-Inflammatories: ketorolac (didn't help), prednisone, ibuprofen, meloxicam, never tried  "Voltaren gel      Muscle relaxants: clonazepam (for panic), cyclobenzaprine, (doesn't remember).      Anti-depressants: multiple over years, no SNRI or TCA, currently on Abilify, Lexapro, and Buspar      Neuroleptics: Topiramate helped significantly, gabapentin (300mg TID),       LDN:    PHYSICAL THERAPY: Yes, 1-2yrs ago.  No home exercises  TENS unit: No  CHIROPRACTIC MANIPULATION: No  ACUPUNCTURE TREATMENTS: No  DEEP TISSUE MASSAGE THERAPY: No  OSTEOPATHIC MANIPULATION THERAPY: No  INJECTIONS: Previous cortisone and \"cortisone like injection\" did not help.  He does not think he had any hyaluronic acid injections, genicular nerve block 8/2023 (seemed to help), RFA 9/2023 (didn't help)  EMG/NCS: No    IMAGING: Yes    === 05/04/23 ===  XR KNEE COMPLETE 4 OR MORE VIEWS  Surgical plate and numerous surgical screws are again seen throughout  the proximal right tibia. Redemonstrated plateau fracture is  unchanged in appearance compared to prior imaging. No new displaced  fractures are evident. Normal bone mineralization. No sizable  suprapatellar joint effusion of the right knee.  - Impression -  Postsurgical changes of the proximal right tibia without significant  change compared to prior imaging.    XR R Knee 5/31/23  Postsurgical changes ORIF right proximal tibia with multiple intact   surgical screws.  There is mild osteoarthritis lateral compartment with   joint space narrowing and small marginal osteophytes.  Small   patellofemoral osteophytes are     Lumbar xray 7/28/22:  IMPRESSION:  1. Mild L5-S1 facet joint degenerative changes. Otherwise  unremarkable lumbar spine radiographs.    Lumbar MRI  9/14/2012:  IMPRESSION:       Minimal disc bulge at L4-L5 with slight central canal and  neural foramen narrowing.  Small midline herniation at L5-S1 causing minimal thecal sac indentation.     === 01/28/25 ===  XR FOOT 3+ VIEWS LEFT  - Impression -  Small left plantar calcaneal spur similar to prior study. No " "acute  findings.     FUNCTIONAL HISTORY: The patient is independent in all ADLs, mobility, and driving. The patient does use a cane for long distances only.    SH:  Lives in: [place]   Lives with: Daughter  Occupation: [employer]  Tobacco: Former, quit 2010. One pack daily  Alcohol: \"lots\"  Drugs: Former, quit cocaine in July 2023    ____________________________  ROS: The patient denies any bowel or bladder incontinence/accidents, night sweats, fevers, chills, recent significant weight loss. A 14 point review of systems was reviewed with the patient and is as above and otherwise negative.  ROS questionnaire positive for depression, anxiety, sleep disturbances    PHYSICAL EXAM    GEN - Alert, well-developed, well-nourished, no acute distress  PSYCH - Cooperative, appropriate mood and affect  HEENT - NC/AT, erythematous rash to bridge of nose, legs.  RESP - Non-labored respirations, equal expansion  CV - warm and well-perfused, No cyanosis or edema in extremities.   BD- soft, ND, overweight  SKIN -no rash    Significant tenderness over the mid point of the left heel, no abnormalities palpated, perhaps there is some swelling there.  No tenderness of the plantar fascia at its origin or midfoot.    Previous:    Right knee: 15cm surgical scar diagonally medial knee and 4cm vertical scar laterally, both tender to palpation. No effusion, or erythema, some warmth noted, multiple small scabs on knee, one bleeding.  No popliteal fullness.  No anterior, posterior, medial or lateral instability.  Significant tenderness over the right pes anserine reminiscent of his pain..  There is no significant pain with hyperflexion of the knee.  There is no pain with varus or valgus stressing of the knee during flexion and extension.  There is no crepitus.  There is medial joint line tenderness. Patellar grind negative.  Left knee: normal exam.    NEURO -   LE strength 5/5 -  including hip flexors, knee flexors, knee extensors, ankle dorsiflexors, " ankle plantar flexors, and EHL   Sensation - intact to light touch in bilateral lower extremities except for decreased to light touch right anterior knee both medial and lateral.  Reflexes - 3+ patellar and 2+ Achilles reflexes bilaterally.  1 or 2 nonsustained beats of clonus bilaterally, normal reflexes in the upper extremities, negative Estefania's, No Babinski.  GAIT - Normal base, normal stride length, antalgic.  Slow able to toe walk and heel walk without difficulty but with pain. Able to perform tandem gait without difficulty but painful.        PROCEDURE:     Lidocaine 1%- 1 cc's used, 0 cc's wasted  200mg/20mL (10mg/mL)  NDC 3312-3630-72  LOT [id number]  EXP 01/31/2026  Manuf: [facility]    Kenalog 40- 1 cc's used, 0 cc's wasted  NDC 5809-5351-26  LOT [id number]  EXP 04/2026  Manuf: [employer]       Procedure: left ultrasound-guided plantar fascia corticosteroid injections  Indication for procedure: L foot pain, plantar fasciitis        Procedure performed by: Yolanda Naidu M.D.     Informed consent obtained. Site confirmed by patient. Time out taken.  Ultrasound transmission gel applied and ultrasound images obtained of pre-injection site.     Please see saved images in the ultrasound tablet     Longitudinal and transverse views of left plantar fascia origins, calcaneus clearly visualized and marked.        Site prepped sterile with povidone-iodine. Ethyl chloride spray used to anesthetize the skin.  1 cc of 40 mg Kenalog, 1cc of 1% lidocaine injected into left medial plantar fascia under direct ultrasound guidance with 27-gauge 1.5 inch needle, location of medication confirmed by ultrasound in correct site.     Plan:    1. The patient was instructed in post-procedural care.   2. The patient was asked to apply moist heat and or ice for the next 24 hours and to perform daily gentle stretching exercises.      Physical Exam  Musculoskeletal:        Feet:      IMPRESSION:    This  is a pleasant 50-year-old  right-handed man with past medical history of HTN, anxiety/depression, right tibial fracture, and chronic knee pain who presents for follow-up of right knee pain.  Physical exam is reassuring for lack of neurologic compromise, there is some hyperreflexia in bilateral lower extremities.  Symptoms of physical exam findings consistent with pes anserinus bursitis rather than internal knee derangement.      Left heel spur,  Plantar fasciitis    -Left ultrasound-guided plantar fasciitis injection performed as above.  There were no complications and he tolerated the procedure well.  He was provided with postprocedure instructions.  -Continue Voltaren gel on the area of pain 4 times a day as needed  -Do not walk barefoot at all   -Continue to Roll your foot over a golf ball or a frozen bottle of water throughout the day  -Continue plantar fasciitis exercises and resting night brace, this is the most important. RESTING NIGHT SPLINT PROVIDED  -Referral to orthopedics provided previously, Dr. Luz, Dr. Rm, Carolina Yan  -Consider other medications in the future  -Consider knee gel injections, repeat pes anserine bursitis injection, trigger point injections for your back  -Consider referral to rheumatology in the future  -Continue daily home exercises, dynamic active strengthening focusing on your core exercises  -Consider lumbar MRI, knee MRI in the future  -Consider referral for Tenex procedure  -Follow-up next available for repeat ultrasound guided pes anserine bursitis injection. If you are feeling better, you can cancel        The patient expressed understanding and agreement with the assessment and plan. Patient encouraged to contact us should they have any questions, concerns, or any changes in symptoms.      Thank you for allowing me to participate in the care of your patient.       ** Dictated with voice recognition software, please forgive any errors in grammar and/or spelling **

## 2025-05-22 NOTE — PATIENT INSTRUCTIONS
-Ice on and off for the next 24 hours if injection sites are sore. Do gentle range of motion exercises in each area that was injected. Try to do them every hour for about half a minute or so, in every direction that the affected part goes. No pool, bath, or hot tub today. Avoid heavy lifting for the next 2 days.    -Continue Voltaren gel on the area of pain 4 times a day as needed  -Do not walk barefoot at all   -Continue to Roll your foot over a golf ball or a frozen bottle of water throughout the day  -Continue plantar fasciitis exercises and resting night brace, this is the most important. RESTING NIGHT SPLINT PROVIDED  -Referral to orthopedics provided previously, Dr. Luz, Dr. Rm, Carolina Yan  -Consider other medications in the future  -Consider knee gel injections, repeat pes anserine bursitis injection, trigger point injections for your back  -Consider referral to rheumatology in the future  -Continue daily home exercises, dynamic active strengthening focusing on your core exercises  -Consider lumbar MRI, knee MRI in the future  -Consider referral for Tenex procedure  -Follow-up next available for repeat ultrasound guided pes anserine bursitis injection. If you are feeling better, you can cancel

## 2025-05-27 ENCOUNTER — HOSPITAL ENCOUNTER (OUTPATIENT)
Dept: RADIOLOGY | Facility: EXTERNAL LOCATION | Age: 50
Discharge: HOME | End: 2025-05-27

## 2025-05-27 ENCOUNTER — PROCEDURE VISIT (OUTPATIENT)
Facility: CLINIC | Age: 50
End: 2025-05-27
Payer: MEDICARE

## 2025-05-27 ENCOUNTER — APPOINTMENT (OUTPATIENT)
Dept: PHYSICAL MEDICINE AND REHAB | Facility: CLINIC | Age: 50
End: 2025-05-27
Payer: MEDICARE

## 2025-05-27 VITALS
HEIGHT: 69 IN | BODY MASS INDEX: 36.73 KG/M2 | HEART RATE: 69 BPM | DIASTOLIC BLOOD PRESSURE: 87 MMHG | OXYGEN SATURATION: 96 % | SYSTOLIC BLOOD PRESSURE: 121 MMHG | TEMPERATURE: 97.5 F | WEIGHT: 248 LBS

## 2025-05-27 DIAGNOSIS — G89.29 CHRONIC PAIN OF RIGHT KNEE: ICD-10-CM

## 2025-05-27 DIAGNOSIS — M25.512 CHRONIC PAIN OF BOTH SHOULDERS: ICD-10-CM

## 2025-05-27 DIAGNOSIS — M25.561 CHRONIC PAIN OF RIGHT KNEE: ICD-10-CM

## 2025-05-27 DIAGNOSIS — M72.2 PLANTAR FASCIITIS OF LEFT FOOT: Primary | ICD-10-CM

## 2025-05-27 DIAGNOSIS — G89.29 CHRONIC PAIN OF BOTH SHOULDERS: ICD-10-CM

## 2025-05-27 DIAGNOSIS — G89.29 CHRONIC LOW BACK PAIN, UNSPECIFIED BACK PAIN LATERALITY, UNSPECIFIED WHETHER SCIATICA PRESENT: ICD-10-CM

## 2025-05-27 DIAGNOSIS — M70.51 PES ANSERINUS BURSITIS OF RIGHT KNEE: ICD-10-CM

## 2025-05-27 DIAGNOSIS — M54.2 NECK PAIN: ICD-10-CM

## 2025-05-27 DIAGNOSIS — M17.31 POST-TRAUMATIC OSTEOARTHRITIS OF RIGHT KNEE: ICD-10-CM

## 2025-05-27 DIAGNOSIS — M54.50 CHRONIC LOW BACK PAIN, UNSPECIFIED BACK PAIN LATERALITY, UNSPECIFIED WHETHER SCIATICA PRESENT: ICD-10-CM

## 2025-05-27 DIAGNOSIS — M25.511 CHRONIC PAIN OF BOTH SHOULDERS: ICD-10-CM

## 2025-05-27 DIAGNOSIS — M79.18 MYOFASCIAL PAIN SYNDROME: ICD-10-CM

## 2025-05-27 DIAGNOSIS — M72.2 PLANTAR FASCIITIS OF LEFT FOOT: ICD-10-CM

## 2025-05-27 RX ORDER — TRIAMCINOLONE ACETONIDE 40 MG/ML
40 INJECTION, SUSPENSION INTRA-ARTICULAR; INTRAMUSCULAR ONCE
Status: COMPLETED | OUTPATIENT
Start: 2025-05-27 | End: 2025-05-27

## 2025-05-27 RX ADMIN — TRIAMCINOLONE ACETONIDE 40 MG: 40 INJECTION, SUSPENSION INTRA-ARTICULAR; INTRAMUSCULAR at 14:57

## 2025-05-27 ASSESSMENT — PAIN SCALES - GENERAL: PAINLEVEL_OUTOF10: 3

## 2025-06-20 ENCOUNTER — OFFICE VISIT (OUTPATIENT)
Dept: PRIMARY CARE | Facility: CLINIC | Age: 50
End: 2025-06-20
Payer: MEDICARE

## 2025-06-20 VITALS
BODY MASS INDEX: 36.23 KG/M2 | HEIGHT: 69 IN | SYSTOLIC BLOOD PRESSURE: 128 MMHG | WEIGHT: 244.6 LBS | DIASTOLIC BLOOD PRESSURE: 84 MMHG

## 2025-06-20 DIAGNOSIS — R25.2 LEG CRAMP: ICD-10-CM

## 2025-06-20 DIAGNOSIS — E66.3 OVERWEIGHT: ICD-10-CM

## 2025-06-20 DIAGNOSIS — E66.9 OBESITY (BMI 30-39.9): Primary | ICD-10-CM

## 2025-06-20 DIAGNOSIS — F32.A ANXIETY AND DEPRESSION: ICD-10-CM

## 2025-06-20 DIAGNOSIS — F41.9 ANXIETY AND DEPRESSION: ICD-10-CM

## 2025-06-20 PROCEDURE — 99213 OFFICE O/P EST LOW 20 MIN: CPT | Performed by: INTERNAL MEDICINE

## 2025-06-20 PROCEDURE — 3008F BODY MASS INDEX DOCD: CPT | Performed by: INTERNAL MEDICINE

## 2025-06-20 RX ORDER — CALCIUM CARBONATE 300MG(750)
400 TABLET,CHEWABLE ORAL DAILY
Qty: 30 TABLET | Refills: 1 | Status: SHIPPED | OUTPATIENT
Start: 2025-06-20 | End: 2025-07-20

## 2025-06-20 NOTE — PROGRESS NOTES
"Subjective   Patient ID: Cecil Ruiz is a 50 y.o. male who presents for Follow-up (on various conditions.).    This gentleman, Mr. Ruiz today came here for multiple medical issues.  Overall, he is a happy person.  Appetite and weight are okay.  No chest pain.  Taking medications regularly.  1.  He has leg cramps.  He wants to try medication.  2.  He is very motivated to lose weight.  He did his own research.  He wants to go on Zepbound.  He brought literature.  He claims that he can get it for $99, which I find difficult to believe.  He came here for follow-up on various conditions.    I have personally reviewed the patient's Past Medical History, Medications, Allergies, Social History, and Family History in the EMR.    Review of Systems   All other systems reviewed and are negative.    Objective   /84   Ht 1.753 m (5' 9\")   Wt 111 kg (244 lb 9.6 oz)   BMI 36.12 kg/m²     Physical Exam  Vitals reviewed.   Cardiovascular:      Heart sounds: Normal heart sounds, S1 normal and S2 normal. No murmur heard.     No friction rub.   Pulmonary:      Effort: Pulmonary effort is normal.      Breath sounds: Normal breath sounds and air entry.   Abdominal:      Palpations: There is no hepatomegaly, splenomegaly or mass.   Musculoskeletal:      Right lower leg: No edema.      Left lower leg: No edema.   Lymphadenopathy:      Lower Body: No right inguinal adenopathy. No left inguinal adenopathy.   Neurological:      Cranial Nerves: Cranial nerves 2-12 are intact.      Sensory: No sensory deficit.      Motor: Motor function is intact.      Deep Tendon Reflexes: Reflexes are normal and symmetric.     LAB WORK:  Laboratory testing discussed.    Assessment/Plan   Problem List Items Addressed This Visit    None  Visit Diagnoses         Codes      Obesity (BMI 30-39.9)    -  Primary E66.9    Relevant Medications    tirzepatide, weight loss, (Zepbound) 2.5 mg/0.5 mL injection      Leg cramp     R25.2    Relevant Medications "    magnesium oxide (Mag-Ox) 400 mg tablet      Overweight     E66.3      Anxiety and depression     F41.9, F32.A        1. Overweight.  He wants to go on Zepbound, prescription given, once a week shot.  He understands.  Diet and exercise.  2. Leg cramps.  We will try some magnesium oxide.  3. Anxiety and depression, okay.  4. I shall see him back in four weeks.    Scribe Attestation  By signing my name below, I, Naseem Andrade attest that this documentation has been prepared under the direction and in the presence of Aldo Forrester MD.

## 2025-07-01 ENCOUNTER — APPOINTMENT (OUTPATIENT)
Dept: BEHAVIORAL HEALTH | Facility: CLINIC | Age: 50
End: 2025-07-01
Payer: MEDICARE

## 2025-07-01 DIAGNOSIS — F41.8 ANXIETY WITH DEPRESSION: ICD-10-CM

## 2025-07-01 DIAGNOSIS — F32.1 CURRENT MODERATE EPISODE OF MAJOR DEPRESSIVE DISORDER, UNSPECIFIED WHETHER RECURRENT (MULTI): ICD-10-CM

## 2025-07-01 PROCEDURE — 99214 OFFICE O/P EST MOD 30 MIN: CPT | Performed by: PSYCHIATRY & NEUROLOGY

## 2025-07-01 RX ORDER — BUPROPION HYDROCHLORIDE 150 MG/1
150 TABLET ORAL DAILY
Qty: 30 TABLET | Refills: 1 | Status: SHIPPED | OUTPATIENT
Start: 2025-07-01

## 2025-07-01 NOTE — PROGRESS NOTES
Subjective   Patient ID: Cecil Ruiz is a 50 y.o. male.    HPI    Patient Active Problem List    Diagnosis Date Noted    Cocaine abuse, uncomplicated (Multi) 04/09/2025    Bipolar affective disorder, currently depressed, moderate (Multi) 04/09/2025    Obesity, morbid (Multi) 04/09/2025    Plantar fasciitis of left foot 01/28/2025    Encounter for medical screening examination 05/14/2024    Radiculopathy of cervical spine 11/14/2023    Alcohol dependence in remission (Multi) 04/02/2023    Knee pain 04/02/2023    Post-traumatic osteoarthritis of right knee 04/02/2023    Back pain 03/27/2023    Allergy 03/27/2023    Anxiety disorder 03/27/2023    Anxiety with depression 03/27/2023    Bilateral shoulder pain 03/27/2023    Arthritis of right knee 03/27/2023    Asthma 03/27/2023    Back injury 03/27/2023    Benign fasciculation-cramp syndrome 03/27/2023    Cervical facet syndrome 03/27/2023    Change in bowel habit 03/27/2023    Cough 03/27/2023    Dizziness 03/27/2023    Excessive daytime sleepiness 03/27/2023    Hypocholesterolemia 03/27/2023    Irritable bowel syndrome without diarrhea 03/27/2023    Left inguinal hernia 03/27/2023    Myofascial pain syndrome 03/27/2023    Neck pain 03/27/2023    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    busPIRone (Buspar) 15 mg tablet Take 1 tablet (15 mg) by mouth in the morning and 1 tablet (15 mg) in the evening and 1 tablet (15 mg) before bedtime.    econazole nitrate 1 %  cream Topical, 2 times daily PRN    loratadine (CLARITIN) 10 mg, oral, Daily    magnesium oxide (MAG-OX) 400 mg, oral, Daily    multivit with minerals/lutein (MULTIVITAMIN 50 PLUS ORAL) 1 tablet, Daily    nabumetone (RELAFEN) 750 mg, oral, 2 times daily    pneumoc 20-cassia conj-dip cr,PF, (Prevnar 20, PF,) 0.5 mL vaccine Inject 0.5 mL into the muscle.    tirzepatide (weight loss) (ZEPBOUND) 2.5 mg, subcutaneous, Every 7 days    venlafaxine XR (Effexor XR) 75 mg 24 hr capsule Do not crush or chew.  Take 1 cap every am with 150 mg strength for 225 mg total daily dose    venlafaxine XR (EFFEXOR XR) 150 mg, oral, Daily, Do not crush or chew.    zoster vaccine-recombinant adjuvanted (Shingrix, PF,) 50 mcg/0.5 mL vaccine intramuscular     +depressive sx:  Low energy, low motivation;  Does not report psychotic, manic sx;  Denies substance use, denies SI/HI;     Review of Systems   Psychiatric/Behavioral:  Positive for dysphoric mood. Negative for suicidal ideas.        Objective   6/20/25  /84   Labs:   Latest Reference Range & Units 04/18/25 09:13   GLUCOSE 65 - 99 mg/dL 104 (H)   SODIUM 135 - 146 mmol/L 141   POTASSIUM 3.5 - 5.3 mmol/L 4.0   CHLORIDE 98 - 110 mmol/L 107   CARBON DIOXIDE 20 - 32 mmol/L 26   ELECTROLYTE BALANCE 7 - 17 mmol/L (calc) 8   UREA NITROGEN (BUN) 7 - 25 mg/dL 16   CREATININE 0.70 - 1.30 mg/dL 0.95   EGFR > OR = 60 mL/min/1.73m2 98   CALCIUM 8.6 - 10.3 mg/dL 9.6   ALBUMIN 3.6 - 5.1 g/dL 4.7   PROTEIN, TOTAL 6.1 - 8.1 g/dL 7.1   ALKALINE PHOSPHATASE 35 - 144 U/L 62   ALT 9 - 46 U/L 41   AST 10 - 35 U/L 26   BILIRUBIN, TOTAL 0.2 - 1.2 mg/dL 0.8   (H): Data is abnormally high  Physical Exam  Psychiatric:         Attention and Perception: Perception normal.         Behavior: Behavior is cooperative.         Thought Content: Thought content does not include homicidal or suicidal ideation.         Judgment: Judgment normal.       Assessment/Plan   Stop seroquel due to your reported s.e. (over  sedation)  Cont. Effexor xr, buspar as prescribed;  Trial of wellbutrin xl;  Diagnoses and all orders for this visit:  Anxiety with depression  -     Follow Up In Psychiatry  Current moderate episode of major depressive disorder, unspecified whether recurrent (Multi)  -     buPROPion XL (Wellbutrin XL) 150 mg 24 hr tablet; Take 1 tablet (150 mg) by mouth once daily. Do not crush, chew, or split.  -     Follow Up In Psychiatry; Future

## 2025-07-06 ASSESSMENT — ENCOUNTER SYMPTOMS: DYSPHORIC MOOD: 1

## 2025-07-15 ENCOUNTER — APPOINTMENT (OUTPATIENT)
Dept: PHYSICAL MEDICINE AND REHAB | Facility: CLINIC | Age: 50
End: 2025-07-15
Payer: MEDICARE

## 2025-07-21 ENCOUNTER — APPOINTMENT (OUTPATIENT)
Dept: PRIMARY CARE | Facility: CLINIC | Age: 50
End: 2025-07-21
Payer: MEDICARE

## 2025-07-25 ENCOUNTER — OFFICE VISIT (OUTPATIENT)
Dept: PRIMARY CARE | Facility: CLINIC | Age: 50
End: 2025-07-25
Payer: MEDICARE

## 2025-07-25 ENCOUNTER — PHARMACY VISIT (OUTPATIENT)
Dept: PHARMACY | Facility: CLINIC | Age: 50
End: 2025-07-25
Payer: MEDICARE

## 2025-07-25 VITALS
DIASTOLIC BLOOD PRESSURE: 72 MMHG | SYSTOLIC BLOOD PRESSURE: 124 MMHG | BODY MASS INDEX: 35.25 KG/M2 | HEIGHT: 69 IN | WEIGHT: 238 LBS

## 2025-07-25 DIAGNOSIS — H10.9 CONJUNCTIVITIS, UNSPECIFIED CONJUNCTIVITIS TYPE, UNSPECIFIED LATERALITY: Primary | ICD-10-CM

## 2025-07-25 DIAGNOSIS — T78.40XD ALLERGY, SUBSEQUENT ENCOUNTER: ICD-10-CM

## 2025-07-25 DIAGNOSIS — R25.2 LEG CRAMP: ICD-10-CM

## 2025-07-25 DIAGNOSIS — B37.9 CANDIDA INFECTION: ICD-10-CM

## 2025-07-25 DIAGNOSIS — J01.00 SUBACUTE MAXILLARY SINUSITIS: ICD-10-CM

## 2025-07-25 PROCEDURE — 99214 OFFICE O/P EST MOD 30 MIN: CPT | Performed by: INTERNAL MEDICINE

## 2025-07-25 PROCEDURE — RXMED WILLOW AMBULATORY MEDICATION CHARGE

## 2025-07-25 PROCEDURE — G2211 COMPLEX E/M VISIT ADD ON: HCPCS | Performed by: INTERNAL MEDICINE

## 2025-07-25 PROCEDURE — 3008F BODY MASS INDEX DOCD: CPT | Performed by: INTERNAL MEDICINE

## 2025-07-25 RX ORDER — NYSTATIN 100000 U/G
CREAM TOPICAL 2 TIMES DAILY
Qty: 30 G | Refills: 0 | Status: SHIPPED | OUTPATIENT
Start: 2025-07-25 | End: 2025-07-30 | Stop reason: ENTERED-IN-ERROR

## 2025-07-25 RX ORDER — ERYTHROMYCIN 5 MG/G
OINTMENT OPHTHALMIC 4 TIMES DAILY
Qty: 3.5 G | Refills: 0 | Status: SHIPPED | OUTPATIENT
Start: 2025-07-25 | End: 2025-08-01

## 2025-07-25 RX ORDER — ROPINIROLE 0.5 MG/1
0.5 TABLET, FILM COATED ORAL 3 TIMES DAILY
Qty: 90 TABLET | Refills: 0 | Status: SHIPPED | OUTPATIENT
Start: 2025-07-25 | End: 2026-07-25

## 2025-07-25 RX ORDER — ZOSTER VACCINE RECOMBINANT, ADJUVANTED 50 MCG/0.5
0.5 KIT INTRAMUSCULAR
Qty: 0.5 ML | Refills: 0 | OUTPATIENT
Start: 2025-07-25

## 2025-07-25 RX ORDER — LORATADINE 10 MG/1
10 TABLET ORAL DAILY
Qty: 30 TABLET | Refills: 1 | Status: SHIPPED | OUTPATIENT
Start: 2025-07-25 | End: 2025-10-23

## 2025-07-25 RX ORDER — CALCIUM CARBONATE 300MG(750)
400 TABLET,CHEWABLE ORAL DAILY
Qty: 30 TABLET | Refills: 0 | Status: SHIPPED | OUTPATIENT
Start: 2025-07-25 | End: 2025-08-24

## 2025-07-26 NOTE — PROGRESS NOTES
"Subjective   Patient ID: Cecil Ruiz is a 50 y.o. male who presents for multiple medical issues.    This gentleman Joseph today came here for multiple medical issues.  1. He is concerned.  Restless leg syndrome, magnesium only is not helping.  He is concerned with that.  Appetite and weight are okay.  2. Oral foreign body sensation.  Allergies bothering him.  Eye itching ______. He is concerned.    I have personally reviewed the patient's Past Medical History, Medications, Allergies, Social History, and Family History in the EMR.    Review of Systems   All other systems reviewed and are negative.    Objective   /72   Ht 1.753 m (5' 9\")   Wt 108 kg (238 lb)   BMI 35.15 kg/m²     Physical Exam  Vitals reviewed.   HENT:      Mouth/Throat:      Comments: Throat congested, looks like candida.    Cardiovascular:      Heart sounds: Normal heart sounds, S1 normal and S2 normal. No murmur heard.     No friction rub.   Pulmonary:      Effort: Pulmonary effort is normal.      Breath sounds: Normal breath sounds and air entry.   Abdominal:      Palpations: There is no hepatomegaly, splenomegaly or mass.     Musculoskeletal:      Right lower leg: No edema.      Left lower leg: No edema.   Lymphadenopathy:      Lower Body: No right inguinal adenopathy. No left inguinal adenopathy.     Neurological:      Cranial Nerves: Cranial nerves 2-12 are intact.      Sensory: No sensory deficit.      Motor: Motor function is intact.      Deep Tendon Reflexes: Reflexes are normal and symmetric.     LAB WORK: Laboratory testing discussed.    Assessment/Plan   Problem List Items Addressed This Visit           ICD-10-CM    Allergy T78.40XA     Other Visit Diagnoses         Codes      Conjunctivitis, unspecified conjunctivitis type, unspecified laterality    -  Primary H10.9      Leg cramp     R25.2    Relevant Medications    magnesium oxide (Mag-Ox) 400 mg tablet    rOPINIRole (Requip) 0.5 mg tablet      Subacute maxillary sinusitis  "    J01.00    Relevant Medications    nystatin (Mycostatin) cream    erythromycin (Romycin) 5 mg/gram (0.5 %) ophthalmic ointment    loratadine (Claritin) 10 mg tablet      Candida infection     B37.9        1. Candida.  Nystatin swish and swallow.  2. Restless leg syndrome.  Magnesium stays.  I added Requip to it.  3. Conjunctivitis, blepharitis.  Erythromycin ointment.  4. Allergies.  Claritin, Flonase given.  5. Follow-up in two weeks if he is not better.    Abril Attestation  By signing my name below, IKeiry Scribe attest that this documentation has been prepared under the direction and in the presence of Aldo Forrester MD.     All medical record entries made by the abril were personally dictated by me I have reviewed the chart and agree the record accurately reflects my personal performance of his history physical examination and management

## 2025-07-29 ENCOUNTER — APPOINTMENT (OUTPATIENT)
Dept: BEHAVIORAL HEALTH | Facility: CLINIC | Age: 50
End: 2025-07-29
Payer: MEDICARE

## 2025-07-30 DIAGNOSIS — B37.9 CANDIDA INFECTION: ICD-10-CM

## 2025-07-30 RX ORDER — NYSTATIN 100000 [USP'U]/ML
500000 SUSPENSION ORAL 4 TIMES DAILY
Qty: 280 ML | Refills: 0 | Status: SHIPPED | OUTPATIENT
Start: 2025-07-30 | End: 2025-09-28

## 2025-08-04 ENCOUNTER — APPOINTMENT (OUTPATIENT)
Dept: BEHAVIORAL HEALTH | Facility: CLINIC | Age: 50
End: 2025-08-04
Payer: MEDICARE

## 2025-08-04 DIAGNOSIS — F41.9 ANXIETY DISORDER, UNSPECIFIED TYPE: ICD-10-CM

## 2025-08-04 DIAGNOSIS — F41.8 ANXIETY WITH DEPRESSION: ICD-10-CM

## 2025-08-04 PROCEDURE — 99214 OFFICE O/P EST MOD 30 MIN: CPT | Performed by: PSYCHIATRY & NEUROLOGY

## 2025-08-04 RX ORDER — VENLAFAXINE HYDROCHLORIDE 150 MG/1
150 CAPSULE, EXTENDED RELEASE ORAL DAILY
Qty: 30 CAPSULE | Refills: 5 | Status: SHIPPED | OUTPATIENT
Start: 2025-08-04

## 2025-08-04 RX ORDER — CLONAZEPAM 0.5 MG/1
0.5 TABLET ORAL DAILY PRN
Qty: 15 TABLET | Refills: 0 | Status: SHIPPED | OUTPATIENT
Start: 2025-08-04

## 2025-08-04 RX ORDER — VENLAFAXINE HYDROCHLORIDE 75 MG/1
CAPSULE, EXTENDED RELEASE ORAL
Qty: 30 CAPSULE | Refills: 5 | Status: SHIPPED | OUTPATIENT
Start: 2025-08-04

## 2025-08-04 NOTE — PROGRESS NOTES
Subjective   Patient ID: Cecil Ruiz is a 50 y.o. male.    HPI  Patient Active Problem List    Diagnosis Date Noted    Cocaine abuse, uncomplicated (Multi) 04/09/2025    Bipolar affective disorder, currently depressed, moderate (Multi) 04/09/2025    Obesity, morbid (Multi) 04/09/2025    Plantar fasciitis of left foot 01/28/2025    Encounter for medical screening examination 05/14/2024    Radiculopathy of cervical spine 11/14/2023    Alcohol dependence in remission (Multi) 04/02/2023    Knee pain 04/02/2023    Post-traumatic osteoarthritis of right knee 04/02/2023    Back pain 03/27/2023    Allergy 03/27/2023    Anxiety disorder 03/27/2023    Anxiety with depression 03/27/2023    Bilateral shoulder pain 03/27/2023    Arthritis of right knee 03/27/2023    Asthma 03/27/2023    Back injury 03/27/2023    Benign fasciculation-cramp syndrome 03/27/2023    Cervical facet syndrome 03/27/2023    Change in bowel habit 03/27/2023    Cough 03/27/2023    Dizziness 03/27/2023    Excessive daytime sleepiness 03/27/2023    Hypocholesterolemia 03/27/2023    Irritable bowel syndrome without diarrhea 03/27/2023    Left inguinal hernia 03/27/2023    Myofascial pain syndrome 03/27/2023    Neck pain 03/27/2023    Neuromyotonia 03/27/2023    FAMILIA (obstructive sleep apnea) 03/27/2023    Polycythemia vera 03/27/2023    Recurrent umbilical hernia with incarceration 03/27/2023    Allergic rhinitis due to mold 03/27/2023    Arthritis 03/27/2023    Bursitis 03/27/2023    Rhinitis 03/27/2023    Right knee pain 03/27/2023     Diagnoses of Anxiety disorder, unspecified type and Anxiety with depression were pertinent to this visit.    Recurrence of panic attack--[facility]--where recurred;  Busy place;  Felt overwhelmed;  Stopped Wellbutrin--- didn't feel like it was working;    Review of Systems   Psychiatric/Behavioral:  Negative for self-injury and suicidal ideas. The patient is nervous/anxious.        Objective   BP: 124/72  as of  7/25/2025 124/72   Heart Rate: 69  as of 5/27/2025      Physical Exam    Psychiatric:         Attention and Perception: Perception normal.         Behavior: Behavior is cooperative.         Thought Content: Thought content is not paranoid or delusional. Thought content does not include homicidal or suicidal ideation. Thought content does not include homicidal or suicidal plan.       Mood/affect: appropriate  I/J: intact  Assessment/Plan   Medications:   Buspar 15 mg in the am, 30 mg at bedtime  Effexor xr 225 mg total daily dose  .  OARRS reviewed;  .  Trial of clonazepam- use only if/when needed for panic attacks/extreme anxiety;  .  CSA reviewed, signed;  .  Detailed review of r/b/se/alt and morbidity and mortality risks and dependence potential with use of benzidiazepines;  Pt gave verbal understanding and agreement;  Diagnoses and all orders for this visit:  Anxiety disorder, unspecified type  -     clonazePAM (KlonoPIN) 0.5 mg tablet; Take 1 tablet (0.5 mg) by mouth once daily as needed for anxiety.  Anxiety with depression  -     venlafaxine XR (Effexor XR) 150 mg 24 hr capsule; Take 1 capsule (150 mg) by mouth once daily. Do not crush or chew.  -     venlafaxine XR (Effexor XR) 75 mg 24 hr capsule; Do not crush or chew.  Take 1 cap every am with 150 mg strength for 225 mg total daily dose  -     Follow Up In Psychiatry; Future  -     busPIRone (Buspar) 15 mg tablet; Take 1 tablet (15 mg) by mouth in the morning and 1 tablet (15 mg) in the evening and 1 tablet (15 mg) before bedtime.

## 2025-08-10 RX ORDER — BUSPIRONE HYDROCHLORIDE 15 MG/1
TABLET ORAL
Qty: 90 TABLET | Refills: 2 | Status: SHIPPED | OUTPATIENT
Start: 2025-08-10

## 2025-08-10 ASSESSMENT — ENCOUNTER SYMPTOMS: NERVOUS/ANXIOUS: 1

## 2025-10-28 ENCOUNTER — APPOINTMENT (OUTPATIENT)
Dept: BEHAVIORAL HEALTH | Facility: CLINIC | Age: 50
End: 2025-10-28
Payer: MEDICARE